# Patient Record
Sex: MALE | Race: WHITE | NOT HISPANIC OR LATINO | URBAN - METROPOLITAN AREA
[De-identification: names, ages, dates, MRNs, and addresses within clinical notes are randomized per-mention and may not be internally consistent; named-entity substitution may affect disease eponyms.]

---

## 2017-01-29 ENCOUNTER — INPATIENT (INPATIENT)
Facility: HOSPITAL | Age: 62
LOS: 0 days | Discharge: HOME | DRG: 313 | End: 2017-01-30
Attending: INTERNAL MEDICINE | Admitting: INTERNAL MEDICINE
Payer: MEDICAID

## 2017-01-29 VITALS
OXYGEN SATURATION: 97 % | RESPIRATION RATE: 18 BRPM | HEIGHT: 65 IN | TEMPERATURE: 98 F | WEIGHT: 220.02 LBS | SYSTOLIC BLOOD PRESSURE: 167 MMHG | HEART RATE: 63 BPM | DIASTOLIC BLOOD PRESSURE: 100 MMHG

## 2017-01-29 DIAGNOSIS — R07.9 CHEST PAIN, UNSPECIFIED: ICD-10-CM

## 2017-01-29 DIAGNOSIS — K21.9 GASTRO-ESOPHAGEAL REFLUX DISEASE WITHOUT ESOPHAGITIS: ICD-10-CM

## 2017-01-29 DIAGNOSIS — J44.9 CHRONIC OBSTRUCTIVE PULMONARY DISEASE, UNSPECIFIED: ICD-10-CM

## 2017-01-29 DIAGNOSIS — I10 ESSENTIAL (PRIMARY) HYPERTENSION: ICD-10-CM

## 2017-01-29 DIAGNOSIS — E78.5 HYPERLIPIDEMIA, UNSPECIFIED: ICD-10-CM

## 2017-01-29 DIAGNOSIS — Z98.89 OTHER SPECIFIED POSTPROCEDURAL STATES: Chronic | ICD-10-CM

## 2017-01-29 LAB
ALBUMIN SERPL ELPH-MCNC: 3.8 G/DL — SIGNIFICANT CHANGE UP (ref 3.5–5)
ALP SERPL-CCNC: 80 U/L — SIGNIFICANT CHANGE UP (ref 40–120)
ALT FLD-CCNC: 26 U/L DA — SIGNIFICANT CHANGE UP (ref 10–60)
ANION GAP SERPL CALC-SCNC: 7 MMOL/L — SIGNIFICANT CHANGE UP (ref 5–17)
AST SERPL-CCNC: 20 U/L — SIGNIFICANT CHANGE UP (ref 10–40)
BASOPHILS # BLD AUTO: 0.1 K/UL — SIGNIFICANT CHANGE UP (ref 0–0.2)
BASOPHILS NFR BLD AUTO: 1.1 % — SIGNIFICANT CHANGE UP (ref 0–2)
BILIRUB SERPL-MCNC: 0.5 MG/DL — SIGNIFICANT CHANGE UP (ref 0.2–1.2)
BUN SERPL-MCNC: 15 MG/DL — SIGNIFICANT CHANGE UP (ref 7–18)
CALCIUM SERPL-MCNC: 9.9 MG/DL — SIGNIFICANT CHANGE UP (ref 8.4–10.5)
CHLORIDE SERPL-SCNC: 108 MMOL/L — SIGNIFICANT CHANGE UP (ref 96–108)
CK MB BLD-MCNC: 2 % — SIGNIFICANT CHANGE UP (ref 0–3.5)
CK MB CFR SERPL CALC: 1 NG/ML — SIGNIFICANT CHANGE UP (ref 0–3.6)
CK SERPL-CCNC: 51 U/L — SIGNIFICANT CHANGE UP (ref 35–232)
CO2 SERPL-SCNC: 31 MMOL/L — SIGNIFICANT CHANGE UP (ref 22–31)
CREAT SERPL-MCNC: 1.15 MG/DL — SIGNIFICANT CHANGE UP (ref 0.5–1.3)
EOSINOPHIL # BLD AUTO: 0.3 K/UL — SIGNIFICANT CHANGE UP (ref 0–0.5)
EOSINOPHIL NFR BLD AUTO: 3.8 % — SIGNIFICANT CHANGE UP (ref 0–6)
GLUCOSE SERPL-MCNC: 87 MG/DL — SIGNIFICANT CHANGE UP (ref 70–99)
HCT VFR BLD CALC: 51.5 % — HIGH (ref 39–50)
HGB BLD-MCNC: 17.3 G/DL — HIGH (ref 13–17)
LIDOCAIN IGE QN: 122 U/L — SIGNIFICANT CHANGE UP (ref 73–393)
LYMPHOCYTES # BLD AUTO: 1.8 K/UL — SIGNIFICANT CHANGE UP (ref 1–3.3)
LYMPHOCYTES # BLD AUTO: 20.1 % — SIGNIFICANT CHANGE UP (ref 13–44)
MCHC RBC-ENTMCNC: 31 PG — SIGNIFICANT CHANGE UP (ref 27–34)
MCHC RBC-ENTMCNC: 33.6 GM/DL — SIGNIFICANT CHANGE UP (ref 32–36)
MCV RBC AUTO: 92.4 FL — SIGNIFICANT CHANGE UP (ref 80–100)
MONOCYTES # BLD AUTO: 0.7 K/UL — SIGNIFICANT CHANGE UP (ref 0–0.9)
MONOCYTES NFR BLD AUTO: 7.6 % — SIGNIFICANT CHANGE UP (ref 2–14)
NEUTROPHILS # BLD AUTO: 6.2 K/UL — SIGNIFICANT CHANGE UP (ref 1.8–7.4)
NEUTROPHILS NFR BLD AUTO: 67.4 % — SIGNIFICANT CHANGE UP (ref 43–77)
PLATELET # BLD AUTO: 169 K/UL — SIGNIFICANT CHANGE UP (ref 150–400)
POTASSIUM SERPL-MCNC: 4 MMOL/L — SIGNIFICANT CHANGE UP (ref 3.5–5.3)
POTASSIUM SERPL-SCNC: 4 MMOL/L — SIGNIFICANT CHANGE UP (ref 3.5–5.3)
PROT SERPL-MCNC: 7 G/DL — SIGNIFICANT CHANGE UP (ref 6–8.3)
RBC # BLD: 5.57 M/UL — SIGNIFICANT CHANGE UP (ref 4.2–5.8)
RBC # FLD: 12.8 % — SIGNIFICANT CHANGE UP (ref 10.3–14.5)
SODIUM SERPL-SCNC: 146 MMOL/L — HIGH (ref 135–145)
TROPONIN I SERPL-MCNC: <0.015 NG/ML — SIGNIFICANT CHANGE UP (ref 0–0.04)
TROPONIN I SERPL-MCNC: <0.015 NG/ML — SIGNIFICANT CHANGE UP (ref 0–0.04)
WBC # BLD: 9.1 K/UL — SIGNIFICANT CHANGE UP (ref 3.8–10.5)
WBC # FLD AUTO: 9.1 K/UL — SIGNIFICANT CHANGE UP (ref 3.8–10.5)

## 2017-01-29 PROCEDURE — 71010: CPT | Mod: 26

## 2017-01-29 PROCEDURE — 99285 EMERGENCY DEPT VISIT HI MDM: CPT | Mod: 25

## 2017-01-29 RX ORDER — PANTOPRAZOLE SODIUM 20 MG/1
40 TABLET, DELAYED RELEASE ORAL
Qty: 0 | Refills: 0 | Status: DISCONTINUED | OUTPATIENT
Start: 2017-01-29 | End: 2017-01-30

## 2017-01-29 RX ORDER — ATORVASTATIN CALCIUM 80 MG/1
1 TABLET, FILM COATED ORAL
Qty: 0 | Refills: 0 | COMMUNITY

## 2017-01-29 RX ORDER — FLUTICASONE PROPIONATE AND SALMETEROL 50; 250 UG/1; UG/1
0 POWDER ORAL; RESPIRATORY (INHALATION)
Qty: 0 | Refills: 0 | COMMUNITY

## 2017-01-29 RX ORDER — ACETAMINOPHEN 500 MG
650 TABLET ORAL EVERY 6 HOURS
Qty: 0 | Refills: 0 | Status: DISCONTINUED | OUTPATIENT
Start: 2017-01-29 | End: 2017-01-30

## 2017-01-29 RX ORDER — NITROGLYCERIN 6.5 MG
0.3 CAPSULE, EXTENDED RELEASE ORAL
Qty: 0 | Refills: 0 | Status: DISCONTINUED | OUTPATIENT
Start: 2017-01-29 | End: 2017-01-30

## 2017-01-29 RX ORDER — ATORVASTATIN CALCIUM 80 MG/1
40 TABLET, FILM COATED ORAL AT BEDTIME
Qty: 0 | Refills: 0 | Status: DISCONTINUED | OUTPATIENT
Start: 2017-01-29 | End: 2017-01-30

## 2017-01-29 RX ORDER — IBUPROFEN 200 MG
400 TABLET ORAL ONCE
Qty: 0 | Refills: 0 | Status: DISCONTINUED | OUTPATIENT
Start: 2017-01-29 | End: 2017-01-29

## 2017-01-29 RX ORDER — CARVEDILOL PHOSPHATE 80 MG/1
0 CAPSULE, EXTENDED RELEASE ORAL
Qty: 0 | Refills: 0 | COMMUNITY

## 2017-01-29 RX ORDER — SERTRALINE 25 MG/1
100 TABLET, FILM COATED ORAL DAILY
Qty: 0 | Refills: 0 | Status: DISCONTINUED | OUTPATIENT
Start: 2017-01-29 | End: 2017-01-30

## 2017-01-29 RX ORDER — CLOPIDOGREL BISULFATE 75 MG/1
75 TABLET, FILM COATED ORAL DAILY
Qty: 0 | Refills: 0 | Status: DISCONTINUED | OUTPATIENT
Start: 2017-01-29 | End: 2017-01-30

## 2017-01-29 RX ORDER — BUDESONIDE AND FORMOTEROL FUMARATE DIHYDRATE 160; 4.5 UG/1; UG/1
2 AEROSOL RESPIRATORY (INHALATION)
Qty: 0 | Refills: 0 | Status: DISCONTINUED | OUTPATIENT
Start: 2017-01-29 | End: 2017-01-30

## 2017-01-29 RX ORDER — LOSARTAN POTASSIUM 100 MG/1
100 TABLET, FILM COATED ORAL DAILY
Qty: 0 | Refills: 0 | Status: DISCONTINUED | OUTPATIENT
Start: 2017-01-29 | End: 2017-01-30

## 2017-01-29 RX ORDER — FUROSEMIDE 40 MG
1 TABLET ORAL
Qty: 0 | Refills: 0 | COMMUNITY

## 2017-01-29 RX ORDER — CARVEDILOL PHOSPHATE 80 MG/1
6.25 CAPSULE, EXTENDED RELEASE ORAL EVERY 12 HOURS
Qty: 0 | Refills: 0 | Status: DISCONTINUED | OUTPATIENT
Start: 2017-01-29 | End: 2017-01-30

## 2017-01-29 RX ORDER — VALACYCLOVIR 500 MG/1
1 TABLET, FILM COATED ORAL
Qty: 0 | Refills: 0 | COMMUNITY

## 2017-01-29 RX ORDER — ENOXAPARIN SODIUM 100 MG/ML
40 INJECTION SUBCUTANEOUS DAILY
Qty: 0 | Refills: 0 | Status: DISCONTINUED | OUTPATIENT
Start: 2017-01-29 | End: 2017-01-30

## 2017-01-29 RX ORDER — MORPHINE SULFATE 50 MG/1
2 CAPSULE, EXTENDED RELEASE ORAL ONCE
Qty: 0 | Refills: 0 | Status: DISCONTINUED | OUTPATIENT
Start: 2017-01-29 | End: 2017-01-29

## 2017-01-29 RX ORDER — ROPINIROLE 8 MG/1
1 TABLET, FILM COATED, EXTENDED RELEASE ORAL THREE TIMES A DAY
Qty: 0 | Refills: 0 | Status: DISCONTINUED | OUTPATIENT
Start: 2017-01-29 | End: 2017-01-30

## 2017-01-29 RX ADMIN — ROPINIROLE 1 MILLIGRAM(S): 8 TABLET, FILM COATED, EXTENDED RELEASE ORAL at 13:18

## 2017-01-29 RX ADMIN — CLOPIDOGREL BISULFATE 75 MILLIGRAM(S): 75 TABLET, FILM COATED ORAL at 13:18

## 2017-01-29 RX ADMIN — ATORVASTATIN CALCIUM 40 MILLIGRAM(S): 80 TABLET, FILM COATED ORAL at 22:35

## 2017-01-29 RX ADMIN — LOSARTAN POTASSIUM 100 MILLIGRAM(S): 100 TABLET, FILM COATED ORAL at 13:18

## 2017-01-29 RX ADMIN — ROPINIROLE 1 MILLIGRAM(S): 8 TABLET, FILM COATED, EXTENDED RELEASE ORAL at 22:35

## 2017-01-29 RX ADMIN — BUDESONIDE AND FORMOTEROL FUMARATE DIHYDRATE 2 PUFF(S): 160; 4.5 AEROSOL RESPIRATORY (INHALATION) at 22:37

## 2017-01-29 RX ADMIN — Medication 650 MILLIGRAM(S): at 21:14

## 2017-01-29 RX ADMIN — Medication 650 MILLIGRAM(S): at 22:34

## 2017-01-29 RX ADMIN — CARVEDILOL PHOSPHATE 6.25 MILLIGRAM(S): 80 CAPSULE, EXTENDED RELEASE ORAL at 18:10

## 2017-01-29 NOTE — H&P ADULT. - NEGATIVE CARDIOVASCULAR SYMPTOMS
no peripheral edema/no dyspnea on exertion/no paroxysmal nocturnal dyspnea/no orthopnea/no claudication

## 2017-01-29 NOTE — H&P ADULT. - NEGATIVE GASTROINTESTINAL SYMPTOMS
no constipation/no change in bowel habits/no jaundice/no abdominal pain/no vomiting/no hematochezia/no diarrhea/no steatorrhea/no nausea/no hiccoughs

## 2017-01-29 NOTE — H&P ADULT. - RS GEN PE MLT RESP DETAILS PC
no rhonchi/good air movement/breath sounds equal/no wheezes/respirations non-labored/clear to auscultation bilaterally/airway patent/no chest wall tenderness/no rales/normal/no subcutaneous emphysema/no intercostal retractions

## 2017-01-29 NOTE — H&P ADULT. - NEGATIVE GENERAL SYMPTOMS
no polyuria/no fever/no polydipsia/no sweating/no malaise/no polyphagia/no weight gain/no fatigue/no chills/no anorexia/no weight loss

## 2017-01-29 NOTE — H&P ADULT. - PSH
H/O prostatectomy  2011  Penile abnormality  insertion of penile prothesis 11/2013  S/P colonoscopy  and endoscopy 6/2013  S/P prostatectomy  2/2011  S/P UPPP (Uvulopalatopharyngoplasty)  2008  S/P uvulopalatopharyngoplasty  2009

## 2017-01-29 NOTE — H&P ADULT. - FAMILY HISTORY
Father  Still living? Unknown  Family history of hypertension, Age at diagnosis: Age Unknown     Sibling  Still living? Unknown  Family history of hypertension, Age at diagnosis: Age Unknown  History of hypertension, Age at diagnosis: Age Unknown

## 2017-01-29 NOTE — ED PROVIDER NOTE - PMH
Asthma  controlled- no RX @ present  BPH (Benign Prostatic Hyperplasia)    CAD (Coronary Atherosclerotic Disease)  myocardial infarction- 1988, s/p PCI-RCA stent- 8/6/07  Cardiac abnormality  cardiac stent X1 2007  COPD (chronic obstructive pulmonary disease)    Dyslipidemia    Gastroesophageal reflux disease    GERD (Gastroesophageal Reflux Disease)    GI bleed  6/2013 pt stopped ASA  History of prostate cancer    HTN (Hypertension)    Hyperlipidemia    Hypertension    IBS (irritable bowel syndrome)    Impotence sexual    Insomnia    Male urinary stress incontinence    MI (myocardial infarction)  1988 s/p insert stent  Obstructive Sleep Apnea  s/p UPP-2008 sleep studies -2008 refuses to use cpap.  Prostate cancer  denies chemotherapy and radiation  Prostate cancer    Sleep apnea    Stress incontinence, male    Urinary incontinence

## 2017-01-29 NOTE — H&P ADULT. - ASSESSMENT
60 y/o M with Hx asthma/ COPD ( never intubated , not home O2 ), BPH , CAD , HLD , GERD , HTN , tremor ( on ropinirole ) , prostate cancer ( s/p resection ) , Hx GI bleed ,  s/p NEO surgery  who presents to ED for chest pain started 1-30 am last night. As per patient he was in his usual state of health until yesterday night 1-30 am when he woke up with left sided chest pain radiating from left head , associated with nausea , dizziness , SOB , burning in quality , 7/10 , was constant for 2-3 hours and then resolved . Nothing was making it better or worse. Hx MI in 1988 and s/p stent in 2007 , on Plavix since then. Currently in ED pt is not in pain , vitals stable , EKG - NSR , trop1 - negative

## 2017-01-29 NOTE — ED PROVIDER NOTE - MEDICAL DECISION MAKING DETAILS
Pt is a 61M with chest pain. labs, EKG, CXR done. pt given ASA 325mg in ED. pt admitted for chest pain r/o ACS. PMD: Dr. LU Avelar

## 2017-01-29 NOTE — H&P ADULT. - NEGATIVE NEUROLOGICAL SYMPTOMS
no loss of sensation/no facial palsy/no syncope/no vertigo/no paresthesias/no loss of consciousness/no transient paralysis/no tremors/no headache/no difficulty walking/no generalized seizures/no weakness/no hemiparesis

## 2017-01-29 NOTE — H&P ADULT. - PROBLEM SELECTOR PLAN 3
Goal BP < 150/90 mmhg   c/w home BP meds losartan   holding chlorthalidone as out pharmacy doesn't have   pt to bring own home meds   c/w DASH diet

## 2017-01-29 NOTE — H&P ADULT. - NEUROLOGICAL DETAILS
no spontaneous movement/responds to verbal commands/alert and oriented x 3/deep reflexes intact/responds to pain/cranial nerves intact/sensation intact

## 2017-01-29 NOTE — H&P ADULT. - NEGATIVE OPHTHALMOLOGIC SYMPTOMS
no pain L/no blurred vision L/no loss of vision R/no loss of vision L/no lacrimation L/no discharge R/no lacrimation R/no irritation L/no irritation R/no scleral injection R/no pain R/no scleral injection L/no photophobia/no diplopia

## 2017-01-29 NOTE — H&P ADULT. - PROBLEM SELECTOR PLAN 1
concern for ACS   Hx MI , CAD with stent , HTN , HLD   unremarkable cardio and neuro exam   c/w tele monitoring   trop - 1 negative , trend cardiac enzymes   c/w ACS protocol - Plavix , BB, statin   f/u HbA1c , TSH , Lipid profile , f/u echo concern for ACS   Hx MI , CAD with stent , HTN , HLD   unremarkable cardio and neuro exam   c/w tele monitoring   trop - 1 negative , trend cardiac enzymes   c/w ACS protocol - Plavix , BB, statin   f/u HbA1c , TSH , Lipid profile , f/u echo  Dr. Underwood cardio

## 2017-01-29 NOTE — H&P ADULT. - NEGATIVE GENERAL GENITOURINARY SYMPTOMS
no renal colic/no urinary hesitancy/no incontinence/normal urinary frequency/no flank pain R/no urine discoloration/no bladder infections/no gas in urine/no flank pain L/no hematuria/no dysuria/no nocturia/normal libido

## 2017-01-29 NOTE — H&P ADULT. - NEGATIVE SKIN SYMPTOMS
no itching/no pitted nails/no brittle nails/no change in size/color of mole/no tumor/no hair loss/no rash/no dryness

## 2017-01-29 NOTE — H&P ADULT. - NEGATIVE ENMT SYMPTOMS
no post-nasal discharge/no sinus symptoms/no abnormal taste sensation/no tinnitus/no dry mouth/no nasal obstruction/no hearing difficulty/no nasal congestion/no ear pain/no dysphagia/no nasal discharge/no vertigo/no nose bleeds/no throat pain/no gum bleeding/no recurrent cold sores

## 2017-01-29 NOTE — H&P ADULT. - HISTORY OF PRESENT ILLNESS
62 y/o M with Hx asthma, BPH , CAD , HLD , GERD , HTN , tremor ( on ropinirole ) , prostate cancer ( s/p resection ) , Hx GI bleed who presents to ED for chest pain started 1-30 am last night. As per patient he was in his usual state of health until yesterday night 1-30 am when he woke up with left sided chest pain radiating from left head , associated with nausea , dizziness , SOB , burning in quality , 7/10 , was constant for 2-3 hours and then resolved . Nothing was making it better or worse. Hx MI in 1988 and s/p stent in 2007 , on Plavix since then. Currently in ED pt is not in pain , vitals stable , EKG - NSR , trop1 - negative

## 2017-01-29 NOTE — ED PROVIDER NOTE - OBJECTIVE STATEMENT
Pt is a 61M who presents to ED for chest pain described as burning dull pain beginning last night around 8 pm. Pt states the pain is intermittent. Pt presents due to pain being persistent. Pt has minimal chest pain in ED.

## 2017-01-29 NOTE — H&P ADULT. - GASTROINTESTINAL DETAILS
no bruit/soft/no distention/bowel sounds normal/no guarding/no masses palpable/no rebound tenderness/no organomegaly/no rigidity/nontender/normal

## 2017-01-29 NOTE — H&P ADULT. - NEGATIVE MUSCULOSKELETAL SYMPTOMS
no arm pain R/no back pain/no joint swelling/no arthritis/no muscle weakness/no leg pain R/no arthralgia/no stiffness/no muscle cramps/no myalgia/no leg pain L

## 2017-01-30 ENCOUNTER — TRANSCRIPTION ENCOUNTER (OUTPATIENT)
Age: 62
End: 2017-01-30

## 2017-01-30 VITALS
OXYGEN SATURATION: 94 % | DIASTOLIC BLOOD PRESSURE: 95 MMHG | RESPIRATION RATE: 18 BRPM | HEART RATE: 63 BPM | SYSTOLIC BLOOD PRESSURE: 141 MMHG | TEMPERATURE: 97 F

## 2017-01-30 LAB
CK MB BLD-MCNC: <1.3 % — SIGNIFICANT CHANGE UP (ref 0–3.5)
CK MB CFR SERPL CALC: <1 NG/ML — SIGNIFICANT CHANGE UP (ref 0–3.6)
CK SERPL-CCNC: 76 U/L — SIGNIFICANT CHANGE UP (ref 35–232)
TROPONIN I SERPL-MCNC: <0.015 NG/ML — SIGNIFICANT CHANGE UP (ref 0–0.04)

## 2017-01-30 RX ADMIN — CLOPIDOGREL BISULFATE 75 MILLIGRAM(S): 75 TABLET, FILM COATED ORAL at 11:08

## 2017-01-30 RX ADMIN — BUDESONIDE AND FORMOTEROL FUMARATE DIHYDRATE 2 PUFF(S): 160; 4.5 AEROSOL RESPIRATORY (INHALATION) at 11:08

## 2017-01-30 RX ADMIN — ROPINIROLE 1 MILLIGRAM(S): 8 TABLET, FILM COATED, EXTENDED RELEASE ORAL at 05:33

## 2017-01-30 RX ADMIN — PANTOPRAZOLE SODIUM 40 MILLIGRAM(S): 20 TABLET, DELAYED RELEASE ORAL at 05:33

## 2017-01-30 RX ADMIN — LOSARTAN POTASSIUM 100 MILLIGRAM(S): 100 TABLET, FILM COATED ORAL at 05:32

## 2017-01-30 RX ADMIN — CARVEDILOL PHOSPHATE 6.25 MILLIGRAM(S): 80 CAPSULE, EXTENDED RELEASE ORAL at 05:32

## 2017-01-30 RX ADMIN — ROPINIROLE 1 MILLIGRAM(S): 8 TABLET, FILM COATED, EXTENDED RELEASE ORAL at 13:57

## 2017-01-30 NOTE — DISCHARGE NOTE ADULT - HOSPITAL COURSE
62 y/o M with Hx asthma, BPH , CAD , HLD , GERD , HTN , tremor ( on ropinirole ) , prostate cancer ( s/p resection ) , Hx GI bleed who presented  to ED for chest pain .Hx MI in 1988 and s/p stent in 2007 , on plavix since then. , EKG - NSR , trop1  x 3 negative. patient admitted to telemetry to rule out  ACS  and serial cardiac enzymes negative and cardiology folllowed the patient and recommended stress test [facility] is negative for iscahemia  HTN- C/W LOSARTAN AND CLORTHIADONE   f/u PMD AS OUT-PATIENT   GERD- c/w protonix   hyperlipidemia- c/w with atorvostatin   please follow  up with PMD within 1 week of discharge and patient now stable for discharge

## 2017-01-30 NOTE — DISCHARGE NOTE ADULT - PLAN OF CARE
resolution of symptoms please take your medications as prescribed and please follow up with PMD within 1 week of discharge goal B.P<140/90

## 2017-01-30 NOTE — DISCHARGE NOTE ADULT - MEDICATION SUMMARY - MEDICATIONS TO TAKE
I will START or STAY ON the medications listed below when I get home from the hospital:    naproxen 500 mg oral tablet  --  by mouth   -- Indication: For pain    losartan 100 mg oral tablet  -- 1 tab(s) by mouth once a day  -- Indication: For Hypertension    sertraline 100 mg oral tablet  -- 1 tab(s) by mouth once a day  -- Indication: For depression    Lipitor 40 mg oral tablet  -- 1 tab(s) by mouth once a day (at bedtime)  -- Indication: For Hyperlipidemia    rOPINIRole 1 mg oral tablet  -- 1 tab(s) by mouth 3 times a day  -- Indication: For parkinsons    clopidogrel 75 mg oral tablet  -- 1 tab(s) by mouth once a day  -- Indication: For Cad    zolpidem 10 mg oral tablet  -- 1 tab(s) by mouth once a day (at bedtime)  -- Indication: For ANXIOLYTIC    Coreg 6.25 mg oral tablet  -- 1 tab(s) by mouth 2 times a day  -- Indication: For Hypertension    Advair Diskus 250 mcg-50 mcg inhalation powder  -- 1 puff(s) inhaled 2 times a day  -- Indication: For COPD (chronic obstructive pulmonary disease)    triamcinolone 0.1% topical cream  -- Apply on skin to affected area   -- Indication: For TOIPCAL-AGENT    chlorthalidone 25 mg oral tablet  -- 1 tab(s) by mouth once a day  -- Indication: For Hypertension    Amitiza 24 mcg oral capsule  --  by mouth   -- Indication: For FUCTIONAL BOWEL DISORDER    NexIUM 40 mg oral delayed release capsule  -- 1 cap(s) by mouth once a day  -- Indication: For Gi prophylaxsis I will START or STAY ON the medications listed below when I get home from the hospital:    naproxen 500 mg oral tablet  --  by mouth   -- Indication: For Chest pain    losartan 100 mg oral tablet  -- 1 tab(s) by mouth once a day  -- Indication: For HTN (hypertension)    sertraline 100 mg oral tablet  -- 1 tab(s) by mouth once a day  -- Indication: For depression    Lipitor 40 mg oral tablet  -- 1 tab(s) by mouth once a day (at bedtime)  -- Indication: For Hyperlipidemia    rOPINIRole 1 mg oral tablet  -- 1 tab(s) by mouth 3 times a day  -- Indication: For tremor    clopidogrel 75 mg oral tablet  -- 1 tab(s) by mouth once a day  -- Indication: For Chest pain with low risk of acute coronary syndrome    zolpidem 10 mg oral tablet  -- 1 tab(s) by mouth once a day (at bedtime)  -- Indication: For Anxiolytic    Coreg 6.25 mg oral tablet  -- 1 tab(s) by mouth 2 times a day  -- Indication: For HTN (hypertension)    Advair Diskus 250 mcg-50 mcg inhalation powder  -- 1 puff(s) inhaled 2 times a day  -- Indication: For Asthma    triamcinolone 0.1% topical cream  -- Apply on skin to affected area   -- Indication: For topical agent    chlorthalidone 25 mg oral tablet  -- 1 tab(s) by mouth once a day  -- Indication: For Hypertension    Amitiza 24 mcg oral capsule  --  by mouth   -- Indication: For Gastrointestinal agent    NexIUM 40 mg oral delayed release capsule  -- 1 cap(s) by mouth once a day  -- Indication: For Gi prophylaxsis

## 2017-01-30 NOTE — DISCHARGE NOTE ADULT - CARE PLAN
Principal Discharge DX:	Chest pain with low risk of acute coronary syndrome  Goal:	resolution of symptoms  Instructions for follow-up, activity and diet:	please take your medications as prescribed and please follow up with PMD within 1 week of discharge  Secondary Diagnosis:	Asthma  Goal:	resolution of symptoms  Instructions for follow-up, activity and diet:	please take your medications as prescribed and please follow up with PMD within 1 week of discharge  Secondary Diagnosis:	Gastroesophageal reflux disease  Goal:	resolution of symptoms  Instructions for follow-up, activity and diet:	please take your medications as prescribed and please follow up with PMD within 1 week of discharge  Secondary Diagnosis:	HTN (hypertension)  Goal:	goal B.P<140/90  Instructions for follow-up, activity and diet:	please take your medications as prescribed and please follow up with PMD within 1 week of discharge  Secondary Diagnosis:	Hyperlipidemia

## 2017-01-30 NOTE — DISCHARGE NOTE ADULT - PATIENT PORTAL LINK FT
“You can access the [facility] Patient Portal, offered by [facility], by registering with the following website: [email or web address]”

## 2017-02-02 DIAGNOSIS — I25.2 OLD MYOCARDIAL INFARCTION: ICD-10-CM

## 2017-02-02 DIAGNOSIS — R07.9 CHEST PAIN, UNSPECIFIED: ICD-10-CM

## 2017-02-02 DIAGNOSIS — R32 UNSPECIFIED URINARY INCONTINENCE: ICD-10-CM

## 2017-02-02 DIAGNOSIS — I25.10 ATHEROSCLEROTIC HEART DISEASE OF NATIVE CORONARY ARTERY WITHOUT ANGINA PECTORIS: ICD-10-CM

## 2017-02-02 DIAGNOSIS — N40.0 BENIGN PROSTATIC HYPERPLASIA WITHOUT LOWER URINARY TRACT SYMPTOMS: ICD-10-CM

## 2017-02-02 DIAGNOSIS — E78.5 HYPERLIPIDEMIA, UNSPECIFIED: ICD-10-CM

## 2017-02-02 DIAGNOSIS — G47.33 OBSTRUCTIVE SLEEP APNEA (ADULT) (PEDIATRIC): ICD-10-CM

## 2017-02-02 DIAGNOSIS — Z85.46 PERSONAL HISTORY OF MALIGNANT NEOPLASM OF PROSTATE: ICD-10-CM

## 2017-02-02 DIAGNOSIS — I10 ESSENTIAL (PRIMARY) HYPERTENSION: ICD-10-CM

## 2017-02-02 DIAGNOSIS — N39.3 STRESS INCONTINENCE (FEMALE) (MALE): ICD-10-CM

## 2017-02-02 DIAGNOSIS — K21.9 GASTRO-ESOPHAGEAL REFLUX DISEASE WITHOUT ESOPHAGITIS: ICD-10-CM

## 2017-02-02 DIAGNOSIS — R25.1 TREMOR, UNSPECIFIED: ICD-10-CM

## 2017-02-02 DIAGNOSIS — J44.9 CHRONIC OBSTRUCTIVE PULMONARY DISEASE, UNSPECIFIED: ICD-10-CM

## 2017-12-18 PROCEDURE — 78452 HT MUSCLE IMAGE SPECT MULT: CPT

## 2017-12-18 PROCEDURE — 82553 CREATINE MB FRACTION: CPT

## 2017-12-18 PROCEDURE — 71045 X-RAY EXAM CHEST 1 VIEW: CPT

## 2017-12-18 PROCEDURE — 80053 COMPREHEN METABOLIC PANEL: CPT

## 2017-12-18 PROCEDURE — 84484 ASSAY OF TROPONIN QUANT: CPT

## 2017-12-18 PROCEDURE — 93017 CV STRESS TEST TRACING ONLY: CPT

## 2017-12-18 PROCEDURE — 99285 EMERGENCY DEPT VISIT HI MDM: CPT | Mod: 25

## 2017-12-18 PROCEDURE — A9502: CPT

## 2017-12-18 PROCEDURE — 93306 TTE W/DOPPLER COMPLETE: CPT

## 2017-12-18 PROCEDURE — 93005 ELECTROCARDIOGRAM TRACING: CPT

## 2017-12-18 PROCEDURE — 83690 ASSAY OF LIPASE: CPT

## 2017-12-18 PROCEDURE — 85027 COMPLETE CBC AUTOMATED: CPT

## 2017-12-18 PROCEDURE — 94640 AIRWAY INHALATION TREATMENT: CPT

## 2017-12-18 PROCEDURE — 82550 ASSAY OF CK (CPK): CPT

## 2018-06-15 ENCOUNTER — OUTPATIENT (OUTPATIENT)
Dept: OUTPATIENT SERVICES | Facility: HOSPITAL | Age: 63
LOS: 1 days | End: 2018-06-15
Payer: MEDICARE

## 2018-06-15 VITALS
WEIGHT: 227.96 LBS | HEIGHT: 65 IN | HEART RATE: 82 BPM | TEMPERATURE: 98 F | RESPIRATION RATE: 18 BRPM | DIASTOLIC BLOOD PRESSURE: 95 MMHG | SYSTOLIC BLOOD PRESSURE: 151 MMHG | OXYGEN SATURATION: 97 %

## 2018-06-15 DIAGNOSIS — G47.33 OBSTRUCTIVE SLEEP APNEA (ADULT) (PEDIATRIC): ICD-10-CM

## 2018-06-15 DIAGNOSIS — Z98.89 OTHER SPECIFIED POSTPROCEDURAL STATES: Chronic | ICD-10-CM

## 2018-06-15 DIAGNOSIS — T83.490A OTHER MECHANICAL COMPLICATION OF IMPLANTED PENILE PROSTHESIS, INITIAL ENCOUNTER: ICD-10-CM

## 2018-06-15 DIAGNOSIS — I10 ESSENTIAL (PRIMARY) HYPERTENSION: ICD-10-CM

## 2018-06-15 DIAGNOSIS — Z01.818 ENCOUNTER FOR OTHER PREPROCEDURAL EXAMINATION: ICD-10-CM

## 2018-06-15 DIAGNOSIS — Z95.5 PRESENCE OF CORONARY ANGIOPLASTY IMPLANT AND GRAFT: ICD-10-CM

## 2018-06-15 PROCEDURE — G0463: CPT

## 2018-06-15 PROCEDURE — 71046 X-RAY EXAM CHEST 2 VIEWS: CPT

## 2018-06-15 PROCEDURE — 71046 X-RAY EXAM CHEST 2 VIEWS: CPT | Mod: 26

## 2018-06-15 RX ORDER — SERTRALINE 25 MG/1
1 TABLET, FILM COATED ORAL
Qty: 0 | Refills: 0 | COMMUNITY

## 2018-06-15 RX ORDER — SODIUM CHLORIDE 9 MG/ML
3 INJECTION INTRAMUSCULAR; INTRAVENOUS; SUBCUTANEOUS EVERY 8 HOURS
Qty: 0 | Refills: 0 | Status: DISCONTINUED | OUTPATIENT
Start: 2018-06-25 | End: 2018-07-03

## 2018-06-15 RX ORDER — ZOLPIDEM TARTRATE 10 MG/1
1 TABLET ORAL
Qty: 0 | Refills: 0 | COMMUNITY

## 2018-06-15 RX ORDER — LUBIPROSTONE 24 UG/1
0 CAPSULE, GELATIN COATED ORAL
Qty: 0 | Refills: 0 | COMMUNITY

## 2018-06-15 NOTE — H&P PST ADULT - ACTIVITY
Sedentary, walks 1-2 blocks, avoids stairs Sedentary, but able to walks 1-2 blocks w/o SOB, avoids stairs

## 2018-06-15 NOTE — H&P PST ADULT - CARDIOVASCULAR COMMENTS
h/o old MI x2, CAD s/p PTCA x 1, HTN, HLD, Obesity h/o old MI x2, s/p PCI-RCA stent- 8/6/07 x 1, HTN, HLD, Obesity

## 2018-06-15 NOTE — H&P PST ADULT - NEGATIVE CARDIOVASCULAR SYMPTOMS
no dyspnea on exertion/no peripheral edema/no palpitations/no orthopnea/no paroxysmal nocturnal dyspnea/no claudication/no chest pain no chest pain/no peripheral edema/no palpitations/no orthopnea/no paroxysmal nocturnal dyspnea/no claudication

## 2018-06-15 NOTE — H&P PST ADULT - NEGATIVE PSYCHIATRIC SYMPTOMS
no suicidal ideation/no memory loss/no anxiety/no depression/no insomnia no anxiety/no suicidal ideation/no depression/no insomnia

## 2018-06-15 NOTE — H&P PST ADULT - PROBLEM SELECTOR PLAN 4
Instructed to stop plavix and start aspirin 81mg; take aspirin the morning of surgery prior to coming to hospital. Hx coronary stent x 1. Verbalized understanding of instruction

## 2018-06-15 NOTE — H&P PST ADULT - NEGATIVE GASTROINTESTINAL SYMPTOMS
no diarrhea/no change in bowel habits/no vomiting/no constipation/no nausea/no flatulence/no abdominal pain

## 2018-06-15 NOTE — H&P PST ADULT - PROBLEM SELECTOR PLAN 3
Agrees to take antihypertensive medications as prescribed the morning of surgery with a sip of water prior to coming to hospital

## 2018-06-15 NOTE — H&P PST ADULT - NS MD HP INPLANTS MED DEV
Penile implant/coronary stent x 1, incontinence filter Penile implant/coronary stent x 1, incontinence device

## 2018-06-15 NOTE — H&P PST ADULT - GASTROINTESTINAL DETAILS
no rigidity/no distention/no bruit/no rebound tenderness/no guarding/no organomegaly/soft/nontender/no masses palpable/bowel sounds normal

## 2018-06-15 NOTE — H&P PST ADULT - PROBLEM SELECTOR PLAN 1
Scheduled for removal and replacement of all components of multicomponent inflatable penile prosthesis on 6/25/2018. Preoperative instructions discussed and given to patient. Verbalized understanding of same

## 2018-06-15 NOTE — H&P PST ADULT - GENITOURINARY COMMENTS
h/o urinary filter implant for incontinence, h/o prostate cancer s/p prostatectomy penile implant in place, malfunctioning h/o urinary device implanted for incontinence, h/o prostate cancer s/p prostatectomy penile implant in place, malfunctioning x 6 months

## 2018-06-15 NOTE — H&P PST ADULT - ASSESSMENT
63 y/o male with PMH of NEO on CPAP, ASHD s/p PCI 2007, HTN, HLD, old MI x 2, COPD, GERD, Prostate Cancer s/p prostatectomy, Implant of device for urinary incontinence, ED s/p penile prosthesis now scheduled for removal and replacement of all components of multicomponent inflatable penile prosthesis on 6/25/2018. Patient is at high risk for planned procedure

## 2018-06-15 NOTE — H&P PST ADULT - RS GEN PE MLT RESP DETAILS PC
no chest wall tenderness/no rhonchi/no subcutaneous emphysema/no rales/no wheezes/normal/airway patent/respirations non-labored/breath sounds equal/good air movement/clear to auscultation bilaterally/no intercostal retractions no chest wall tenderness/no rhonchi/no subcutaneous emphysema/airway patent/no wheezes/good air movement/no intercostal retractions/respirations non-labored/breath sounds equal/clear to auscultation bilaterally/no rales

## 2018-06-15 NOTE — H&P PST ADULT - NEGATIVE RESPIRATORY AND THORAX SYMPTOMS
no pleuritic chest pain/no wheezing/no dyspnea/no cough/no hemoptysis no pleuritic chest pain/no wheezing/no hemoptysis

## 2018-06-15 NOTE — H&P PST ADULT - HISTORY OF PRESENT ILLNESS
61 y/o male with multiple comorbid conditions, on medication presents to [facility] for presurgical evaluation. Complains of malfunctioning penile implant. He is scheduled for removal and replacement of all components of multicomponent inflatable penile prosthesis on 6/25/2018 63 y/o male with multiple comorbid conditions, stable on medication presents to [facility] for presurgical evaluation. Complains of malfunctioning penile implant x 6 months. He is scheduled for removal and replacement of all components of multicomponent inflatable penile prosthesis on 6/25/2018

## 2018-06-24 ENCOUNTER — TRANSCRIPTION ENCOUNTER (OUTPATIENT)
Age: 63
End: 2018-06-24

## 2018-06-25 ENCOUNTER — OUTPATIENT (OUTPATIENT)
Dept: OUTPATIENT SERVICES | Facility: HOSPITAL | Age: 63
LOS: 1 days | End: 2018-06-25
Payer: MEDICARE

## 2018-06-25 ENCOUNTER — RESULT REVIEW (OUTPATIENT)
Age: 63
End: 2018-06-25

## 2018-06-25 VITALS
TEMPERATURE: 98 F | OXYGEN SATURATION: 100 % | SYSTOLIC BLOOD PRESSURE: 141 MMHG | HEART RATE: 73 BPM | RESPIRATION RATE: 14 BRPM | DIASTOLIC BLOOD PRESSURE: 83 MMHG

## 2018-06-25 VITALS — WEIGHT: 227.96 LBS | HEIGHT: 65 IN

## 2018-06-25 DIAGNOSIS — Z01.818 ENCOUNTER FOR OTHER PREPROCEDURAL EXAMINATION: ICD-10-CM

## 2018-06-25 DIAGNOSIS — Z98.89 OTHER SPECIFIED POSTPROCEDURAL STATES: Chronic | ICD-10-CM

## 2018-06-25 DIAGNOSIS — T83.490A OTHER MECHANICAL COMPLICATION OF IMPLANTED PENILE PROSTHESIS, INITIAL ENCOUNTER: ICD-10-CM

## 2018-06-25 PROCEDURE — 88300 SURGICAL PATH GROSS: CPT | Mod: 26,59

## 2018-06-25 PROCEDURE — C1813: CPT

## 2018-06-25 PROCEDURE — 54410 REMOVE/REPLACE PENIS PROSTH: CPT | Mod: AS

## 2018-06-25 PROCEDURE — 54410 REMOVE/REPLACE PENIS PROSTH: CPT

## 2018-06-25 RX ORDER — GENTAMICIN SULFATE 40 MG/ML
VIAL (ML) INJECTION
Qty: 0 | Refills: 0 | Status: DISCONTINUED | OUTPATIENT
Start: 2018-06-25 | End: 2018-06-25

## 2018-06-25 RX ORDER — SODIUM CHLORIDE 9 MG/ML
1000 INJECTION, SOLUTION INTRAVENOUS
Qty: 0 | Refills: 0 | Status: DISCONTINUED | OUTPATIENT
Start: 2018-06-25 | End: 2018-07-03

## 2018-06-25 RX ORDER — MOXIFLOXACIN HYDROCHLORIDE TABLETS, 400 MG 400 MG/1
1 TABLET, FILM COATED ORAL
Qty: 10 | Refills: 0
Start: 2018-06-25 | End: 2018-06-29

## 2018-06-25 RX ORDER — GENTAMICIN SULFATE 40 MG/ML
80 VIAL (ML) INJECTION ONCE
Qty: 0 | Refills: 0 | Status: DISCONTINUED | OUTPATIENT
Start: 2018-06-25 | End: 2018-06-25

## 2018-06-25 RX ORDER — HYDROMORPHONE HYDROCHLORIDE 2 MG/ML
0.5 INJECTION INTRAMUSCULAR; INTRAVENOUS; SUBCUTANEOUS
Qty: 0 | Refills: 0 | Status: DISCONTINUED | OUTPATIENT
Start: 2018-06-25 | End: 2018-06-25

## 2018-06-25 RX ORDER — OXYCODONE AND ACETAMINOPHEN 5; 325 MG/1; MG/1
1 TABLET ORAL EVERY 4 HOURS
Qty: 0 | Refills: 0 | Status: DISCONTINUED | OUTPATIENT
Start: 2018-06-25 | End: 2018-06-25

## 2018-06-25 RX ORDER — ONDANSETRON 8 MG/1
4 TABLET, FILM COATED ORAL ONCE
Qty: 0 | Refills: 0 | Status: DISCONTINUED | OUTPATIENT
Start: 2018-06-25 | End: 2018-06-25

## 2018-06-25 RX ORDER — IBUPROFEN 200 MG
400 TABLET ORAL ONCE
Qty: 0 | Refills: 0 | Status: COMPLETED | OUTPATIENT
Start: 2018-06-25 | End: 2018-06-25

## 2018-06-25 RX ADMIN — Medication 400 MILLIGRAM(S): at 13:20

## 2018-06-25 RX ADMIN — OXYCODONE AND ACETAMINOPHEN 1 TABLET(S): 5; 325 TABLET ORAL at 11:25

## 2018-06-25 RX ADMIN — HYDROMORPHONE HYDROCHLORIDE 0.5 MILLIGRAM(S): 2 INJECTION INTRAMUSCULAR; INTRAVENOUS; SUBCUTANEOUS at 10:27

## 2018-06-25 RX ADMIN — Medication 400 MILLIGRAM(S): at 12:50

## 2018-06-25 RX ADMIN — HYDROMORPHONE HYDROCHLORIDE 0.5 MILLIGRAM(S): 2 INJECTION INTRAMUSCULAR; INTRAVENOUS; SUBCUTANEOUS at 10:57

## 2018-06-25 RX ADMIN — OXYCODONE AND ACETAMINOPHEN 1 TABLET(S): 5; 325 TABLET ORAL at 12:45

## 2018-06-25 NOTE — ASU DISCHARGE PLAN (ADULT/PEDIATRIC). - NOTIFY
Bleeding that does not stop Fever greater than 101/Bleeding that does not stop/Unable to Urinate/Pain not relieved by Medications

## 2018-06-25 NOTE — ASU DISCHARGE PLAN (ADULT/PEDIATRIC). - MEDICATION SUMMARY - MEDICATIONS TO TAKE
I will START or STAY ON the medications listed below when I get home from the hospital:    naproxen 500 mg oral tablet  --  by mouth   -- Indication: For as previously perscribed    Percocet 5/325 oral tablet  -- 1 tab(s) by mouth every 6 hours MDD:4  -- Caution federal law prohibits the transfer of this drug to any person other  than the person for whom it was prescribed.  May cause drowsiness.  Alcohol may intensify this effect.  Use care when operating dangerous machinery.  This prescription cannot be refilled.  This product contains acetaminophen.  Do not use  with any other product containing acetaminophen to prevent possible liver damage.  Using more of this medication than prescribed may cause serious breathing problems.    -- Indication: For pain    aspirin 81 mg oral tablet  -- 1 tab(s) by mouth once a day  -- Indication: For as previously perscribed    losartan 100 mg oral tablet  -- 1 tab(s) by mouth once a day  -- Indication: For as previously perscribed    ezetimibe 10 mg oral tablet  -- 1 tab(s) by mouth once a day  -- Indication: For as previously perscribed    Crestor 10 mg oral tablet  -- 1 tab(s) by mouth once a day (at bedtime)  -- Indication: For as previously perscribed    rOPINIRole 1 mg oral tablet  -- 1 tab(s) by mouth 3 times a day  -- Indication: For as previously perscribed    clopidogrel 75 mg oral tablet  -- 1 tab(s) by mouth once a day  -- Indication: For as previously perscribed    Coreg 6.25 mg oral tablet  -- 1 tab(s) by mouth 2 times a day  -- Indication: For as previously perscribed    Advair Diskus 250 mcg-50 mcg inhalation powder  -- 1 puff(s) inhaled 2 times a day  -- Indication: For as previously perscribed    Anoro Ellipta 62.5 mcg-25 mcg/inh inhalation powder  -- 1 puff(s) inhaled once a day  -- Indication: For as previously perscribed    donepezil 5 mg oral tablet  -- 1 tab(s) by mouth once a day (at bedtime)  -- Indication: For as previously perscribed    chlorthalidone 25 mg oral tablet  -- 1 tab(s) by mouth once a day  -- Indication: For as previously perscribed    Trulance 3 mg oral tablet  -- 1 tab(s) by mouth once a day  -- Indication: For as previously perscribed    Dexilant 60 mg oral delayed release capsule  -- 1 cap(s) by mouth once a day  -- Indication: For as previously perscribed    Cipro 500 mg oral tablet  -- 1 tab(s) by mouth every 12 hours MDD:2  -- Avoid prolonged or excessive exposure to direct and/or artificial sunlight while taking this medication.  Check with your doctor before becoming pregnant.  Do not take dairy products, antacids, or iron preparations within one hour of this medication.  Finish all this medication unless otherwise directed by prescriber.  Medication should be taken with plenty of water.    -- Indication: For abx    levothyroxine 50 mcg (0.05 mg) oral capsule  -- 1 cap(s) by mouth once a day  -- Indication: For as previously perscribed    Myrbetriq 25 mg oral tablet, extended release  -- 1 tab(s) by mouth once a day  -- Indication: For as previously perscribed    hydrALAZINE 25 mg oral tablet  -- 1 tab(s) by mouth once a day  -- Indication: For as previously perscribed    ergocalciferol 50,000 intl units (1.25 mg) oral capsule  -- 1 cap(s) by mouth once a week  -- Indication: For as previously perscribed

## 2019-01-13 ENCOUNTER — EMERGENCY (EMERGENCY)
Facility: HOSPITAL | Age: 64
LOS: 1 days | Discharge: HOME | End: 2019-01-13
Attending: EMERGENCY MEDICINE
Payer: MEDICARE

## 2019-01-13 VITALS
OXYGEN SATURATION: 97 % | SYSTOLIC BLOOD PRESSURE: 152 MMHG | HEIGHT: 65 IN | DIASTOLIC BLOOD PRESSURE: 90 MMHG | HEART RATE: 62 BPM | WEIGHT: 220.02 LBS | TEMPERATURE: 98 F | RESPIRATION RATE: 20 BRPM

## 2019-01-13 DIAGNOSIS — Z98.89 OTHER SPECIFIED POSTPROCEDURAL STATES: Chronic | ICD-10-CM

## 2019-01-13 PROCEDURE — 73130 X-RAY EXAM OF HAND: CPT | Mod: 26,LT

## 2019-01-13 PROCEDURE — 99283 EMERGENCY DEPT VISIT LOW MDM: CPT | Mod: 25

## 2019-01-13 PROCEDURE — 73130 X-RAY EXAM OF HAND: CPT

## 2019-01-13 PROCEDURE — 99283 EMERGENCY DEPT VISIT LOW MDM: CPT

## 2019-01-13 RX ORDER — IBUPROFEN 200 MG
600 TABLET ORAL ONCE
Qty: 0 | Refills: 0 | Status: COMPLETED | OUTPATIENT
Start: 2019-01-13 | End: 2019-01-13

## 2019-01-13 RX ADMIN — Medication 600 MILLIGRAM(S): at 12:09

## 2019-01-13 NOTE — ED PROVIDER NOTE - OBJECTIVE STATEMENT
62 yo male with PMHx of HTN and HLD, presenting to ED with complaints of left hand pain s/p mechanical fall 2 days ago. Patient states "I tripped and fell forward to my hands and knees. Denies LOC or head injury." Denies dizziness, SOB or CP. Endorses left hand swelling and pain with soreness to left arm.

## 2019-01-13 NOTE — ED ADULT NURSE NOTE - OBJECTIVE STATEMENT
Pt. c/o left hand pain related to injury. Pt. stated he fell 2 days ago while walking. and hit his hand. Pt. denies any dizziness. mild swelling noted.

## 2019-02-22 NOTE — ED PROVIDER NOTE - CROS ED NEURO ALL NEG
[facility]/Provider Pool:    Patient requesting antibiotic for sinus infection. Cued RX (Zpak if okay). Please reivew/sign or advise.    Received call from patient. Pt states that she has had tonsils/adenoids taken out 2 years ago because she used to get sinus infections all the time. Since having them removed she hasn't had any.    Currently pt is blowing out the yellow mucus, has congestion, voice starting to sound horse now. Pt has taken flonase, mucinex, and sinus rinse and symptoms have been ongoing since Jan 25th. Pt thinks that she is starting to get impetigo. Pt states that she is very busy and works 2 jobs, having difficulty finding time to come into clinic. Pt looking for RX for antibiotic.     Advised pt that if providers in our clinic not able to send in a prescription without OV, then try [email or web address], E-visit, or UC. Pt verbalized understanding. Pt requests that if we are unable to reach her, she is at work and please leave a detailed VM.    Saskia Lawrence RN  [facility]   negative...

## 2019-03-23 ENCOUNTER — EMERGENCY (EMERGENCY)
Facility: HOSPITAL | Age: 64
LOS: 1 days | Discharge: HOME | End: 2019-03-23
Attending: EMERGENCY MEDICINE
Payer: MEDICARE

## 2019-03-23 VITALS
RESPIRATION RATE: 18 BRPM | OXYGEN SATURATION: 96 % | TEMPERATURE: 98 F | SYSTOLIC BLOOD PRESSURE: 132 MMHG | DIASTOLIC BLOOD PRESSURE: 72 MMHG | HEART RATE: 96 BPM

## 2019-03-23 VITALS
WEIGHT: 210.1 LBS | DIASTOLIC BLOOD PRESSURE: 99 MMHG | HEART RATE: 87 BPM | HEIGHT: 65 IN | OXYGEN SATURATION: 96 % | RESPIRATION RATE: 19 BRPM | SYSTOLIC BLOOD PRESSURE: 167 MMHG | TEMPERATURE: 98 F

## 2019-03-23 DIAGNOSIS — Z95.5 PRESENCE OF CORONARY ANGIOPLASTY IMPLANT AND GRAFT: Chronic | ICD-10-CM

## 2019-03-23 DIAGNOSIS — Z98.89 OTHER SPECIFIED POSTPROCEDURAL STATES: Chronic | ICD-10-CM

## 2019-03-23 LAB
ACETONE SERPL-MCNC: NEGATIVE — SIGNIFICANT CHANGE UP
ALBUMIN SERPL ELPH-MCNC: 3.4 G/DL — LOW (ref 3.5–5)
ALP SERPL-CCNC: 74 U/L — SIGNIFICANT CHANGE UP (ref 40–120)
ALT FLD-CCNC: 31 U/L DA — SIGNIFICANT CHANGE UP (ref 10–60)
ANION GAP SERPL CALC-SCNC: 6 MMOL/L — SIGNIFICANT CHANGE UP (ref 5–17)
APPEARANCE UR: CLEAR — SIGNIFICANT CHANGE UP
AST SERPL-CCNC: 26 U/L — SIGNIFICANT CHANGE UP (ref 10–40)
BACTERIA # UR AUTO: ABNORMAL /HPF
BASOPHILS # BLD AUTO: 0.02 K/UL — SIGNIFICANT CHANGE UP (ref 0–0.2)
BASOPHILS NFR BLD AUTO: 0.3 % — SIGNIFICANT CHANGE UP (ref 0–2)
BILIRUB SERPL-MCNC: 0.3 MG/DL — SIGNIFICANT CHANGE UP (ref 0.2–1.2)
BILIRUB UR-MCNC: NEGATIVE — SIGNIFICANT CHANGE UP
BUN SERPL-MCNC: 18 MG/DL — SIGNIFICANT CHANGE UP (ref 7–18)
CALCIUM SERPL-MCNC: 9.2 MG/DL — SIGNIFICANT CHANGE UP (ref 8.4–10.5)
CHLORIDE SERPL-SCNC: 107 MMOL/L — SIGNIFICANT CHANGE UP (ref 96–108)
CK SERPL-CCNC: 52 U/L — SIGNIFICANT CHANGE UP (ref 35–232)
CO2 SERPL-SCNC: 26 MMOL/L — SIGNIFICANT CHANGE UP (ref 22–31)
COLOR SPEC: YELLOW — SIGNIFICANT CHANGE UP
CREAT SERPL-MCNC: 1.09 MG/DL — SIGNIFICANT CHANGE UP (ref 0.5–1.3)
DIFF PNL FLD: NEGATIVE — SIGNIFICANT CHANGE UP
EOSINOPHIL # BLD AUTO: 0.1 K/UL — SIGNIFICANT CHANGE UP (ref 0–0.5)
EOSINOPHIL NFR BLD AUTO: 1.4 % — SIGNIFICANT CHANGE UP (ref 0–6)
EPI CELLS # UR: ABNORMAL /HPF
FLU A RESULT: SIGNIFICANT CHANGE UP
FLU A RESULT: SIGNIFICANT CHANGE UP
FLUAV AG NPH QL: SIGNIFICANT CHANGE UP
FLUBV AG NPH QL: DETECTED
GLUCOSE SERPL-MCNC: 85 MG/DL — SIGNIFICANT CHANGE UP (ref 70–99)
GLUCOSE UR QL: NEGATIVE — SIGNIFICANT CHANGE UP
HCT VFR BLD CALC: 47.5 % — SIGNIFICANT CHANGE UP (ref 39–50)
HGB BLD-MCNC: 15.4 G/DL — SIGNIFICANT CHANGE UP (ref 13–17)
HYALINE CASTS # UR AUTO: ABNORMAL /LPF
IMM GRANULOCYTES NFR BLD AUTO: 0.1 % — SIGNIFICANT CHANGE UP (ref 0–1.5)
KETONES UR-MCNC: NEGATIVE — SIGNIFICANT CHANGE UP
LACTATE SERPL-SCNC: 0.8 MMOL/L — SIGNIFICANT CHANGE UP (ref 0.7–2)
LEUKOCYTE ESTERASE UR-ACNC: NEGATIVE — SIGNIFICANT CHANGE UP
LYMPHOCYTES # BLD AUTO: 0.95 K/UL — LOW (ref 1–3.3)
LYMPHOCYTES # BLD AUTO: 13.6 % — SIGNIFICANT CHANGE UP (ref 13–44)
MAGNESIUM SERPL-MCNC: 1.7 MG/DL — SIGNIFICANT CHANGE UP (ref 1.6–2.6)
MCHC RBC-ENTMCNC: 27.8 PG — SIGNIFICANT CHANGE UP (ref 27–34)
MCHC RBC-ENTMCNC: 32.4 GM/DL — SIGNIFICANT CHANGE UP (ref 32–36)
MCV RBC AUTO: 85.7 FL — SIGNIFICANT CHANGE UP (ref 80–100)
MONOCYTES # BLD AUTO: 0.91 K/UL — HIGH (ref 0–0.9)
MONOCYTES NFR BLD AUTO: 13 % — SIGNIFICANT CHANGE UP (ref 2–14)
NEUTROPHILS # BLD AUTO: 5.01 K/UL — SIGNIFICANT CHANGE UP (ref 1.8–7.4)
NEUTROPHILS NFR BLD AUTO: 71.6 % — SIGNIFICANT CHANGE UP (ref 43–77)
NITRITE UR-MCNC: NEGATIVE — SIGNIFICANT CHANGE UP
NRBC # BLD: 0 /100 WBCS — SIGNIFICANT CHANGE UP (ref 0–0)
NT-PROBNP SERPL-SCNC: 105 PG/ML — SIGNIFICANT CHANGE UP (ref 0–125)
PH UR: 6.5 — SIGNIFICANT CHANGE UP (ref 5–8)
PLATELET # BLD AUTO: 165 K/UL — SIGNIFICANT CHANGE UP (ref 150–400)
POTASSIUM SERPL-MCNC: 3.5 MMOL/L — SIGNIFICANT CHANGE UP (ref 3.5–5.3)
POTASSIUM SERPL-SCNC: 3.5 MMOL/L — SIGNIFICANT CHANGE UP (ref 3.5–5.3)
PROT SERPL-MCNC: 6.9 G/DL — SIGNIFICANT CHANGE UP (ref 6–8.3)
PROT UR-MCNC: 15
RBC # BLD: 5.54 M/UL — SIGNIFICANT CHANGE UP (ref 4.2–5.8)
RBC # FLD: 15 % — HIGH (ref 10.3–14.5)
RBC CASTS # UR COMP ASSIST: SIGNIFICANT CHANGE UP /HPF (ref 0–2)
RSV RESULT: SIGNIFICANT CHANGE UP
RSV RNA RESP QL NAA+PROBE: SIGNIFICANT CHANGE UP
SODIUM SERPL-SCNC: 139 MMOL/L — SIGNIFICANT CHANGE UP (ref 135–145)
SP GR SPEC: 1.02 — SIGNIFICANT CHANGE UP (ref 1.01–1.02)
TROPONIN I SERPL-MCNC: <0.015 NG/ML — SIGNIFICANT CHANGE UP (ref 0–0.04)
UROBILINOGEN FLD QL: NEGATIVE — SIGNIFICANT CHANGE UP
WBC # BLD: 7 K/UL — SIGNIFICANT CHANGE UP (ref 3.8–10.5)
WBC # FLD AUTO: 7 K/UL — SIGNIFICANT CHANGE UP (ref 3.8–10.5)
WBC UR QL: SIGNIFICANT CHANGE UP /HPF (ref 0–5)

## 2019-03-23 PROCEDURE — 84484 ASSAY OF TROPONIN QUANT: CPT

## 2019-03-23 PROCEDURE — 81001 URINALYSIS AUTO W/SCOPE: CPT

## 2019-03-23 PROCEDURE — 36415 COLL VENOUS BLD VENIPUNCTURE: CPT

## 2019-03-23 PROCEDURE — 83605 ASSAY OF LACTIC ACID: CPT

## 2019-03-23 PROCEDURE — 82009 KETONE BODYS QUAL: CPT

## 2019-03-23 PROCEDURE — 96374 THER/PROPH/DIAG INJ IV PUSH: CPT

## 2019-03-23 PROCEDURE — 99285 EMERGENCY DEPT VISIT HI MDM: CPT | Mod: 25

## 2019-03-23 PROCEDURE — 96375 TX/PRO/DX INJ NEW DRUG ADDON: CPT

## 2019-03-23 PROCEDURE — 82550 ASSAY OF CK (CPK): CPT

## 2019-03-23 PROCEDURE — 85027 COMPLETE CBC AUTOMATED: CPT

## 2019-03-23 PROCEDURE — 87086 URINE CULTURE/COLONY COUNT: CPT

## 2019-03-23 PROCEDURE — 80053 COMPREHEN METABOLIC PANEL: CPT

## 2019-03-23 PROCEDURE — 94640 AIRWAY INHALATION TREATMENT: CPT

## 2019-03-23 PROCEDURE — 83735 ASSAY OF MAGNESIUM: CPT

## 2019-03-23 PROCEDURE — 87631 RESP VIRUS 3-5 TARGETS: CPT

## 2019-03-23 PROCEDURE — 87040 BLOOD CULTURE FOR BACTERIA: CPT

## 2019-03-23 PROCEDURE — 71046 X-RAY EXAM CHEST 2 VIEWS: CPT | Mod: 26

## 2019-03-23 PROCEDURE — 83880 ASSAY OF NATRIURETIC PEPTIDE: CPT

## 2019-03-23 PROCEDURE — 71046 X-RAY EXAM CHEST 2 VIEWS: CPT

## 2019-03-23 RX ORDER — KETOROLAC TROMETHAMINE 30 MG/ML
30 SYRINGE (ML) INJECTION ONCE
Qty: 0 | Refills: 0 | Status: DISCONTINUED | OUTPATIENT
Start: 2019-03-23 | End: 2019-03-23

## 2019-03-23 RX ORDER — SODIUM CHLORIDE 9 MG/ML
1000 INJECTION INTRAMUSCULAR; INTRAVENOUS; SUBCUTANEOUS
Qty: 0 | Refills: 0 | Status: DISCONTINUED | OUTPATIENT
Start: 2019-03-23 | End: 2019-03-27

## 2019-03-23 RX ORDER — IPRATROPIUM/ALBUTEROL SULFATE 18-103MCG
3 AEROSOL WITH ADAPTER (GRAM) INHALATION
Qty: 0 | Refills: 0 | Status: COMPLETED | OUTPATIENT
Start: 2019-03-23 | End: 2019-03-23

## 2019-03-23 RX ADMIN — Medication 3 MILLILITER(S): at 18:20

## 2019-03-23 RX ADMIN — SODIUM CHLORIDE 125 MILLILITER(S): 9 INJECTION INTRAMUSCULAR; INTRAVENOUS; SUBCUTANEOUS at 18:41

## 2019-03-23 RX ADMIN — Medication 30 MILLIGRAM(S): at 18:43

## 2019-03-23 RX ADMIN — Medication 3 MILLILITER(S): at 17:45

## 2019-03-23 RX ADMIN — Medication 3 MILLILITER(S): at 18:00

## 2019-03-23 RX ADMIN — Medication 80 MILLIGRAM(S): at 18:41

## 2019-03-23 NOTE — ED PROVIDER NOTE - CONSTITUTIONAL APPEARANCE HYGIENE, MLM
HPI


Chief Complaint:  Diabetic


Time Seen by Provider:  00:08


Travel History


International Travel<30 days:  No


Contact w/Intl Traveler<30days:  No


Traveled to known affect area:  No





History of Present Illness


HPI


The patient is a 49 year old male who presents to the [facility] emergency 

department with a history of difficult to control blood sugar, reportedly over 

the last week.  On further questioning, the patient reports that he has not 

been taking his NovoLog insulin on a regular basis.  He reports that he was 

told to administer it to her 3 times per day, however on average she has been 

doing it once per day.  The patient is new to the area and has not established 

with a local primary care physician.  He reports that he was last admitted to 

the hospital related to hyperglycemia a month ago.  He did not follow-up with 

his primary care physician and [facility] after that admission as he reports 

that his doctors now too far away.  The patient reports having urinary 

frequency.  He denies having any dysuria.  The patient reports that his blood 

sugar was 400 prior to arrival, therefore he decided to come in.  He reports 

that he did administer 30 units of NovoLog.  The patient denies any recent 

fevers, cough, congestion, neck pain, chest pain, shortness of breath, 

abdominal pain, vomiting, diarrhea, or neurologic symptoms.





Critical access hospital


Past Medical History


*** Narrative Medical


The patient's past medical history is significant for myocardial infarction in 

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 

extremity, history of diabetes mellitus, hypertension, history of sleep apnea.


Cerebrovascular Accident:  Yes (CVA X2)


Diabetes:  Yes


Patient Takes Glucophage:  No


Diminished Hearing:  No


Hypertension:  Yes


Myocardial Infarction:  Yes


Sleep Apnea:  Yes





Social History


Alcohol Use:  No


Tobacco Use:  No


Substance Use:  No





Allergies-Medications


(Allergen,Severity, Reaction):  


Coded Allergies:  


     No Known Allergies (Unverified , 1/15/17)


Reported Meds & Prescriptions





Reported Meds & Active Scripts


Active


Reported


Potassium Chloride ER (Potassium Chloride) 10 Meq Cap 10 Meq PO BID


Furosemide 20 Mg Tab 20 Mg PO BID


Lisinopril 40 Mg Tab 40 Mg PO DAILY


Levemir Inj (Insulin Detemir) 1,000 unit/ 10 ML Vial 10 Units SQ BID


     Do not mix with any other Insulin.


Novolog Inj (Insulin Aspart) 1,000 Unit/10 Ml Vial 1 Units SQ ONCE








Review of Systems


General / Constitutional:  No: Fever


Eyes:  No: Visual changes


HENT:  No: Headaches


Cardiovascular:  No: Chest Pain or Discomfort


Respiratory:  No: Shortness of Breath


Gastrointestinal:  No: Nausea, Vomiting, Diarrhea, Abdominal Pain


Genitourinary:  Positive: Frequency,  No: Urgency, Dysuria, Flank Pain


Musculoskeletal:  No: Pain


Skin:  No Rash


Neurologic:  No: Weakness, Focal Abnormalities, Change in Mentation, Slurred 

Speech, Sensory Disturbance


Psychiatric:  No: Depression


Endocrine:  Positive: Polyuria,  No: Polydipsia


Hematologic/Lymphatic:  No: Easy Bruising





Physical Exam


Narrative


General: 


The patient is a well-developed well-nourished male in no acute distress. 





Head and Neck exam: 


Head is normocephalic atraumatic. 


Eyes: Pupils are equal round and reactive to light. 


Nose: Midline septum with pink mucous membranes 


Mouth: Dentition unremarkable. Moist mucus membranes. Posterior oropharynx is 

not erythematous. No tonsillar hypertrophy. Uvula midline. Airway patent. 


Neck: No palpable lymphadenopathy. No nuchal rigidity. No thyromegaly. 





Cardiovascular: 


Regular rate and rhythm without murmurs, gallops, or rubs. 





Lungs: 


Clear to auscultation bilaterally. No wheezes, rhonchi, or rales.


 


Abdomen:


Soft, without tenderness to palpation in all 4 quadrants of the abdomen. No 

guarding, rebound, or rigidity.  Normal bowel sounds are audible.  No 

tenderness on palpation of McBurney's point.





Extremities: 


No clubbing, cyanosis, or edema. 2+ pulses in all 4 extremities.  No calf 

tenderness on palpation.





Back: 


No spinous process tenderness to palpation. No costovertebral angle tenderness 

to palpation. 





Neurologic Exam: Grossly nonfocal 





Skin Exam: No rash noted. Intact skin that is warm and dry.





Data


Data


Last Documented VS





Vital Signs








  Date Time  Temp Pulse Resp B/P Pulse Ox O2 Delivery O2 Flow Rate FiO2


 


1/16/17 03:12  71 17 142/95 98   


 


1/16/17 00:30      Room Air  


 


1/15/17 23:20 97.7       








Orders





 Electrocardiogram (1/16/17 00:08)


Complete Blood Count With Diff (1/16/17 00:08)


Comprehensive Metabolic Panel (1/16/17 00:08)


Lipase (1/16/17 00:08)


Urinalysis - C+S If Indicated (1/16/17 00:08)


Magnesium (Mg) (1/16/17 00:08)


Beta Hydroxybutyrate (Acetone) (1/16/17 00:08)


Chest, Single Ap (1/16/17 00:08)


Iv Access Insert/Monitor (1/16/17 00:08)


Ecg Monitoring (1/16/17 00:08)


Oximetry (1/16/17 00:08)


Sodium Chlor 0.9% 1000 Ml Inj (Ns 1000 M (1/16/17 00:15)


B-Type Natriuretic Peptide (1/16/17 00:09)


Ckmb (Isoenzyme) Profile (1/16/17 00:08)


Troponin I (1/16/17 00:08)


CKMB (1/16/17 00:20)


CKMB% (1/16/17 00:20)


Insulin Human Regular Inj (Novolin R Inj (1/16/17 01:30)


Sodium Chlorid 0.9% 500 Ml Inj (Ns 500 M (1/16/17 01:30)


Blood Glucose (1/16/17 01:25)





Labs





 Laboratory Tests








Test 1/16/17 1/16/17





 00:20 00:35


 


White Blood Count 6.0 TH/MM3 


 


Red Blood Count 4.44 MIL/MM3 


 


Hemoglobin 12.9 GM/DL 


 


Hematocrit 37.2 % 


 


Mean Corpuscular Volume 83.8 FL 


 


Mean Corpuscular Hemoglobin 29.1 PG 


 


Mean Corpuscular Hemoglobin 34.7 % 





Concent  


 


Red Cell Distribution Width 13.1 % 


 


Platelet Count 162 TH/MM3 


 


Mean Platelet Volume 9.3 FL 


 


Neutrophils (%) (Auto) 44.2 % 


 


Lymphocytes (%) (Auto) 45.2 % 


 


Monocytes (%) (Auto) 6.1 % 


 


Eosinophils (%) (Auto) 3.4 % 


 


Basophils (%) (Auto) 1.1 % 


 


Neutrophils # (Auto) 2.7 TH/MM3 


 


Lymphocytes # (Auto) 2.7 TH/MM3 


 


Monocytes # (Auto) 0.4 TH/MM3 


 


Eosinophils # (Auto) 0.2 TH/MM3 


 


Basophils # (Auto) 0.1 TH/MM3 


 


CBC Comment DIFF FINAL  


 


Differential Comment   


 


Sodium Level 132 MEQ/L 


 


Potassium Level 3.5 MEQ/L 


 


Chloride Level 95 MEQ/L 


 


Carbon Dioxide Level 29.1 MEQ/L 


 


Anion Gap 8 MEQ/L 


 


Blood Urea Nitrogen 22 MG/DL 


 


Creatinine 1.95 MG/DL 


 


Estimat Glomerular Filtration 44 ML/MIN 





Rate  


 


Random Glucose 426 MG/DL 


 


Calcium Level 8.8 MG/DL 


 


Magnesium Level 2.0 MG/DL 


 


Total Bilirubin 0.3 MG/DL 


 


Aspartate Amino Transf 15 U/L 





(AST/SGOT)  


 


Alanine Aminotransferase 22 U/L 





(ALT/SGPT)  


 


Alkaline Phosphatase 118 U/L 


 


Total Creatine Kinase 270 U/L 


 


Creatine Kinase MB 2.4 NG/ML 


 


Troponin I LESS THAN 0.02 





 NG/ML 


 


B-Type Natriuretic Peptide 8 PG/ML 


 


Total Protein 7.2 GM/DL 


 


Albumin 3.3 GM/DL 


 


Lipase 174 U/L 


 


B-Hydroxybutyrate 0.09 MMOL/L 


 


Urine Color  LIGHT-YELLOW 


 


Urine Turbidity  CLEAR 


 


Urine pH  5.5 


 


Urine Specific Gravity  1.022 


 


Urine Protein  30 mg/dL


 


Urine Glucose (UA)  1000 mg/dL


 


Urine Ketones  NEG mg/dL


 


Urine Occult Blood  NEG 


 


Urine Nitrite  NEG 


 


Urine Bilirubin  NEG 


 


Urine Urobilinogen  LESS THAN 2.0





  MG/DL


 


Urine Leukocyte Esterase  NEG 


 


Urine RBC  2 /hpf


 


Urine WBC  1 /hpf


 


Microscopic Urinalysis Comment  CULT NOT





  INDICATED











MDM


Medical Decision Making


Medical Screen Exam Complete:  Yes


Emergency Medical Condition:  Yes


Medical Record Reviewed:  Yes


Interpretation(s)





Last Impressions








Chest X-Ray 1/16/17 0008 Signed





Impressions: 





 Service Date/Time:  Monday, January 16, 2017 00:22 - CONCLUSION: No acute 





 disease.       Shashank Jarquin MD 








Differential Diagnosis


DKA, versus hyperglycemia for medication noncompliance, versus infectious 

process causing hyperglycemia, versus acute coronary syndrome causing 

hyperglycemia


Narrative Course


During the course of the patients emergency department visit, the patients 

history, examination, and differential diagnosis were reviewed with the 

patient. The patient had  IV access obtained and blood work sent for analysis.  

The patient had an EKG done.  The patient was placed on a cardiac monitor with 

oximetry and blood pressure monitoring.  The patient's EKG shows a sinus rhythm 

heart rate is 64, no acute ST segment changes are noted.,  No acute ST segment 

elevation, no ST segment depression.





The patient was provided normal saline 1 L IV fluid bolus.  The patient was 

given regular insulin 12 units subcutaneously times one.  The patient was given 

a repeat normal saline 500 mL bolus times one.





The patients laboratory studies were reviewed and remarkable for a CBC that 

shows a white count of 6, hemoglobin 12.9, platelets 162 with 45.2 lymphocytes, 

CMP is remarkable for a sodium of 132, chloride 95, BUN 22, creatinine 1.95, 

glucose 426, alkaline phosphatase 118, CPK and troponin I within normal limits, 

BNP is 8, lipase 174.  Urinalysis showed 30 protein at thousand glucose 

otherwise unremarkable.  Beta hydroxybutyrate is negative at 0.09.





Radiology studies were reviewed and remarkable for a chest x-ray that shows no 

acute abnormality.





Once the patient's blood sugar began to improve, the patient will be discharged 

home to follow-up with patient assistance.  I discussed with the patient and 

the importance of taking his insulin as prescribed.  Him and his family 

expressed understanding.  The patient's blood sugar was improving and 10 down 

to 347.  This will again be rechecked at after his second fluid bolus and 

insulin subcutaneous.  Last blood sugar prior to discharge Mr. 191.  The 

patient reportedly felt improved.





The patient is resting comfortably and feels better, is alert and in no 

distress. The patients results and examination findings were discussed with 

the patient.  The repeat examination is unremarkable and benign. The history, 

exam, diagnostic testing, and current condition do not suggest any significant 

pathology to warrant further testing, continued ED treatment, admission, or 

surgical evaluation at this point. The vital signs have been stable. The 

patient does not have uncontrollable pain, intractable vomiting, or other 

significant symptoms. The patient's condition is stable and appropriate for 

discharge. The patient will pursue further outpatient evaluation with a primary 

care physician or other designated or consulting physician as indicated in the 

discharge instructions. The patient expressed understanding and was agreeable 

with this plan.





Diagnosis





 Primary Impression:  


 Hyperglycemia due to type 2 diabetes mellitus


 Qualified Code:  E11.65 - Type 2 diabetes mellitus with hyperglycemia, with 

long-term current use of insulin


 Additional Impression:  


 Noncompliance with medication regimen


Referrals:  


Patient Assistance Program


2 days


Patient Instructions:  Diabetic Hyperglycemia (ED), General Instructions





***Additional Instructions:


Take your medications as previously prescribed and check her blood sugar 3 

times a day.


***Med/Other Pt SpecificInfo:  No Change to Meds


Disposition:  01 DISCHARGE HOME


Condition:  Stable








Faby Renteria MD Jan 16, 2017 01:30
well appearing

## 2019-03-23 NOTE — ED ADULT NURSE NOTE - NSIMPLEMENTINTERV_GEN_ALL_ED
Implemented All Universal Safety Interventions:  [place] to call system. Call bell, personal items and telephone within reach. Instruct patient to call for assistance. Room bathroom lighting operational. Non-slip footwear when patient is off stretcher. Physically safe environment: no spills, clutter or unnecessary equipment. Stretcher in lowest position, wheels locked, appropriate side rails in place.

## 2019-03-23 NOTE — ED PROVIDER NOTE - OBJECTIVE STATEMENT
62 y/o M patient with significant PMHx of Asthma, BPH, CAD, cardiac abnormality cardiac stent, COPD, Dyslipidemia, GERD, IBS, impotence sexual, insomnia, MI, sleep apnea, prostate cancer, stress incontinence, urinary incontinence and significant PSHx of prostatectomy, penile abnormality, UPPP, stented coronary artery presents to the ED with c/o intermittent fever x 3 weeks with associated symptoms of cough, wheezing, weakness, no appetite. Patient states that he was given Z- Pac 2w ago and was completed. Patient reports going to the PMD on Thursday and was given cough medications. As per patient's wife, she decided to give the patient amoxicillin since yesterday and last dose was yesterday.  Patient denies any recent traveling, sick contacts, or any other complains. ADAM

## 2019-03-23 NOTE — ED PROVIDER NOTE - PSH
H/O prostatectomy  2011  Penile abnormality  insertion of penile prothesis 11/2013  S/P colonoscopy  and endoscopy 6/2013  S/P prostatectomy  2/2011  S/P UPPP (Uvulopalatopharyngoplasty)  2008  S/P uvulopalatopharyngoplasty  2009  Stented coronary artery

## 2019-03-24 LAB
CULTURE RESULTS: NO GROWTH — SIGNIFICANT CHANGE UP
SPECIMEN SOURCE: SIGNIFICANT CHANGE UP

## 2019-03-28 LAB
CULTURE RESULTS: SIGNIFICANT CHANGE UP
CULTURE RESULTS: SIGNIFICANT CHANGE UP
SPECIMEN SOURCE: SIGNIFICANT CHANGE UP
SPECIMEN SOURCE: SIGNIFICANT CHANGE UP

## 2019-10-25 NOTE — H&P PST ADULT - BLOOD TRANSFUSION, PREVIOUS, PROFILE
I spoke with patient she states she has tried Vitamin B6, Unisom and rody gum  Patient states she did not want to take any medication for  the nausea but will talk to her   Please send a script to patients pharmacy in chart  no

## 2019-12-22 NOTE — H&P ADULT. - BACK EXAM
Nephrology Interval Progress Note    Int Hx:  Laying in bed.  Sleepy    MEDICATIONS:  Scheduled:   • nystatin  500,000 Units Swish & Spit 4x Daily   • cefTRIAXone (ROCEPHIN) IV  1,000 mg Intravenous Daily   • docusate sodium-sennosides  2 tablet PEG Tube Nightly   • esomeprazole  40 mg PEG Tube 2 times per day   • folic acid  1 mg PEG Tube Daily   • levETIRAcetam  500 mg PEG Tube 2 times per day   • levothyroxine  100 mcg PEG Tube QAM AC   • modafinil  200 mg PEG Tube Daily   • heparin (porcine)  7,500 Units Subcutaneous 3 times per day   • dorzolamide  1 drop Both Eyes Daily    And   • timolol  1 drop Both Eyes Daily   • insulin lispro   Subcutaneous 4 times per day   • brimonidine  1 drop Both Eyes 2 times per day   • latanoprost  1 drop Both Eyes Daily    Continuous Infusions:   • dextrose 5 % infusion Stopped (12/12/19 1509)    PRN: polyethylene glycol, acetaminophen **OR** [DISCONTINUED] acetaminophen, sodium chloride, sodium chloride, potassium CHLORIDE, potassium CHLORIDE, potassium CHLORIDE 20 mEq/100mL IVPB, potassium CHLORIDE, potassium CHLORIDE, potassium CHLORIDE 20 mEq/100mL IVPB, magnesium sulfate, magnesium sulfate, magnesium sulfate, dextrose, dextrose, dextrose, glucagon, dextrose, dextrose, albuterol-ipratropium 2.5 mg/0.5 mg, sodium chloride, sodium chloride (PF), acetaminophen, labetalol, hydrALAZINE, calcium gluconate IVPB, ondansetron    Physical assessment  Visit Vitals  /60 (BP Location: RUE - Right upper extremity, Patient Position: Semi-Schultz's)   Pulse 87   Temp 98.2 °F (36.8 °C) (Oral)   Resp 18   Ht 5' 2\" (1.575 m)   Wt 114.7 kg   SpO2 96%   BMI 46.25 kg/m²       Weight over the past 48 Hours:  Patient Vitals for the past 48 hrs:   Weight   12/21/19 0500 115.5 kg   12/22/19 0430 114.7 kg      Intake/Output:  I/O this shift:  In: 685 [NG/GT:685]  Out: 450 [Urine:450] I/O last 3 completed shifts:  In: 2256 [NG/GT:2256]  Out: 875 [Urine:875]     Intake/Output Summary (Last 24 hours)  at 12/22/2019 1318  Last data filed at 12/22/2019 1202  Gross per 24 hour   Intake 1724 ml   Output 975 ml   Net 749 ml    Last Stool Occurrence: 1 (12/21/19 2000)    GEN: Tired, NAD. +Pallor  CV: nl S1, S2. No appreciable rub. No LE edema  PULM: CTA anterior no IWOB at rest.   ABD: Soft, NT, ND. +BS. +PEG tube  MSK: No clubbing/cyanosis.  : No Braden  DERM: No new rashes noted exposed areas. Warm / dry  NEURO / PSYCH: sleepy      Laboratory Results:  Recent Labs   Lab 12/22/19  0528 12/21/19  0615 12/20/19  0645 12/19/19  0557 12/18/19  0510 12/17/19  0540 12/16/19  1142   WBC 10.3 9.8 8.6 8.7 9.3 8.7 10.5   HGB 9.2* 8.4* 7.7* 7.7* 8.1* 8.1* 9.5*   HCT 30.7* 28.0* 25.1* 25.5* 27.0* 28.0* 31.5*    307 271 326 319 345 411      Recent Labs   Lab 12/22/19  0528 12/21/19  0615 12/20/19  0645 12/19/19  0557 12/18/19  0510 12/17/19  0540 12/16/19  0814   SODIUM 147* 147* 143 142 141 145 141   POTASSIUM 3.7 3.6 3.4 3.5 3.7 3.8 3.5   CHLORIDE 117* 119* 119* 120* 119* 120* 116*   CO2 23 23 20* 18* 17* 17* 16*   BUN 50* 55* 58* 62* 61* 53* 46*   CREATININE 1.61* 1.84* 2.01* 2.50* 2.97* 3.08* 2.58*   GLUCOSE 186* 179* 203* 199* 96 118* 174*   CALCIUM 9.5 9.1 9.0 8.8 8.9 8.4 8.9       Urine Panel  Lab Results   Component Value Date    SHANTAL 21 12/17/2019    UKET NEGATIVE 12/17/2019    UPROT NEGATIVE 12/17/2019    URBC MODERATE (A) 12/17/2019    UBILI NEGATIVE 12/17/2019    UPH 5.0 12/17/2019    USPG 1.019 12/17/2019    UBACTR MODERATE (A) 12/17/2019       IMAGING:  Renal u/s  The right kidney measures 10.5 x 5.4 x 5.3 cm. Normal cortical echotexture  and thickness. No hydronephrosis, echogenic renal calculi, or perinephric  fluid collections. Parenchymal flow is present on Doppler.     The left kidney measures 9.1 x 4.9 x 5.1 cm. Normal cortical echotexture  and thickness. No hydronephrosis, echogenic renal calculi, or perinephric  fluid collections. Parenchymal flow is present on Doppler.     The urinary bladder is not  well visualized.    TTE  Left-to-right shunt seen at the atrial level by color Doppler.  Agitated saline was injected through a peripheral vein and did not show evidence of a shunt.  Normal left ventricular cavity size and systolic function, EF 64 %.  No regional wall motion abnormalities. Global longitudinal strain -19 %.  Normal right ventricular size and systolic function.  No significant valve abnormalities.  No pericardial effusion.  Compared to the prior study (10/23/14), a L -> R shunt is now visualized by color Doppler. No other significant changes.    IMP:  -Acute Kidney Injury, severe. Prior ALEXEI on this admission as well. Renal u/s unremarkable, urine sodium borderline low. Urine eos negative. Improving on IVF  -CKD III at baseline. Age related loss. Baseline creatinine 0.7-0.8mg/dl range  -Hypotension  -Metabolic Acidosis  -Anemia  -H/o hypomagnesemia. Stable mag levels currently  -Anemia. No clear source on endoscopy    PLAN:  Improving renal function. Stable lytes  Monitor magnesium as h/o hypomagnesemia, on PPI thus at risk.  Renal adjust medications, avoid nephrotoxic agents / NSAIDs. No Fleets  No ACEi/ARB   Labs am    Obi Clemens MD   12/22/2019  Nephrology       normal/strength intact/normal shape/ROM intact

## 2020-05-24 ENCOUNTER — INPATIENT (INPATIENT)
Facility: HOSPITAL | Age: 65
LOS: 1 days | Discharge: HOME | DRG: 312 | End: 2020-05-26
Attending: INTERNAL MEDICINE | Admitting: INTERNAL MEDICINE
Payer: MEDICARE

## 2020-05-24 VITALS
HEART RATE: 63 BPM | HEIGHT: 67 IN | RESPIRATION RATE: 17 BRPM | OXYGEN SATURATION: 95 % | SYSTOLIC BLOOD PRESSURE: 90 MMHG | WEIGHT: 210.1 LBS | TEMPERATURE: 98 F | DIASTOLIC BLOOD PRESSURE: 58 MMHG

## 2020-05-24 DIAGNOSIS — R55 SYNCOPE AND COLLAPSE: ICD-10-CM

## 2020-05-24 DIAGNOSIS — Z29.9 ENCOUNTER FOR PROPHYLACTIC MEASURES, UNSPECIFIED: ICD-10-CM

## 2020-05-24 DIAGNOSIS — K58.9 IRRITABLE BOWEL SYNDROME WITHOUT DIARRHEA: ICD-10-CM

## 2020-05-24 DIAGNOSIS — E78.5 HYPERLIPIDEMIA, UNSPECIFIED: ICD-10-CM

## 2020-05-24 DIAGNOSIS — J45.909 UNSPECIFIED ASTHMA, UNCOMPLICATED: ICD-10-CM

## 2020-05-24 DIAGNOSIS — I25.10 ATHEROSCLEROTIC HEART DISEASE OF NATIVE CORONARY ARTERY WITHOUT ANGINA PECTORIS: ICD-10-CM

## 2020-05-24 DIAGNOSIS — I95.9 HYPOTENSION, UNSPECIFIED: ICD-10-CM

## 2020-05-24 DIAGNOSIS — Z95.5 PRESENCE OF CORONARY ANGIOPLASTY IMPLANT AND GRAFT: Chronic | ICD-10-CM

## 2020-05-24 LAB
ALBUMIN SERPL ELPH-MCNC: 3.5 G/DL — SIGNIFICANT CHANGE UP (ref 3.5–5)
ALP SERPL-CCNC: 85 U/L — SIGNIFICANT CHANGE UP (ref 40–120)
ALT FLD-CCNC: 34 U/L DA — SIGNIFICANT CHANGE UP (ref 10–60)
ANION GAP SERPL CALC-SCNC: 9 MMOL/L — SIGNIFICANT CHANGE UP (ref 5–17)
APPEARANCE UR: CLEAR — SIGNIFICANT CHANGE UP
APTT BLD: 31.6 SEC — SIGNIFICANT CHANGE UP (ref 27.5–36.3)
AST SERPL-CCNC: 15 U/L — SIGNIFICANT CHANGE UP (ref 10–40)
BACTERIA # UR AUTO: ABNORMAL /HPF
BASOPHILS # BLD AUTO: 0.08 K/UL — SIGNIFICANT CHANGE UP (ref 0–0.2)
BASOPHILS NFR BLD AUTO: 0.6 % — SIGNIFICANT CHANGE UP (ref 0–2)
BILIRUB SERPL-MCNC: 0.2 MG/DL — SIGNIFICANT CHANGE UP (ref 0.2–1.2)
BILIRUB UR-MCNC: NEGATIVE — SIGNIFICANT CHANGE UP
BUN SERPL-MCNC: 22 MG/DL — HIGH (ref 7–18)
CALCIUM SERPL-MCNC: 9.2 MG/DL — SIGNIFICANT CHANGE UP (ref 8.4–10.5)
CHLORIDE SERPL-SCNC: 111 MMOL/L — HIGH (ref 96–108)
CO2 SERPL-SCNC: 22 MMOL/L — SIGNIFICANT CHANGE UP (ref 22–31)
COLOR SPEC: YELLOW — SIGNIFICANT CHANGE UP
COMMENT - URINE: SIGNIFICANT CHANGE UP
CREAT SERPL-MCNC: 1.22 MG/DL — SIGNIFICANT CHANGE UP (ref 0.5–1.3)
D DIMER BLD IA.RAPID-MCNC: <150 NG/ML DDU — SIGNIFICANT CHANGE UP
DIFF PNL FLD: NEGATIVE — SIGNIFICANT CHANGE UP
EOSINOPHIL # BLD AUTO: 0.08 K/UL — SIGNIFICANT CHANGE UP (ref 0–0.5)
EOSINOPHIL NFR BLD AUTO: 0.6 % — SIGNIFICANT CHANGE UP (ref 0–6)
EPI CELLS # UR: ABNORMAL /HPF
ETHANOL SERPL-MCNC: 41 MG/DL — HIGH (ref 0–10)
GLUCOSE SERPL-MCNC: 98 MG/DL — SIGNIFICANT CHANGE UP (ref 70–99)
GLUCOSE UR QL: NEGATIVE — SIGNIFICANT CHANGE UP
GRAN CASTS # UR COMP ASSIST: ABNORMAL /LPF
HCT VFR BLD CALC: 48.8 % — SIGNIFICANT CHANGE UP (ref 39–50)
HGB BLD-MCNC: 15.9 G/DL — SIGNIFICANT CHANGE UP (ref 13–17)
HYALINE CASTS # UR AUTO: ABNORMAL /LPF
IMM GRANULOCYTES NFR BLD AUTO: 0.6 % — SIGNIFICANT CHANGE UP (ref 0–1.5)
INR BLD: 1.08 RATIO — SIGNIFICANT CHANGE UP (ref 0.88–1.16)
KETONES UR-MCNC: ABNORMAL
LACTATE SERPL-SCNC: 2.5 MMOL/L — HIGH (ref 0.7–2)
LACTATE SERPL-SCNC: 3.2 MMOL/L — HIGH (ref 0.7–2)
LEUKOCYTE ESTERASE UR-ACNC: NEGATIVE — SIGNIFICANT CHANGE UP
LIDOCAIN IGE QN: 67 U/L — LOW (ref 73–393)
LYMPHOCYTES # BLD AUTO: 14.6 % — SIGNIFICANT CHANGE UP (ref 13–44)
LYMPHOCYTES # BLD AUTO: 2.09 K/UL — SIGNIFICANT CHANGE UP (ref 1–3.3)
MCHC RBC-ENTMCNC: 29.6 PG — SIGNIFICANT CHANGE UP (ref 27–34)
MCHC RBC-ENTMCNC: 32.6 GM/DL — SIGNIFICANT CHANGE UP (ref 32–36)
MCV RBC AUTO: 90.9 FL — SIGNIFICANT CHANGE UP (ref 80–100)
MONOCYTES # BLD AUTO: 0.85 K/UL — SIGNIFICANT CHANGE UP (ref 0–0.9)
MONOCYTES NFR BLD AUTO: 5.9 % — SIGNIFICANT CHANGE UP (ref 2–14)
NEUTROPHILS # BLD AUTO: 11.11 K/UL — HIGH (ref 1.8–7.4)
NEUTROPHILS NFR BLD AUTO: 77.7 % — HIGH (ref 43–77)
NITRITE UR-MCNC: NEGATIVE — SIGNIFICANT CHANGE UP
NRBC # BLD: 0 /100 WBCS — SIGNIFICANT CHANGE UP (ref 0–0)
PH UR: 5 — SIGNIFICANT CHANGE UP (ref 5–8)
PLATELET # BLD AUTO: 210 K/UL — SIGNIFICANT CHANGE UP (ref 150–400)
POTASSIUM SERPL-MCNC: 3.9 MMOL/L — SIGNIFICANT CHANGE UP (ref 3.5–5.3)
POTASSIUM SERPL-SCNC: 3.9 MMOL/L — SIGNIFICANT CHANGE UP (ref 3.5–5.3)
PROT SERPL-MCNC: 6.7 G/DL — SIGNIFICANT CHANGE UP (ref 6–8.3)
PROT UR-MCNC: 30 MG/DL
PROTHROM AB SERPL-ACNC: 12.2 SEC — SIGNIFICANT CHANGE UP (ref 10–12.9)
RBC # BLD: 5.37 M/UL — SIGNIFICANT CHANGE UP (ref 4.2–5.8)
RBC # FLD: 13.8 % — SIGNIFICANT CHANGE UP (ref 10.3–14.5)
RBC CASTS # UR COMP ASSIST: SIGNIFICANT CHANGE UP /HPF (ref 0–2)
SODIUM SERPL-SCNC: 142 MMOL/L — SIGNIFICANT CHANGE UP (ref 135–145)
SP GR SPEC: 1.02 — SIGNIFICANT CHANGE UP (ref 1.01–1.02)
TROPONIN I SERPL-MCNC: <0.015 NG/ML — SIGNIFICANT CHANGE UP (ref 0–0.04)
UROBILINOGEN FLD QL: NEGATIVE — SIGNIFICANT CHANGE UP
WBC # BLD: 14.3 K/UL — HIGH (ref 3.8–10.5)
WBC # FLD AUTO: 14.3 K/UL — HIGH (ref 3.8–10.5)
WBC UR QL: SIGNIFICANT CHANGE UP /HPF (ref 0–5)

## 2020-05-24 PROCEDURE — 99285 EMERGENCY DEPT VISIT HI MDM: CPT

## 2020-05-24 PROCEDURE — 71045 X-RAY EXAM CHEST 1 VIEW: CPT | Mod: 26

## 2020-05-24 RX ORDER — MIRABEGRON 50 MG/1
1 TABLET, EXTENDED RELEASE ORAL
Qty: 0 | Refills: 0 | DISCHARGE

## 2020-05-24 RX ORDER — FLUTICASONE PROPIONATE AND SALMETEROL 50; 250 UG/1; UG/1
1 POWDER ORAL; RESPIRATORY (INHALATION)
Qty: 0 | Refills: 0 | DISCHARGE

## 2020-05-24 RX ORDER — UMECLIDINIUM BROMIDE AND VILANTEROL TRIFENATATE 62.5; 25 UG/1; UG/1
1 POWDER RESPIRATORY (INHALATION)
Qty: 0 | Refills: 0 | DISCHARGE

## 2020-05-24 RX ORDER — ROPINIROLE 8 MG/1
1 TABLET, FILM COATED, EXTENDED RELEASE ORAL
Qty: 0 | Refills: 0 | DISCHARGE

## 2020-05-24 RX ORDER — SODIUM CHLORIDE 9 MG/ML
1000 INJECTION INTRAMUSCULAR; INTRAVENOUS; SUBCUTANEOUS ONCE
Refills: 0 | Status: COMPLETED | OUTPATIENT
Start: 2020-05-24 | End: 2020-05-24

## 2020-05-24 RX ORDER — ASPIRIN/CALCIUM CARB/MAGNESIUM 324 MG
1 TABLET ORAL
Qty: 0 | Refills: 0 | DISCHARGE

## 2020-05-24 RX ORDER — HYDRALAZINE HCL 50 MG
1 TABLET ORAL
Qty: 0 | Refills: 0 | DISCHARGE

## 2020-05-24 RX ORDER — CLOPIDOGREL BISULFATE 75 MG/1
1 TABLET, FILM COATED ORAL
Qty: 0 | Refills: 0 | DISCHARGE

## 2020-05-24 RX ORDER — EZETIMIBE 10 MG/1
1 TABLET ORAL
Qty: 0 | Refills: 0 | DISCHARGE

## 2020-05-24 RX ORDER — CEFTRIAXONE 500 MG/1
1000 INJECTION, POWDER, FOR SOLUTION INTRAMUSCULAR; INTRAVENOUS ONCE
Refills: 0 | Status: COMPLETED | OUTPATIENT
Start: 2020-05-24 | End: 2020-05-24

## 2020-05-24 RX ORDER — CALCIUM GLUCONATE 100 MG/ML
1 VIAL (ML) INTRAVENOUS ONCE
Refills: 0 | Status: COMPLETED | OUTPATIENT
Start: 2020-05-24 | End: 2020-05-24

## 2020-05-24 RX ORDER — ERGOCALCIFEROL 1.25 MG/1
1 CAPSULE ORAL
Qty: 0 | Refills: 0 | DISCHARGE

## 2020-05-24 RX ORDER — MIRTAZAPINE 45 MG/1
1 TABLET, ORALLY DISINTEGRATING ORAL
Qty: 0 | Refills: 0 | DISCHARGE

## 2020-05-24 RX ORDER — ROSUVASTATIN CALCIUM 5 MG/1
1 TABLET ORAL
Qty: 0 | Refills: 0 | DISCHARGE

## 2020-05-24 RX ORDER — VORTIOXETINE 5 MG/1
1 TABLET, FILM COATED ORAL
Qty: 0 | Refills: 0 | DISCHARGE

## 2020-05-24 RX ORDER — RANOLAZINE 500 MG/1
1 TABLET, FILM COATED, EXTENDED RELEASE ORAL
Qty: 0 | Refills: 0 | DISCHARGE

## 2020-05-24 RX ORDER — LOSARTAN POTASSIUM 100 MG/1
1 TABLET, FILM COATED ORAL
Qty: 0 | Refills: 0 | DISCHARGE

## 2020-05-24 RX ADMIN — SODIUM CHLORIDE 1000 MILLILITER(S): 9 INJECTION INTRAMUSCULAR; INTRAVENOUS; SUBCUTANEOUS at 20:34

## 2020-05-24 RX ADMIN — Medication 100 GRAM(S): at 17:55

## 2020-05-24 RX ADMIN — CEFTRIAXONE 100 MILLIGRAM(S): 500 INJECTION, POWDER, FOR SOLUTION INTRAMUSCULAR; INTRAVENOUS at 20:34

## 2020-05-24 RX ADMIN — SODIUM CHLORIDE 1000 MILLILITER(S): 9 INJECTION INTRAMUSCULAR; INTRAVENOUS; SUBCUTANEOUS at 20:58

## 2020-05-24 RX ADMIN — Medication 1 GRAM(S): at 18:00

## 2020-05-24 NOTE — H&P ADULT - PROBLEM SELECTOR PLAN 4
s/p stent  continue asa,  pt on repatha at home, will start statin inpatient  f/u lipid profile, a1c

## 2020-05-24 NOTE — ED PROVIDER NOTE - CLINICAL SUMMARY MEDICAL DECISION MAKING FREE TEXT BOX
Patient on calcium channel blocker. Has had episodes of hypoglycemia followed by pallor and diaphoresis in the past. Consider medication OD. Will do trial of calcium gluconate, labs and admission.

## 2020-05-24 NOTE — H&P ADULT - PROBLEM SELECTOR PLAN 5
patient with IBS-Constipation type, last BM this AM  continue laxatives patient with copd/asthma, not in exacerbation  saturating well on room air  on advair and ellipta at home, will place on duoneb while inpatient

## 2020-05-24 NOTE — H&P ADULT - PROBLEM SELECTOR PLAN 1
-presented s/p syncopal episode, reports chest pressure and dizziness prior to episode, also reports last meal was in AM, had been out visiting his friends (possibility of exertion)  -trops noted negative, ekg with sinus john, 1st degree av block, nonspecific t w changes  -etiology could be ACS, arrhythmia, dehydration causing hypotension , vasovagal or autonomic dysfunction (patient on ropinirole for tremor)  -will place on telemonitoring and get orthostats  -trend cardiac enzymes, f/u echo, CD  -holding off chlorthalidone and verapamil for now  -continue to monitor blood pressure and adjust medications accordingly (patient does reports blood pressure has been labile in the past)

## 2020-05-24 NOTE — H&P ADULT - PROBLEM SELECTOR PLAN 3
patient with copd/asthma, not in exacerbation  saturating well on room air  on advair and ellipta at home, will place on duoneb while inpatient plan as above

## 2020-05-24 NOTE — H&P ADULT - HISTORY OF PRESENT ILLNESS
Patient is a 64M with Asthma, BPH, CAD s/p stent, COPD, GERD, HTN, HLD, IBS, MI and a significant PSHx of colonoscopy, prostatectomy, UPPP presents to the ED s/p syncopal episode. Patient is a 64M with Asthma, BPH, CAD s/p stent, COPD, GERD, HTN, HLD, IBS, MI and a significant PSHx of colonoscopy, prostatectomy, penile implant, UPPP presents to the ED s/p syncopal episode. Patient reports he has been out since morning visiting people as it was his friend's birthday, around 2pm  he stood to his friends on the street when he suddenly felt a heavy, burning pain over left side of chest radiating to the front where he felt a heavy pressure, 10/10 in severity, associated with difficulty breathing and dizziness following which he passed out. According to his son, he did not fall, had his eyes open but was unresponsive and weak, suffered no head injuries or witnessed seizures. EMS was called and found the patient responsive, reported feeling weak. Patient denies palpitations, nausea, sweating, pain in arm or jaw, no fevers, chills or cough, denies sick contacts. He does report 6-7 month history of dyspnea on exertion for which work up was being initiated and was supposed to get PFTs however was postponed due to covid pandemic. Patient is unsure when his last echo was, reports he had a normal angiogram in nov/dec 2019. Currently reports generalized weakness and back pain.   In ER, he was noted to have a BP of 90/58, was given 2l bolus of NS following which it improved, currently with sbp of 117. Patient was also given a dose of calcium gluconate for possibility of CCB toxicity given he is on verapamil at home and was bradycardic to 57 in addition to hypotension. Labs also significant for wbc count of 14.3, lactate 3.2, UA and CXR were negative, he was given 1 dose of Rocephin. His BAL was noted 41 though patient denies alcohol ingestion saying last drink was 1 week ago. Patient is a 64M with Asthma, BPH, CAD s/p stent, COPD, GERD, HTN, HLD, IBS, MI and a significant PSHx of colonoscopy, prostatectomy, penile implant, UPPP presents to the ED s/p syncopal episode. Patient reports around 2pm he stood talking to his friends on the street when he suddenly felt a burning pain over left side of chest radiating to the front where he felt a heavy pressure, 10/10 in severity, associated with difficulty breathing and dizziness following which he passed out. According to his son, he did not fall, had his eyes open but was unresponsive and weak, suffered no head injuries or witnessed seizures. Patient denies palpitations, nausea, sweating, pain in arm or jaw, no fevers, chills or cough, denies sick contacts. He does report 6-7 month history of dyspnea on exertion for which work up was being initiated and was supposed to get PFTs however was postponed due to covid pandemic. Patient is unsure when his last echo was, reports he had a normal angiogram in nov/dec 2019. Currently reports generalized weakness and back pain.   In ER, he was noted to have a BP of 90/58, was given 2l bolus of NS following which it improved, currently with sbp of 117. Patient was also given a dose of calcium gluconate for possibility of CCB toxicity given he is on verapamil at home and was bradycardic to 57 in addition to hypotension. Labs also significant for wbc count of 14.3, lactate 3.2, UA and CXR were negative, he was given 1 dose of Rocephin. His BAL was noted 41 though patient denies alcohol ingestion saying last drink was 1 week ago. Patient is a 64M with Asthma, BPH, CAD s/p stent, COPD, GERD, HTN, HLD, IBS, MI, hypothyroidism, mood disorder and a significant PSHx of colonoscopy, prostatectomy, penile implant, UPPP presents to the ED s/p syncopal episode. Patient reports around 2pm he stood talking to his friends on the street when he suddenly felt a burning pain over left side of chest radiating to the front where he felt a heavy pressure, 10/10 in severity, associated with difficulty breathing and dizziness following which he passed out. According to his son, he did not fall, had his eyes open but was unresponsive and weak, suffered no head injuries or witnessed seizures. Patient denies palpitations, nausea, sweating, pain in arm or jaw, no fevers, chills or cough, denies sick contacts. He does report 6-7 month history of dyspnea on exertion for which work up was being initiated and was supposed to get PFTs however was postponed due to covid pandemic. Patient is unsure when his last echo was, reports he had a normal angiogram in nov/dec 2019. Currently reports generalized weakness and back pain.   In ER, he was noted to have a BP of 90/58, was given 2l bolus of NS following which it improved, currently with sbp of 117. Patient was also given a dose of calcium gluconate for possibility of CCB toxicity given he is on verapamil at home and was bradycardic to 57 in addition to hypotension. Labs also significant for wbc count of 14.3, lactate 3.2, UA and CXR were negative, he was given 1 dose of Rocephin. His BAL was noted 41 though patient denies alcohol ingestion saying last drink was 1 week ago.

## 2020-05-24 NOTE — H&P ADULT - NSICDXPASTMEDICALHX_GEN_ALL_CORE_FT
PAST MEDICAL HISTORY:  Asthma controlled- no RX @ present    BPH (Benign Prostatic Hyperplasia)     CAD (Coronary Atherosclerotic Disease) myocardial infarction- 1988, s/p PCI-RCA stent- 8/6/07    Cardiac abnormality cardiac stent X1 2007    COPD (chronic obstructive pulmonary disease)     Dyslipidemia     Gastroesophageal reflux disease     GERD (Gastroesophageal Reflux Disease)     GI bleed 6/2013 pt stopped ASA    History of prostate cancer     HTN (Hypertension)     Hyperlipidemia     Hypertension     IBS (irritable bowel syndrome)     Impotence sexual     Insomnia     Male urinary stress incontinence     MI (myocardial infarction) 1988 s/p insert stent    Obstructive Sleep Apnea s/p UPP-2008 sleep studies -2008 refuses to use cpap.    Prostate cancer denies chemotherapy and radiation    Sleep apnea     Stress incontinence, male     Urinary incontinence

## 2020-05-24 NOTE — ED ADULT NURSE NOTE - OBJECTIVE STATEMENT
As per family pt fainted while outside fall was broken by son. Denies hitting his head. Pt states feeling dizzy and having a headache before fainting. Pt is more responsive at this time then when he first came in.

## 2020-05-24 NOTE — H&P ADULT - PROBLEM SELECTOR PLAN 9
IMPROVE VTE Individual Risk Assessment  RISK                                                                Points  [  ] Previous VTE                                                  3  [  ] Thrombophilia                                               2  [  ] Lower limb paralysis                                      2        (unable to hold up >15 seconds)    [  ] Current Cancer                                              2         (within 6 months)  [ x ] Immobilization > 24 hrs                                1  [  ] ICU/CCU stay > 24 hours                              1  [ x ] Age > 60                                                      1  IMPROVE VTE Score 2, lovenox for dvt ppx

## 2020-05-24 NOTE — H&P ADULT - NSICDXPASTSURGICALHX_GEN_ALL_CORE_FT
PAST SURGICAL HISTORY:  Penile abnormality insertion of penile prothesis 11/2013    S/P colonoscopy and endoscopy 6/2013    S/P prostatectomy 2/2011    S/P UPPP (Uvulopalatopharyngoplasty) 2008    S/P uvulopalatopharyngoplasty 2009    Stented coronary artery

## 2020-05-24 NOTE — H&P ADULT - PROBLEM SELECTOR PLAN 2
-presented with hypotension to 90s, elevated lactate level to 3.2, leucocytosis to 14  -bp improved to 117/60 following 2l bolus of NS, lactate 2.5  -patient is afebrile, no localizing signs of infection, no fever, chils, dysuria, diarrhea, abdominal pain, cough, skin lesions or sick contacts  -ua noted negative, CXR with no evidence of infectious process  -received 1 dose of rocepin in ER, will hold off further antibiotics  -f/u covid pcr, blood cultures

## 2020-05-24 NOTE — H&P ADULT - PROBLEM SELECTOR PLAN 6
continue remeron, trintellix unavailable in formulary patient with IBS-Constipation type, last BM this AM  continue laxatives

## 2020-05-24 NOTE — H&P ADULT - PROBLEM SELECTOR PLAN 7
IMPROVE VTE Individual Risk Assessment  RISK                                                                Points  [  ] Previous VTE                                                  3  [  ] Thrombophilia                                               2  [  ] Lower limb paralysis                                      2        (unable to hold up >15 seconds)    [  ] Current Cancer                                              2         (within 6 months)  [ x ] Immobilization > 24 hrs                                1  [  ] ICU/CCU stay > 24 hours                              1  [ x ] Age > 60                                                      1  IMPROVE VTE Score 2, lovenox for dvt ppx continue remeron, trintellix unavailable in formulary

## 2020-05-24 NOTE — ED ADULT NURSE REASSESSMENT NOTE - COMFORT CARE
repositioned/warm blanket provided/side rails up/wait time explained
side rails up/wait time explained/warm blanket provided/repositioned/assisted with urinal

## 2020-05-24 NOTE — H&P ADULT - NSICDXFAMILYHX_GEN_ALL_CORE_FT
FAMILY HISTORY:  Sibling  Still living? Unknown  Family history of hypertension, Age at diagnosis: Age Unknown  History of hypertension, Age at diagnosis: Age Unknown

## 2020-05-24 NOTE — ED PROVIDER NOTE - OBJECTIVE STATEMENT
63 y/o M with a significant PMHx of asthma, BPH, CAD s/p stent, COPD, GERD, HTN, HLD, IBS, MI and a significant PSHx of colonoscopy, prostatectomy, UPPP presents to the ED s/p syncopal episode. Patient was walking in the street prior to passing out; details unknown. Upon arrival, patient is complaining of chest discomfort and weakness, being unable to open eyes. Fingerstick 102 and blood pressure 100/55. In the ER, patient is uncooperative and agitated. Skin dry and not clammy.

## 2020-05-24 NOTE — H&P ADULT - NSHPPHYSICALEXAM_GEN_ALL_CORE
Vital Signs Last 24 Hrs  T(C): 36.6 (24 May 2020 21:45), Max: 36.6 (24 May 2020 16:29)  T(F): 97.8 (24 May 2020 21:45), Max: 97.8 (24 May 2020 16:29)  HR: 62 (24 May 2020 21:45) (57 - 63)  BP: 117/60 (24 May 2020 21:45) (90/58 - 117/60)  BP(mean): --  RR: 18 (24 May 2020 21:45) (17 - 18)  SpO2: 99% (24 May 2020 21:45) (95% - 99%)    PHYSICAL EXAM:  GENERAL: NAD, well-developed, obese  HEAD:  Atraumatic, Normocephalic  EYES: EOMI, PERRLA, conjunctiva and sclera clear  NECK: Supple, No JVD  CHEST/LUNG: Clear to auscultation bilaterally; No wheeze  HEART: Regular rate and rhythm; No murmurs, rubs, or gallops  ABDOMEN: Soft, Nontender, Nondistended; Bowel sounds present  EXTREMITIES:, No clubbing, cyanosis, or edema  PSYCH: AAOx3  NEUROLOGY: non-focal  SKIN: No rashes or lesions

## 2020-05-24 NOTE — ED ADULT NURSE NOTE - PSH
Penile abnormality  insertion of penile prothesis 11/2013  S/P colonoscopy  and endoscopy 6/2013  S/P prostatectomy  2/2011  S/P UPPP (Uvulopalatopharyngoplasty)  2008  S/P uvulopalatopharyngoplasty  2009  Stented coronary artery

## 2020-05-24 NOTE — ED ADULT NURSE NOTE - PMH
Asthma  controlled- no RX @ present  BPH (Benign Prostatic Hyperplasia)    CAD (Coronary Atherosclerotic Disease)  myocardial infarction- 1988, s/p PCI-RCA stent- 8/6/07  Cardiac abnormality  cardiac stent X1 2007  COPD (chronic obstructive pulmonary disease)    Dyslipidemia    Gastroesophageal reflux disease    GERD (Gastroesophageal Reflux Disease)    GI bleed  6/2013 pt stopped ASA  History of prostate cancer    HTN (Hypertension)    Hyperlipidemia    Hypertension    IBS (irritable bowel syndrome)    Impotence sexual    Insomnia    Male urinary stress incontinence    MI (myocardial infarction)  1988 s/p insert stent  Obstructive Sleep Apnea  s/p UPP-2008 sleep studies -2008 refuses to use cpap.  Prostate cancer  denies chemotherapy and radiation  Sleep apnea    Stress incontinence, male    Urinary incontinence

## 2020-05-24 NOTE — H&P ADULT - ASSESSMENT
Patient is a 64M with Asthma, BPH, CAD s/p stent, COPD, GERD, HTN, HLD, IBS, MI and a significant PSHx of colonoscopy, prostatectomy, penile implant, UPPP presents to the ED s/p syncopal episode.    In ER, he was noted to have a BP of 90/58, was given 2l bolus of NS following which it improved, currently with sbp of 117. Patient was also given a dose of calcium gluconate for possibility of CCB toxicity given he is on verapamil at home and was bradycardic to 57 in addition to hypotension. EKG noted wit sinus bradycardia to 58bpm, 1st degree AAV block with SC interval 238msec, T wave flattening in limb leads, V3, V4, TWI in V5, V6. Labs also significant for wbc count of 14.3, lactate 3.2, UA and CXR were negative, he was given 1 dose of Rocephin. His BAL was noted 41 though patient denies alcohol ingestion saying last drink was 1 week ago. Patient is a 64M with Asthma, BPH, CAD s/p stent, COPD, GERD, HTN, HLD, IBS, MI, hypothyroidism, mood disorder and a significant PSHx of colonoscopy, prostatectomy, penile implant, UPPP presents to the ED s/p syncopal episode.    In ER, he was noted to have a BP of 90/58, was given 2l bolus of NS following which it improved, currently with sbp of 117. Patient was also given a dose of calcium gluconate for possibility of CCB toxicity given he is on verapamil at home and was bradycardic to 57 in addition to hypotension. EKG noted wit sinus bradycardia to 58bpm, 1st degree AAV block with MI interval 238msec, T wave flattening in limb leads, V3, V4, TWI in V5, V6. Labs also significant for wbc count of 14.3, lactate 3.2, UA and CXR were negative, he was given 1 dose of Rocephin. His BAL was noted 41 though patient denies alcohol ingestion saying last drink was 1 week ago.     **Please confirm home meds with pharmacy

## 2020-05-25 DIAGNOSIS — F39 UNSPECIFIED MOOD [AFFECTIVE] DISORDER: ICD-10-CM

## 2020-05-25 DIAGNOSIS — E03.9 HYPOTHYROIDISM, UNSPECIFIED: ICD-10-CM

## 2020-05-25 DIAGNOSIS — Z86.79 PERSONAL HISTORY OF OTHER DISEASES OF THE CIRCULATORY SYSTEM: ICD-10-CM

## 2020-05-25 LAB
CK MB BLD-MCNC: <2.9 % — SIGNIFICANT CHANGE UP (ref 0–3.5)
CK MB BLD-MCNC: <3.1 % — SIGNIFICANT CHANGE UP (ref 0–3.5)
CK MB CFR SERPL CALC: <1 NG/ML — SIGNIFICANT CHANGE UP (ref 0–3.6)
CK MB CFR SERPL CALC: <1 NG/ML — SIGNIFICANT CHANGE UP (ref 0–3.6)
CK SERPL-CCNC: 32 U/L — LOW (ref 35–232)
CK SERPL-CCNC: 35 U/L — SIGNIFICANT CHANGE UP (ref 35–232)
HCV AB S/CO SERPL IA: 0.08 S/CO — SIGNIFICANT CHANGE UP (ref 0–0.99)
HCV AB SERPL-IMP: SIGNIFICANT CHANGE UP
LACTATE SERPL-SCNC: 1 MMOL/L — SIGNIFICANT CHANGE UP (ref 0.7–2)
SARS-COV-2 RNA SPEC QL NAA+PROBE: SIGNIFICANT CHANGE UP
TROPONIN I SERPL-MCNC: <0.015 NG/ML — SIGNIFICANT CHANGE UP (ref 0–0.04)
TROPONIN I SERPL-MCNC: <0.015 NG/ML — SIGNIFICANT CHANGE UP (ref 0–0.04)

## 2020-05-25 PROCEDURE — 93880 EXTRACRANIAL BILAT STUDY: CPT | Mod: 26

## 2020-05-25 PROCEDURE — 93306 TTE W/DOPPLER COMPLETE: CPT | Mod: 26

## 2020-05-25 RX ORDER — ASPIRIN/CALCIUM CARB/MAGNESIUM 324 MG
81 TABLET ORAL DAILY
Refills: 0 | Status: DISCONTINUED | OUTPATIENT
Start: 2020-05-25 | End: 2020-05-26

## 2020-05-25 RX ORDER — RANOLAZINE 500 MG/1
500 TABLET, FILM COATED, EXTENDED RELEASE ORAL
Refills: 0 | Status: DISCONTINUED | OUTPATIENT
Start: 2020-05-25 | End: 2020-05-26

## 2020-05-25 RX ORDER — MIRTAZAPINE 45 MG/1
15 TABLET, ORALLY DISINTEGRATING ORAL AT BEDTIME
Refills: 0 | Status: DISCONTINUED | OUTPATIENT
Start: 2020-05-25 | End: 2020-05-26

## 2020-05-25 RX ORDER — POLYETHYLENE GLYCOL 3350 17 G/17G
17 POWDER, FOR SOLUTION ORAL DAILY
Refills: 0 | Status: DISCONTINUED | OUTPATIENT
Start: 2020-05-25 | End: 2020-05-26

## 2020-05-25 RX ORDER — DONEPEZIL HYDROCHLORIDE 10 MG/1
5 TABLET, FILM COATED ORAL AT BEDTIME
Refills: 0 | Status: DISCONTINUED | OUTPATIENT
Start: 2020-05-25 | End: 2020-05-26

## 2020-05-25 RX ORDER — ATORVASTATIN CALCIUM 80 MG/1
40 TABLET, FILM COATED ORAL AT BEDTIME
Refills: 0 | Status: DISCONTINUED | OUTPATIENT
Start: 2020-05-25 | End: 2020-05-26

## 2020-05-25 RX ORDER — IPRATROPIUM/ALBUTEROL SULFATE 18-103MCG
3 AEROSOL WITH ADAPTER (GRAM) INHALATION EVERY 6 HOURS
Refills: 0 | Status: DISCONTINUED | OUTPATIENT
Start: 2020-05-25 | End: 2020-05-26

## 2020-05-25 RX ORDER — METOPROLOL TARTRATE 50 MG
12.5 TABLET ORAL ONCE
Refills: 0 | Status: COMPLETED | OUTPATIENT
Start: 2020-05-25 | End: 2020-05-25

## 2020-05-25 RX ORDER — ENOXAPARIN SODIUM 100 MG/ML
40 INJECTION SUBCUTANEOUS DAILY
Refills: 0 | Status: DISCONTINUED | OUTPATIENT
Start: 2020-05-25 | End: 2020-05-26

## 2020-05-25 RX ORDER — LIPASE/PROTEASE/AMYLASE 16-48-48K
1 CAPSULE,DELAYED RELEASE (ENTERIC COATED) ORAL
Refills: 0 | Status: DISCONTINUED | OUTPATIENT
Start: 2020-05-25 | End: 2020-05-25

## 2020-05-25 RX ORDER — LEVOTHYROXINE SODIUM 125 MCG
50 TABLET ORAL DAILY
Refills: 0 | Status: DISCONTINUED | OUTPATIENT
Start: 2020-05-25 | End: 2020-05-26

## 2020-05-25 RX ORDER — ROPINIROLE 8 MG/1
1 TABLET, FILM COATED, EXTENDED RELEASE ORAL DAILY
Refills: 0 | Status: DISCONTINUED | OUTPATIENT
Start: 2020-05-25 | End: 2020-05-26

## 2020-05-25 RX ORDER — PANTOPRAZOLE SODIUM 20 MG/1
40 TABLET, DELAYED RELEASE ORAL
Refills: 0 | Status: DISCONTINUED | OUTPATIENT
Start: 2020-05-25 | End: 2020-05-26

## 2020-05-25 RX ORDER — SENNA PLUS 8.6 MG/1
2 TABLET ORAL AT BEDTIME
Refills: 0 | Status: DISCONTINUED | OUTPATIENT
Start: 2020-05-25 | End: 2020-05-26

## 2020-05-25 RX ORDER — METOPROLOL TARTRATE 50 MG
12.5 TABLET ORAL EVERY 12 HOURS
Refills: 0 | Status: DISCONTINUED | OUTPATIENT
Start: 2020-05-25 | End: 2020-05-26

## 2020-05-25 RX ADMIN — Medication 50 MICROGRAM(S): at 06:22

## 2020-05-25 RX ADMIN — RANOLAZINE 500 MILLIGRAM(S): 500 TABLET, FILM COATED, EXTENDED RELEASE ORAL at 17:19

## 2020-05-25 RX ADMIN — Medication 12.5 MILLIGRAM(S): at 17:16

## 2020-05-25 RX ADMIN — SENNA PLUS 2 TABLET(S): 8.6 TABLET ORAL at 22:01

## 2020-05-25 RX ADMIN — DONEPEZIL HYDROCHLORIDE 5 MILLIGRAM(S): 10 TABLET, FILM COATED ORAL at 22:00

## 2020-05-25 RX ADMIN — ATORVASTATIN CALCIUM 40 MILLIGRAM(S): 80 TABLET, FILM COATED ORAL at 22:00

## 2020-05-25 RX ADMIN — MIRTAZAPINE 15 MILLIGRAM(S): 45 TABLET, ORALLY DISINTEGRATING ORAL at 22:01

## 2020-05-25 RX ADMIN — ROPINIROLE 1 MILLIGRAM(S): 8 TABLET, FILM COATED, EXTENDED RELEASE ORAL at 11:43

## 2020-05-25 RX ADMIN — Medication 12.5 MILLIGRAM(S): at 12:57

## 2020-05-25 RX ADMIN — ENOXAPARIN SODIUM 40 MILLIGRAM(S): 100 INJECTION SUBCUTANEOUS at 11:43

## 2020-05-25 RX ADMIN — PANTOPRAZOLE SODIUM 40 MILLIGRAM(S): 20 TABLET, DELAYED RELEASE ORAL at 06:22

## 2020-05-25 RX ADMIN — Medication 81 MILLIGRAM(S): at 11:43

## 2020-05-25 RX ADMIN — Medication 3 MILLILITER(S): at 06:39

## 2020-05-25 NOTE — CONSULT NOTE ADULT - SUBJECTIVE AND OBJECTIVE BOX
CHIEF COMPLAINT:Patient is a 64y old  Male who presents with a chief complaint of passed out (24 May 2020 22:25)      HPI:  Patient is a 64 yr old M with PMHX of Asthma, BPH, CAD s/p stent , COPD, GERD, HTN, HLD, IBS, MI, hypothyroidism, mood disorder and a significant PSHx of colonoscopy, prostatectomy, penile implant, UPPP presents to the ED s/p syncopal episode. Patient reports around 2pm he stood talking to his friends on the street when he suddenly felt a burning pain over left side of chest radiating to the front where he felt a heavy pressure, 10/10 in severity, associated with difficulty breathing and dizziness following which he passed out. According to his son, he did not fall, had his eyes open but was unresponsive and weak, suffered no head injuries or witnessed seizures. Patient denies palpitations, nausea, sweating, pain in arm or jaw, no fevers, chills or cough, denies sick contacts. He does report 6-7 month history of dyspnea on exertion for which work up was being initiated and was supposed to get PFTs however was postponed due to covid pandemic. Patient is unsure when his last echo was, reports he had a normal angiogram in nov/dec 2019. Currently reports generalized weakness and back pain.   In ER, he was noted to have a BP of 90/58, was given 2l bolus of NS following which it improved, currently with sbp of 117. Patient was also given a dose of calcium gluconate for possibility of CCB toxicity given he is on verapamil at home and was bradycardic to 57 in addition to hypotension. Labs also significant for wbc count of 14.3, lactate 3.2, UA and CXR were negative, he was given 1 dose of Rocephin. His BAL was noted 41 though patient denies alcohol ingestion saying last drink was 1 week ago. (24 May 2020 22:25)      PAST MEDICAL & SURGICAL HISTORY:  Urinary incontinence  IBS (irritable bowel syndrome)  COPD (chronic obstructive pulmonary disease)  Gastroesophageal reflux disease  Hypertension  Hyperlipidemia  Prostate cancer: denies chemotherapy and radiation  Cardiac abnormality: cardiac stent X1   Sleep apnea  Male urinary stress incontinence  BPH (Benign Prostatic Hyperplasia)  MI (myocardial infarction):  s/p insert stent  Stress incontinence, male  Impotence sexual  Insomnia  GI bleed: 2013 pt stopped ASA  History of prostate cancer  Obstructive Sleep Apnea: s/p UPP- sleep studies - refuses to use cpap.  Dyslipidemia  GERD (Gastroesophageal Reflux Disease)  Asthma: controlled- no RX @ present  HTN (Hypertension)  CAD (Coronary Atherosclerotic Disease): myocardial infarction- , s/p PCI-RCA stent- 07  Stented coronary artery  Penile abnormality: insertion of penile prothesis 2013  S/P uvulopalatopharyngoplasty:   S/P prostatectomy: 2011  S/P colonoscopy: and endoscopy 2013  S/P UPPP (Uvulopalatopharyngoplasty):       MEDICATIONS  (STANDING):  albuterol/ipratropium for Nebulization 3 milliLiter(s) Nebulizer every 6 hours  aspirin enteric coated 81 milliGRAM(s) Oral daily  atorvastatin 40 milliGRAM(s) Oral at bedtime  donepezil 5 milliGRAM(s) Oral at bedtime  enoxaparin Injectable 40 milliGRAM(s) SubCutaneous daily  levothyroxine 50 MICROGram(s) Oral daily  mirtazapine 15 milliGRAM(s) Oral at bedtime  pantoprazole    Tablet 40 milliGRAM(s) Oral before breakfast  polyethylene glycol 3350 17 Gram(s) Oral daily  ranolazine 500 milliGRAM(s) Oral two times a day  rOPINIRole 1 milliGRAM(s) Oral daily  senna 2 Tablet(s) Oral at bedtime    MEDICATIONS  (PRN):      FAMILY HISTORY:  History of hypertension (Sibling)  Family history of hypertension (Sibling): father  62y/o of htn, MI, mother hx htn, 1 brother htn      SOCIAL HISTORY:    [x ] Non-smoker    [x ] Alcohol-denies    Allergies    No Known Allergies    Intolerances    	    REVIEW OF SYSTEMS:  CONSTITUTIONAL: No fever, weight loss, or fatigue  EYES: No eye pain, visual disturbances, or discharge  ENT:  No difficulty hearing, tinnitus, vertigo; No sinus or throat pain  NECK: No pain or stiffness  RESPIRATORY: No cough, wheezing, chills or hemoptysis; No Shortness of Breath  CARDIOVASCULAR: No chest pain, palpitations, +passing out,+ dizziness  GASTROINTESTINAL: No abdominal or epigastric pain. No nausea, vomiting, or hematemesis; No diarrhea or constipation. No melena or hematochezia.  GENITOURINARY: No dysuria, frequency, hematuria, or incontinence  NEUROLOGICAL: No headaches, memory loss, loss of strength, numbness, or tremors  SKIN: No itching, burning, rashes, or lesions   LYMPH Nodes: No enlarged glands  ENDOCRINE: No heat or cold intolerance; No hair loss  MUSCULOSKELETAL: No joint pain or swelling; No muscle, back, or extremity pain  PSYCHIATRIC: No depression, anxiety, mood swings, or difficulty sleeping  HEME/LYMPH: No easy bruising, or bleeding gums  ALLERGY AND IMMUNOLOGIC: No hives or eczema	        PHYSICAL EXAM:  T(C): 36.3 (20 @ 11:04), Max: 36.6 (20 @ 16:29)  HR: 80 (20 @ 11:04) (57 - 80)  BP: 171/94 (20 @ 11:04) (90/58 - 171/94)  RR: 18 (20 @ 11:04) (17 - 20)  SpO2: 97% (20 @ 11:04) (95% - 100%)  Wt(kg): --  I&O's Summary      Appearance: Normal	  HEENT:   Normal oral mucosa, PERRL, EOMI	  Lymphatic: No lymphadenopathy  Cardiovascular: Normal S1 S2, No JVD, No murmurs, No edema  Respiratory: Lungs clear to auscultation	  Psychiatry: A & O x 3, Mood & affect appropriate  Gastrointestinal:  Soft, Non-tender, + BS	  Skin: No rashes, No ecchymoses, No cyanosis	  Neurologic: Non-focal  Extremities: Normal range of motion, No clubbing, cyanosis or edema  Vascular: Peripheral pulses palpable 2+ bilaterally    	    ECG: Sinus bradycardia with sinus arrhythmia with 1st degree A-V block  T wave abnormality, consider lateral ischemia   	  	  	  LABS:	 	      CARDIAC MARKERS ( 25 May 2020 06:18 )  <0.015 ng/mL / x     / 32 U/L / x     / <1.0 ng/mL  CARDIAC MARKERS ( 25 May 2020 00:05 )  <0.015 ng/mL / x     / 35 U/L / x     / <1.0 ng/mL  CARDIAC MARKERS ( 24 May 2020 17:45 )  <0.015 ng/mL / x     / x     / x     / x                                  15.9   14.30 )-----------( 210      ( 24 May 2020 17:45 )             48.8     05-24    142  |  111<H>  |  22<H>  ----------------------------<  98  3.9   |  22  |  1.22    Ca    9.2      24 May 2020 17:45    TPro  6.7  /  Alb  3.5  /  TBili  0.2  /  DBili  x   /  AST  15  /  ALT  34  /  AlkPhos  85      COVID-19 PCR: NotDetec: This test has been validated by [employer] to be accurate;  though it has not been FDA cleared/approved by the usual pathway.  As with all laboratory tests, results should be correlated with clinical  findings.  https://www.fda.gov/media/600707/download  https://www.fda.gov/media/576186/download (20 @ 20:56)    EXAM:  US DPLX CAROTIDS COMPL BI                            PROCEDURE DATE:  2020          INTERPRETATION:  Carotid duplex Doppler ultrasound    Indication: syncope    Comparison: None    Carotid duplex Doppler ultrasound is performed. Grayscale ultrasound demonstrates small atherosclerotic plaque in the right carotid bulb and proximal right internal carotid artery.. The flow velocity measurements during peak systolic phase in centimeters per second are as the following:    Right CCA 90, ICA 85, ECA 86.  Left CCA 94, ICA 72, ECA 88.    The end diastolic velocity measurement for the right ICA is 29, and  for the left ICA is 35.    The peak systolic ICA/CCA velocity ratio on the right is 0.9, and on the left is 0.8.    Both vertebral arteries maintain normal antegrade flow direction.    Impression: No hemodynamically significant internal carotid artery stenosis by velocity criteria.      EXAM:  XR CHEST PORTABLE IMMED 1V                            PROCEDURE DATE:  2020          INTERPRETATION:  Portable chest x-ray    Indication: syncope    Comparison: 3/23/2019    2 portable chest x-rays are acquired for evaluation.    Impression: No evidence for acute pulmonary infiltrate, pleural effusion, or pneumothorax.    New small right basilar atelectasis.    Stable cardiac silhouette.     No pulmonary vascular congestion.

## 2020-05-25 NOTE — CONSULT NOTE ADULT - ASSESSMENT
64 yr old M with PMHX of Asthma, BPH, CAD s/p stent 2007, COPD, GERD, HTN, HLD, IBS, MI, hypothyroidism, mood disorder and a significant PSHx of colonoscopy, prostatectomy, penile implant, UPPP presents to the ED s/p syncopal episode.  1.Tele monitoring.  2.Orthostatic bp q shift.  3.Neurology eval.  4.CAD-asa, low doseb blocker,ranexa, statin. Pt reports cath end of 2019 at [facility],please obtain report.  5.Hypothyroidism-synthroid.  6.COPD-nebs.  7.GI and DVT prophylaxis.

## 2020-05-26 ENCOUNTER — TRANSCRIPTION ENCOUNTER (OUTPATIENT)
Age: 65
End: 2020-05-26

## 2020-05-26 VITALS
HEART RATE: 62 BPM | OXYGEN SATURATION: 97 % | DIASTOLIC BLOOD PRESSURE: 87 MMHG | TEMPERATURE: 97 F | RESPIRATION RATE: 19 BRPM | SYSTOLIC BLOOD PRESSURE: 172 MMHG

## 2020-05-26 LAB
24R-OH-CALCIDIOL SERPL-MCNC: 46.2 NG/ML — SIGNIFICANT CHANGE UP (ref 30–80)
A1C WITH ESTIMATED AVERAGE GLUCOSE RESULT: 5.2 % — SIGNIFICANT CHANGE UP (ref 4–5.6)
ESTIMATED AVERAGE GLUCOSE: 103 MG/DL — SIGNIFICANT CHANGE UP (ref 68–114)
VIT B12 SERPL-MCNC: 444 PG/ML — SIGNIFICANT CHANGE UP (ref 232–1245)

## 2020-05-26 PROCEDURE — 96374 THER/PROPH/DIAG INJ IV PUSH: CPT

## 2020-05-26 PROCEDURE — 93880 EXTRACRANIAL BILAT STUDY: CPT

## 2020-05-26 PROCEDURE — 85027 COMPLETE CBC AUTOMATED: CPT

## 2020-05-26 PROCEDURE — 82962 GLUCOSE BLOOD TEST: CPT

## 2020-05-26 PROCEDURE — 81001 URINALYSIS AUTO W/SCOPE: CPT

## 2020-05-26 PROCEDURE — 94640 AIRWAY INHALATION TREATMENT: CPT

## 2020-05-26 PROCEDURE — 93005 ELECTROCARDIOGRAM TRACING: CPT

## 2020-05-26 PROCEDURE — 85379 FIBRIN DEGRADATION QUANT: CPT

## 2020-05-26 PROCEDURE — 83690 ASSAY OF LIPASE: CPT

## 2020-05-26 PROCEDURE — 83036 HEMOGLOBIN GLYCOSYLATED A1C: CPT

## 2020-05-26 PROCEDURE — 36415 COLL VENOUS BLD VENIPUNCTURE: CPT

## 2020-05-26 PROCEDURE — 86803 HEPATITIS C AB TEST: CPT

## 2020-05-26 PROCEDURE — 82550 ASSAY OF CK (CPK): CPT

## 2020-05-26 PROCEDURE — 83605 ASSAY OF LACTIC ACID: CPT

## 2020-05-26 PROCEDURE — 99285 EMERGENCY DEPT VISIT HI MDM: CPT

## 2020-05-26 PROCEDURE — 87040 BLOOD CULTURE FOR BACTERIA: CPT

## 2020-05-26 PROCEDURE — 82607 VITAMIN B-12: CPT

## 2020-05-26 PROCEDURE — 85730 THROMBOPLASTIN TIME PARTIAL: CPT

## 2020-05-26 PROCEDURE — 80053 COMPREHEN METABOLIC PANEL: CPT

## 2020-05-26 PROCEDURE — 71045 X-RAY EXAM CHEST 1 VIEW: CPT

## 2020-05-26 PROCEDURE — 85610 PROTHROMBIN TIME: CPT

## 2020-05-26 PROCEDURE — 82553 CREATINE MB FRACTION: CPT

## 2020-05-26 PROCEDURE — 84484 ASSAY OF TROPONIN QUANT: CPT

## 2020-05-26 PROCEDURE — 93306 TTE W/DOPPLER COMPLETE: CPT

## 2020-05-26 PROCEDURE — 80307 DRUG TEST PRSMV CHEM ANLYZR: CPT

## 2020-05-26 PROCEDURE — 82306 VITAMIN D 25 HYDROXY: CPT

## 2020-05-26 PROCEDURE — U0003: CPT

## 2020-05-26 RX ORDER — ATORVASTATIN CALCIUM 80 MG/1
1 TABLET, FILM COATED ORAL
Qty: 30 | Refills: 0
Start: 2020-05-26 | End: 2020-06-24

## 2020-05-26 RX ORDER — LUBIPROSTONE 24 UG/1
1 CAPSULE, GELATIN COATED ORAL
Qty: 0 | Refills: 0 | DISCHARGE

## 2020-05-26 RX ORDER — VERAPAMIL HCL 240 MG
120 CAPSULE, EXTENDED RELEASE PELLETS 24 HR ORAL EVERY 12 HOURS
Refills: 0 | Status: DISCONTINUED | OUTPATIENT
Start: 2020-05-26 | End: 2020-05-26

## 2020-05-26 RX ORDER — EVOLOCUMAB 140 MG/ML
0 INJECTION, SOLUTION SUBCUTANEOUS
Qty: 0 | Refills: 0 | DISCHARGE

## 2020-05-26 RX ORDER — VERAPAMIL HCL 240 MG
1 CAPSULE, EXTENDED RELEASE PELLETS 24 HR ORAL
Qty: 0 | Refills: 0 | DISCHARGE

## 2020-05-26 RX ORDER — LEVOTHYROXINE SODIUM 125 MCG
1 TABLET ORAL
Qty: 0 | Refills: 0 | DISCHARGE

## 2020-05-26 RX ORDER — METOPROLOL TARTRATE 50 MG
1 TABLET ORAL
Qty: 0 | Refills: 0 | DISCHARGE
Start: 2020-05-26

## 2020-05-26 RX ORDER — VERAPAMIL HCL 240 MG
120 CAPSULE, EXTENDED RELEASE PELLETS 24 HR ORAL
Refills: 0 | Status: DISCONTINUED | OUTPATIENT
Start: 2020-05-26 | End: 2020-05-26

## 2020-05-26 RX ORDER — PLECANATIDE 3 MG/1
1 TABLET ORAL
Qty: 0 | Refills: 0 | DISCHARGE

## 2020-05-26 RX ORDER — LIPASE/PROTEASE/AMYLASE 16-48-48K
1 CAPSULE,DELAYED RELEASE (ENTERIC COATED) ORAL
Qty: 0 | Refills: 0 | DISCHARGE

## 2020-05-26 RX ORDER — DONEPEZIL HYDROCHLORIDE 10 MG/1
1 TABLET, FILM COATED ORAL
Qty: 0 | Refills: 0 | DISCHARGE

## 2020-05-26 RX ORDER — ERGOCALCIFEROL 1.25 MG/1
1 CAPSULE ORAL
Qty: 0 | Refills: 0 | DISCHARGE

## 2020-05-26 RX ORDER — LOSARTAN POTASSIUM 100 MG/1
1 TABLET, FILM COATED ORAL
Qty: 30 | Refills: 0
Start: 2020-05-26 | End: 2020-06-24

## 2020-05-26 RX ORDER — DEXLANSOPRAZOLE 30 MG/1
1 CAPSULE, DELAYED RELEASE ORAL
Qty: 0 | Refills: 0 | DISCHARGE

## 2020-05-26 RX ORDER — CHLORTHALIDONE 50 MG
1 TABLET ORAL
Qty: 0 | Refills: 0 | DISCHARGE

## 2020-05-26 RX ORDER — METOPROLOL TARTRATE 50 MG
25 TABLET ORAL DAILY
Refills: 0 | Status: DISCONTINUED | OUTPATIENT
Start: 2020-05-26 | End: 2020-05-26

## 2020-05-26 RX ORDER — POLYETHYLENE GLYCOL 3350 17 G/17G
0 POWDER, FOR SOLUTION ORAL
Qty: 0 | Refills: 0 | DISCHARGE

## 2020-05-26 RX ORDER — LOSARTAN POTASSIUM 100 MG/1
25 TABLET, FILM COATED ORAL DAILY
Refills: 0 | Status: DISCONTINUED | OUTPATIENT
Start: 2020-05-26 | End: 2020-05-26

## 2020-05-26 RX ADMIN — ROPINIROLE 1 MILLIGRAM(S): 8 TABLET, FILM COATED, EXTENDED RELEASE ORAL at 11:25

## 2020-05-26 RX ADMIN — RANOLAZINE 500 MILLIGRAM(S): 500 TABLET, FILM COATED, EXTENDED RELEASE ORAL at 06:10

## 2020-05-26 RX ADMIN — Medication 50 MICROGRAM(S): at 06:09

## 2020-05-26 RX ADMIN — Medication 12.5 MILLIGRAM(S): at 06:09

## 2020-05-26 RX ADMIN — LOSARTAN POTASSIUM 25 MILLIGRAM(S): 100 TABLET, FILM COATED ORAL at 11:38

## 2020-05-26 RX ADMIN — ENOXAPARIN SODIUM 40 MILLIGRAM(S): 100 INJECTION SUBCUTANEOUS at 11:25

## 2020-05-26 RX ADMIN — Medication 25 MILLIGRAM(S): at 12:00

## 2020-05-26 RX ADMIN — POLYETHYLENE GLYCOL 3350 17 GRAM(S): 17 POWDER, FOR SOLUTION ORAL at 11:25

## 2020-05-26 RX ADMIN — Medication 81 MILLIGRAM(S): at 11:25

## 2020-05-26 RX ADMIN — PANTOPRAZOLE SODIUM 40 MILLIGRAM(S): 20 TABLET, DELAYED RELEASE ORAL at 06:09

## 2020-05-26 NOTE — DISCHARGE NOTE PROVIDER - NSDCCPCAREPLAN_GEN_ALL_CORE_FT
PRINCIPAL DISCHARGE DIAGNOSIS  Diagnosis: Syncope, unspecified syncope type  Assessment and Plan of Treatment: You were admitted due to the concerns of syncopy episode. You had a detailed work up for the syncopy episode. Your blood pressure was noted to be 90/70 on admissin and normal saline iv bolus were given. Low blood pressure likely due to oanh eAnti HTN medications. EKG was performed that was suggestive of 1st dgree AV block and bradycardia.  Home Bp medications were held. Echo cradiogram was performed that  was suggestive of normal cardiac functioning with ejection fraction > 70%. Carotid doppler study was also performed that showed no evidence of any stenosis. You were managed here with toprol xl 25mg once daily and cozaar 25mg once daily. Orthstatics study was negative as well. You are recommended to follow up with your Cardiologist OP.   You  had aknown history of coronary artery disease. You were managed here with aspirin and statin. Lipid study within normal range.   You had known history of Asthma, no episodes of nay exacerbration.  You ahd known hypothyroidism. Managed here with home dose of levothyroxin e50mcg.      SECONDARY DISCHARGE DIAGNOSES  Diagnosis: Asthma  Assessment and Plan of Treatment: You had known history of Asthma, no episodes of nay exacerbration. Please continue with your home medication.    Diagnosis: IBS (irritable bowel syndrome)  Assessment and Plan of Treatment: You ahd a known history of IBS (irritable bowel syndrome).Managed here with laxatives.    Diagnosis: CAD (Coronary Atherosclerotic Disease)  Assessment and Plan of Treatment: You  had aknown history of coronary artery disease. You were managed here with aspirin and statin. Lipid study within normal range.    Diagnosis: Mood disorder  Assessment and Plan of Treatment: You had known Mood disorder. Managed here with Ronalazine.mirtazepine, Ropinirle.    Diagnosis: History of hypertension  Assessment and Plan of Treatment: Your blood pressure was noted to be 90/70 on admissin and normal saline iv bolus were given. Low blood pressure likely due to onah eAnti HTN medications. EKG was performed that was suggestive of 1st dgree AV block and bradycardia.  Home Bp medications were held. Started on Toprol xl 25mg once daily and cozaar 25mg once dailly.    Diagnosis: Hypothyroidism  Assessment and Plan of Treatment: You ahd known hypothyroidism. Managed here with home dose of levothyroxin e50mcg

## 2020-05-26 NOTE — DISCHARGE NOTE NURSING/CASE MANAGEMENT/SOCIAL WORK - PATIENT PORTAL LINK FT
You can access the FollowMyHealth Patient Portal offered by [facility] by registering at the following website: [email or web address]. By joining [employer]’s FollowMyHealth portal, you will also be able to view your health information using other applications (apps) compatible with our system.

## 2020-05-26 NOTE — PROGRESS NOTE ADULT - SUBJECTIVE AND OBJECTIVE BOX
CHIEF COMPLAINT:Patient is a 64y old  Male who presents with a chief complaint of passed out.Pt appears comfortable.    	  REVIEW OF SYSTEMS:  CONSTITUTIONAL: No fever, weight loss, or fatigue  EYES: No eye pain, visual disturbances, or discharge  ENT:  No difficulty hearing, tinnitus, vertigo; No sinus or throat pain  NECK: No pain or stiffness  RESPIRATORY: No cough, wheezing, chills or hemoptysis; No Shortness of Breath  CARDIOVASCULAR: No chest pain, palpitations, passing out, dizziness, or leg swelling  GASTROINTESTINAL: No abdominal or epigastric pain. No nausea, vomiting, or hematemesis; No diarrhea or constipation. No melena or hematochezia.  GENITOURINARY: No dysuria, frequency, hematuria, or incontinence  NEUROLOGICAL: No headaches, memory loss, loss of strength, numbness, or tremors  SKIN: No itching, burning, rashes, or lesions   LYMPH Nodes: No enlarged glands  ENDOCRINE: No heat or cold intolerance; No hair loss  MUSCULOSKELETAL: No joint pain or swelling; No muscle, back, or extremity pain  PSYCHIATRIC: No depression, anxiety, mood swings, or difficulty sleeping  HEME/LYMPH: No easy bruising, or bleeding gums  ALLERGY AND IMMUNOLOGIC: No hives or eczema	        PHYSICAL EXAM:  T(C): 36.3 (05-26-20 @ 07:48), Max: 36.7 (05-25-20 @ 14:30)  HR: 62 (05-26-20 @ 07:48) (54 - 80)  BP: 172/87 (05-26-20 @ 07:48) (142/94 - 172/87)  RR: 19 (05-26-20 @ 07:48) (18 - 19)  SpO2: 97% (05-26-20 @ 07:48) (96% - 99%)  Wt(kg): --  I&O's Summary      Appearance: Normal	  HEENT:   Normal oral mucosa, PERRL, EOMI	  Lymphatic: No lymphadenopathy  Cardiovascular: Normal S1 S2, No JVD, No murmurs, No edema  Respiratory: Lungs clear to auscultation	  Psychiatry: A & O x 3, Mood & affect appropriate  Gastrointestinal:  Soft, Non-tender, + BS	  Skin: No rashes, No ecchymoses, No cyanosis	  Neurologic: Non-focal  Extremities: Normal range of motion, No clubbing, cyanosis or edema  Vascular: Peripheral pulses palpable 2+ bilaterally    MEDICATIONS  (STANDING):  albuterol/ipratropium for Nebulization 3 milliLiter(s) Nebulizer every 6 hours  aspirin enteric coated 81 milliGRAM(s) Oral daily  atorvastatin 40 milliGRAM(s) Oral at bedtime  donepezil 5 milliGRAM(s) Oral at bedtime  enoxaparin Injectable 40 milliGRAM(s) SubCutaneous daily  levothyroxine 50 MICROGram(s) Oral daily  metoprolol tartrate 12.5 milliGRAM(s) Oral every 12 hours  mirtazapine 15 milliGRAM(s) Oral at bedtime  pantoprazole    Tablet 40 milliGRAM(s) Oral before breakfast  polyethylene glycol 3350 17 Gram(s) Oral daily  ranolazine 500 milliGRAM(s) Oral two times a day  rOPINIRole 1 milliGRAM(s) Oral daily  senna 2 Tablet(s) Oral at bedtime  verapamil  milliGRAM(s) Oral <User Schedule>      TELEMETRY: sr 49-60's	      	  LABS:	 	  CARDIAC MARKERS ( 25 May 2020 06:18 )  <0.015 ng/mL / x     / 32 U/L / x     / <1.0 ng/mL  CARDIAC MARKERS ( 25 May 2020 00:05 )  <0.015 ng/mL / x     / 35 U/L / x     / <1.0 ng/mL  CARDIAC MARKERS ( 24 May 2020 17:45 )  <0.015 ng/mL / x     / x     / x     / x                                    15.9   14.30 )-----------( 210      ( 24 May 2020 17:45 )             48.8     05-24    142  |  111<H>  |  22<H>  ----------------------------<  98  3.9   |  22  |  1.22    Ca    9.2      24 May 2020 17:45    TPro  6.7  /  Alb  3.5  /  TBili  0.2  /  DBili  x   /  AST  15  /  ALT  34  /  AlkPhos  85  05-24    A1C with Estimated Average Glucose Result: 5.2: Method: Immunoassay       Reference Range                4.0-5.6%       High risk (prediabetic)        5.7-6.4%       Diabetic, diagnostic             >=6.5%       ADA diabetic treatment goal       <7.0%  The Hemoglobin A1c testing is NGSP-certified.Reference ranges are based  upon the 2010 recommendations of  the American Diabetes Association.  Interpretation may vary for children  and adolescents. % (05.26.20 @ 09:26)

## 2020-05-26 NOTE — DISCHARGE NOTE PROVIDER - NSDCMRMEDTOKEN_GEN_ALL_CORE_FT
Advair Diskus 100 mcg-50 mcg inhalation powder: 1 puff(s) inhaled 2 times a day  Amitiza 8 mcg oral capsule: 1 cap(s) orally 2 times a day  Anoro Ellipta 62.5 mcg-25 mcg/inh inhalation powder: 1 puff(s) inhaled once a day  aspirin 81 mg oral tablet: 1 tab(s) orally once a day  bisacodyl 5 mg oral delayed release tablet: 1 tab(s) orally once a day  chlorthalidone 25 mg oral tablet: 1 tab(s) orally once a day  Creon 36,000 units oral delayed release capsule: 1 cap(s) orally 3 times a day  Dexilant 60 mg oral delayed release capsule: 1 cap(s) orally once a day  donepezil 5 mg oral tablet: 1 tab(s) orally once a day (at bedtime)  levothyroxine 50 mcg (0.05 mg) oral capsule: 1 cap(s) orally once a day  mirtazapine 15 mg oral tablet: 1 tab(s) orally once a day (at bedtime)  polyethylene glycol 3350 oral powder for reconstitution:   Ranexa 500 mg oral tablet, extended release: 1 tab(s) orally 2 times a day  Repatha 140 mg/mL subcutaneous solution:   rOPINIRole 1 mg oral tablet: 1 tab(s) orally 3 times a day  Trintellix 10 mg oral tablet: 1 tab(s) orally once a day  Trulance 3 mg oral tablet: 1 tab(s) orally once a day  verapamil 120 mg/12 hours oral tablet, extended release: 1 tab(s) orally once a day  Vitamin D2 50,000 intl units (1.25 mg) oral capsule: 1 cap(s) orally once a week Advair Diskus 100 mcg-50 mcg inhalation powder: 1 puff(s) inhaled 2 times a day  Anoro Ellipta 62.5 mcg-25 mcg/inh inhalation powder: 1 puff(s) inhaled once a day  aspirin 81 mg oral tablet: 1 tab(s) orally once a day  atorvastatin 40 mg oral tablet: 1 tab(s) orally once a day (at bedtime)  losartan 25 mg oral tablet: 1 tab(s) orally once a day  metoprolol succinate 25 mg oral tablet, extended release: 1 tab(s) orally once a day  mirtazapine 15 mg oral tablet: 1 tab(s) orally once a day (at bedtime)  Ranexa 500 mg oral tablet, extended release: 1 tab(s) orally 2 times a day  rOPINIRole 1 mg oral tablet: 1 tab(s) orally 3 times a day  Trintellix 10 mg oral tablet: 1 tab(s) orally once a day

## 2020-05-26 NOTE — PROGRESS NOTE ADULT - ASSESSMENT
64 yr old M with PMHX of Asthma, BPH, CAD s/p stent 2007, COPD, GERD, HTN, HLD, IBS, MI, hypothyroidism, mood disorder and a significant PSHx of colonoscopy, prostatectomy, penile implant, UPPP presents to the ED s/p syncopal episode.  1.Tele monitoring.  2.Orthostatic bp q shift.  3.Neurology eval.  4.CAD-asa, low doseb blocker,ranexa, statin. Pt reports cath end of 2019 at [facility],please obtain report.  5.Hypothyroidism-synthroid.  6.COPD-nebs.  7.GI and DVT prophylaxis. 64 yr old M with PMHX of Asthma, BPH, CAD s/p stent 2007, COPD, GERD, HTN, HLD, IBS, MI, hypothyroidism, mood disorder and a significant PSHx of colonoscopy, prostatectomy, penile implant, UPPP presents to the ED s/p syncopal episode.  1.Tele monitoring.  2.Orthostatic bp q shift.  3.Neurology eval.  4.CAD-asa, low doseb blocker,ranexa, statin. Pt reports cath end of 2019 at [facility],please obtain report.  5.Hypothyroidism-synthroid.  6.COPD-nebs.  7.HTN-add cozaar 25mg qd.  8.GI and DVT prophylaxis.

## 2020-05-26 NOTE — DISCHARGE NOTE PROVIDER - HOSPITAL COURSE
HPI:    Patient is a 64M with Asthma, BPH, CAD s/p stent, COPD, GERD, HTN, HLD, IBS, MI, hypothyroidism, mood disorder and a significant PSHx of colonoscopy, prostatectomy, penile implant, UPPP presents to the ED s/p syncopal episode. Patient reports around 2pm he stood talking to his friends on the street when he suddenly felt a burning pain over left side of chest radiating to the front where he felt a heavy pressure, 10/10 in severity, associated with difficulty breathing and dizziness following which he passed out. According to his son, he did not fall, had his eyes open but was unresponsive and weak, suffered no head injuries or witnessed seizures. Patient denies palpitations, nausea, sweating, pain in arm or jaw, no fevers, chills or cough, denies sick contacts. He does report 6-7 month history of dyspnea on exertion for which work up was being initiated and was supposed to get PFTs however was postponed due to covid pandemic. Patient is unsure when his last echo was, reports he had a normal angiogram in nov/dec 2019. Currently reports generalized weakness and back pain.     In ER, he was noted to have a BP of 90/58, was given 2l bolus of NS following which it improved, currently with sbp of 117. Patient was also given a dose of calcium gluconate for possibility of CCB toxicity given he is on verapamil at home and was bradycardic to 57 in addition to hypotension. Labs also significant for wbc count of 14.3, lactate 3.2, UA and CXR were negative, he was given 1 dose of Rocephin. His BAL was noted 41 though patient denies alcohol ingestion saying last drink was 1 week ago. (24 May 2020 22:25)    Pt had a detailed work up for the syncopy episode. Pt blood pressure was noted to be 90/70 on admissin and normal saline iv bolus were given. HPI:    Patient is a 64M with Asthma, BPH, CAD s/p stent, COPD, GERD, HTN, HLD, IBS, MI, hypothyroidism, mood disorder and a significant PSHx of colonoscopy, prostatectomy, penile implant, UPPP presents to the ED s/p syncopal episode. Patient reports around 2pm he stood talking to his friends on the street when he suddenly felt a burning pain over left side of chest radiating to the front where he felt a heavy pressure, 10/10 in severity, associated with difficulty breathing and dizziness following which he passed out. According to his son, he did not fall, had his eyes open but was unresponsive and weak, suffered no head injuries or witnessed seizures. Patient denies palpitations, nausea, sweating, pain in arm or jaw, no fevers, chills or cough, denies sick contacts. He does report 6-7 month history of dyspnea on exertion for which work up was being initiated and was supposed to get PFTs however was postponed due to covid pandemic. Patient is unsure when his last echo was, reports he had a normal angiogram in nov/dec 2019. Currently reports generalized weakness and back pain.     In ER, he was noted to have a BP of 90/58, was given 2l bolus of NS following which it improved, currently with sbp of 117. Patient was also given a dose of calcium gluconate for possibility of CCB toxicity given he is on verapamil at home and was bradycardic to 57 in addition to hypotension. Labs also significant for wbc count of 14.3, lactate 3.2, UA and CXR were negative, he was given 1 dose of Rocephin. His BAL was noted 41 though patient denies alcohol ingestion saying last drink was 1 week ago. (24 May 2020 22:25)

## 2021-01-04 ENCOUNTER — TRANSCRIPTION ENCOUNTER (OUTPATIENT)
Age: 66
End: 2021-01-04

## 2021-04-15 NOTE — ASU PREOP CHECKLIST - SURGICAL CONSENT
April 15, 2021     Patient: Kaylene Lu   YOB: 1976   Date of Visit: 4/15/2021       To Whom it May Concern:    Kaylene Lu was seen in my clinic on 4/15/2021 at 1:00 pm.    She may return to work 4/16/2021 without restrictions  I have received and reviewed her NEGATIVE COVID 19 test  Sincerely,         Mady Bailey MD    Medical information is confidential and cannot be disclosed without the written consent of the patient or her representative.       done

## 2021-04-21 NOTE — ED ADULT NURSE NOTE - CAS TRG GENERAL NORM CIRC DET
Writer spoke with Dr Reyes's office and he is currently out on medical leave.  They are planning for him to come back in June, but his first new patient appointment is not until July 2021.  Is patient ok to wait that long?  If so, referral can be faxed to [phone].  If not, they are recommending Dr Kinsey Adam in [place] (phone [phone]) or Dr Lori Koroma in [facility] (phone [phone]).   Strong peripheral pulses

## 2022-02-10 NOTE — ED PROVIDER NOTE - NS ED MD DISPO DIVISION
[facility]

## 2022-03-02 ENCOUNTER — TRANSCRIPTION ENCOUNTER (OUTPATIENT)
Age: 67
End: 2022-03-02

## 2022-07-14 ENCOUNTER — NON-APPOINTMENT (OUTPATIENT)
Age: 67
End: 2022-07-14

## 2022-11-21 NOTE — ED PROVIDER NOTE - CHIEF COMPLAINT
The patient is a 63y Male complaining of difficulty breathing. Pt also on COWS protocol/CIWA protocol initiation less than 96 hours/Other

## 2023-08-06 ENCOUNTER — EMERGENCY (EMERGENCY)
Facility: HOSPITAL | Age: 68
LOS: 1 days | Discharge: HOME | End: 2023-08-06
Attending: STUDENT IN AN ORGANIZED HEALTH CARE EDUCATION/TRAINING PROGRAM
Payer: MEDICARE

## 2023-08-06 VITALS
RESPIRATION RATE: 18 BRPM | TEMPERATURE: 98 F | HEART RATE: 64 BPM | SYSTOLIC BLOOD PRESSURE: 141 MMHG | DIASTOLIC BLOOD PRESSURE: 88 MMHG | OXYGEN SATURATION: 98 %

## 2023-08-06 VITALS
RESPIRATION RATE: 20 BRPM | HEIGHT: 65 IN | DIASTOLIC BLOOD PRESSURE: 86 MMHG | WEIGHT: 199.96 LBS | SYSTOLIC BLOOD PRESSURE: 154 MMHG | TEMPERATURE: 98 F | OXYGEN SATURATION: 98 % | HEART RATE: 65 BPM

## 2023-08-06 DIAGNOSIS — Z95.5 PRESENCE OF CORONARY ANGIOPLASTY IMPLANT AND GRAFT: Chronic | ICD-10-CM

## 2023-08-06 LAB
ALBUMIN SERPL ELPH-MCNC: 4.3 G/DL — SIGNIFICANT CHANGE UP (ref 3.3–5)
ALP SERPL-CCNC: 81 U/L — SIGNIFICANT CHANGE UP (ref 40–120)
ALT FLD-CCNC: 20 U/L — SIGNIFICANT CHANGE UP (ref 10–45)
ANION GAP SERPL CALC-SCNC: 12 MMOL/L — SIGNIFICANT CHANGE UP (ref 5–17)
AST SERPL-CCNC: 21 U/L — SIGNIFICANT CHANGE UP (ref 10–40)
BASOPHILS # BLD AUTO: 0.06 K/UL — SIGNIFICANT CHANGE UP (ref 0–0.2)
BASOPHILS NFR BLD AUTO: 0.7 % — SIGNIFICANT CHANGE UP (ref 0–2)
BILIRUB SERPL-MCNC: 0.2 MG/DL — SIGNIFICANT CHANGE UP (ref 0.2–1.2)
BUN SERPL-MCNC: 21 MG/DL — SIGNIFICANT CHANGE UP (ref 7–23)
CALCIUM SERPL-MCNC: 10.6 MG/DL — HIGH (ref 8.4–10.5)
CHLORIDE SERPL-SCNC: 101 MMOL/L — SIGNIFICANT CHANGE UP (ref 96–108)
CO2 SERPL-SCNC: 27 MMOL/L — SIGNIFICANT CHANGE UP (ref 22–31)
CREAT SERPL-MCNC: 1.28 MG/DL — SIGNIFICANT CHANGE UP (ref 0.5–1.3)
EGFR: 60 ML/MIN/1.73M2 — SIGNIFICANT CHANGE UP
EOSINOPHIL # BLD AUTO: 0.3 K/UL — SIGNIFICANT CHANGE UP (ref 0–0.5)
EOSINOPHIL NFR BLD AUTO: 3.4 % — SIGNIFICANT CHANGE UP (ref 0–6)
GLUCOSE SERPL-MCNC: 98 MG/DL — SIGNIFICANT CHANGE UP (ref 70–99)
HCT VFR BLD CALC: 46.1 % — SIGNIFICANT CHANGE UP (ref 39–50)
HGB BLD-MCNC: 15.7 G/DL — SIGNIFICANT CHANGE UP (ref 13–17)
IMM GRANULOCYTES NFR BLD AUTO: 0.5 % — SIGNIFICANT CHANGE UP (ref 0–0.9)
LIDOCAIN IGE QN: 22 U/L — SIGNIFICANT CHANGE UP (ref 7–60)
LYMPHOCYTES # BLD AUTO: 1.94 K/UL — SIGNIFICANT CHANGE UP (ref 1–3.3)
LYMPHOCYTES # BLD AUTO: 22.1 % — SIGNIFICANT CHANGE UP (ref 13–44)
MAGNESIUM SERPL-MCNC: 2.1 MG/DL — SIGNIFICANT CHANGE UP (ref 1.6–2.6)
MCHC RBC-ENTMCNC: 30.8 PG — SIGNIFICANT CHANGE UP (ref 27–34)
MCHC RBC-ENTMCNC: 34.1 GM/DL — SIGNIFICANT CHANGE UP (ref 32–36)
MCV RBC AUTO: 90.6 FL — SIGNIFICANT CHANGE UP (ref 80–100)
MONOCYTES # BLD AUTO: 0.8 K/UL — SIGNIFICANT CHANGE UP (ref 0–0.9)
MONOCYTES NFR BLD AUTO: 9.1 % — SIGNIFICANT CHANGE UP (ref 2–14)
NEUTROPHILS # BLD AUTO: 5.62 K/UL — SIGNIFICANT CHANGE UP (ref 1.8–7.4)
NEUTROPHILS NFR BLD AUTO: 64.2 % — SIGNIFICANT CHANGE UP (ref 43–77)
NRBC # BLD: 0 /100 WBCS — SIGNIFICANT CHANGE UP (ref 0–0)
NT-PROBNP SERPL-SCNC: 138 PG/ML — SIGNIFICANT CHANGE UP (ref 0–300)
PLATELET # BLD AUTO: 172 K/UL — SIGNIFICANT CHANGE UP (ref 150–400)
POTASSIUM SERPL-MCNC: 3.6 MMOL/L — SIGNIFICANT CHANGE UP (ref 3.5–5.3)
POTASSIUM SERPL-SCNC: 3.6 MMOL/L — SIGNIFICANT CHANGE UP (ref 3.5–5.3)
PROT SERPL-MCNC: 6.5 G/DL — SIGNIFICANT CHANGE UP (ref 6–8.3)
RBC # BLD: 5.09 M/UL — SIGNIFICANT CHANGE UP (ref 4.2–5.8)
RBC # FLD: 12.9 % — SIGNIFICANT CHANGE UP (ref 10.3–14.5)
SODIUM SERPL-SCNC: 140 MMOL/L — SIGNIFICANT CHANGE UP (ref 135–145)
TROPONIN T, HIGH SENSITIVITY RESULT: 9 NG/L — SIGNIFICANT CHANGE UP (ref 0–51)
WBC # BLD: 8.76 K/UL — SIGNIFICANT CHANGE UP (ref 3.8–10.5)
WBC # FLD AUTO: 8.76 K/UL — SIGNIFICANT CHANGE UP (ref 3.8–10.5)

## 2023-08-06 PROCEDURE — 99285 EMERGENCY DEPT VISIT HI MDM: CPT | Mod: 25

## 2023-08-06 PROCEDURE — 93005 ELECTROCARDIOGRAM TRACING: CPT

## 2023-08-06 PROCEDURE — 71046 X-RAY EXAM CHEST 2 VIEWS: CPT

## 2023-08-06 PROCEDURE — 71046 X-RAY EXAM CHEST 2 VIEWS: CPT | Mod: 26

## 2023-08-06 PROCEDURE — 83690 ASSAY OF LIPASE: CPT

## 2023-08-06 PROCEDURE — 85025 COMPLETE CBC W/AUTO DIFF WBC: CPT

## 2023-08-06 PROCEDURE — 83880 ASSAY OF NATRIURETIC PEPTIDE: CPT

## 2023-08-06 PROCEDURE — 83735 ASSAY OF MAGNESIUM: CPT

## 2023-08-06 PROCEDURE — 80053 COMPREHEN METABOLIC PANEL: CPT

## 2023-08-06 PROCEDURE — 96374 THER/PROPH/DIAG INJ IV PUSH: CPT

## 2023-08-06 PROCEDURE — 99285 EMERGENCY DEPT VISIT HI MDM: CPT

## 2023-08-06 PROCEDURE — 84484 ASSAY OF TROPONIN QUANT: CPT

## 2023-08-06 RX ORDER — FAMOTIDINE 10 MG/ML
20 INJECTION INTRAVENOUS ONCE
Refills: 0 | Status: COMPLETED | OUTPATIENT
Start: 2023-08-06 | End: 2023-08-06

## 2023-08-06 RX ADMIN — Medication 30 MILLILITER(S): at 21:45

## 2023-08-06 RX ADMIN — FAMOTIDINE 20 MILLIGRAM(S): 10 INJECTION INTRAVENOUS at 21:45

## 2023-08-06 NOTE — ED PROVIDER NOTE - CLINICAL SUMMARY MEDICAL DECISION MAKING FREE TEXT BOX
68M multiple medical comorbidities including CAD w/ prior stent, HTN, HLD, presenting with atypical CP/SOB. Appears well, speaking in full sentences, VS WNL, no acute distress. Labs/imaging from [facility] reviewed, HST 9 therefore low suspicion for acute ischemia given time course of symptoms. CXR shows no acute pathology. EKG x2 with TWI in V3-V6, no CHELY/STD, 1st degree heart block. Spoke with patient's cardiologist Dr. Ronak Humphrey ([phone]) who confirmed that patient had a clean cath in 1/2020 and had ablation for A-fib in 05/2023. He reports that his prior EKGs have shown TWI in V3-V6. Given no new EKG changes and prior clean cath within <5y, extremely low suspicion for evolving ischemia. Dr. Humphrey states he will help arrange outpatient follow up for the patient. Results discussed with patient and wife and advised to call clinic in the AM to schedule follow up appointment. Given strict return precautions including worsened CP/SOB, syncope, lightheadedness, nausea/vomiting, or other concerning symptoms. Patient and wife expressed agreement and understanding of plan. -Oneida Eng MD (Attending)

## 2023-08-06 NOTE — ED PROVIDER NOTE - PHYSICAL EXAMINATION
GEN: awake and alert, well-appearing, no acute distress  SKIN: (+) warm/dry, (-) cyanosis, (-) rash  HEAD: (-) scalp swelling, (-) tenderness  EYES: (-) conjunctival pallor, (-) scleral icterus  ENMT: (+) moist mucous membranes, (+) airway patent, (-) stridor  NECK: (-) tenderness, (-) stiffness  CV: (+) RRR, (-) murmurs/rubs/gallops  RESP: (+) CTABL, (-) increased WOB, (-) rales, (-) rhonchi, (-) wheezing  ABD: (+) soft, (-) tenderness, (-) guarding  EXT: (-) joint deformities, (-) edema, (-) tenderness, (+) grossly intact ROM, (+) equal pulses in upper & lower extremities  NEURO: mental status as above, (-) gross strength/sensation deficits

## 2023-08-06 NOTE — ED ADULT NURSE NOTE - OBJECTIVE STATEMENT
69 y/o male, A&O x3, presents to ED c/o epigastric discomfort. PMH COPD, asthma, BPH, CAD s/p stent, GERD, HTN, HLD, IBS, MI, hypothyroidism, ablation, and prostate cancer. Pt endorse intermittent midsternal chest pain that has worsened over the last day. Pain is located in epigastric region that radiates to back and left arm. Pt denies taking any medications to relieve pain at home. Pt placed on cardiac monitor. Pt denies fever/chills, n/v/d, SOB, abdominal pain, urinary symptoms, and dizziness. Pt comfortably resting in bed locked in lowest position.

## 2023-08-06 NOTE — ED PROVIDER NOTE - RAPID ASSESSMENT
MD Herring: 68-year-old male, past medical history of COPD/Asthma, BPH, CAD s/p stent, COPD, GERD, HTN, HLD, IBS, MI, hypothyroidism, ?SVT s/p ablation, and prostate CA s/p prostatectomy, presents to ED complaining of worsening, intermittent, midsternal, burning chest pain radiating to back and left arm x3 days. Patient denies fever/chills, shortness of breath, abdominal pain, nausea/vomiting, urinary symptoms, palpitations, lightheadedness/dizziness, weakness/numbness.    Card: Dr. Ronak Humphrey ([phone])    Patient was rapidly assessed via a telemedicine and/or role of Quick Triage Doctor; a limited history, physical exam and assessment was performed. The patient will be seen and further evaluated in the main emergency department. The remainder of care and evaluation will be conducted by the primary emergency medicine team. Receiving team will follow up on labs, imaging and serially reassess patient as indicated. All further decisions regarding patient care, evaluation and disposition are at the discretion of the receiving primary emergency department team.

## 2023-08-06 NOTE — ED ADULT NURSE NOTE - NS ED NURSE LEVEL OF CONSCIOUSNESS MENTAL STATUS
Intermittent episodes of difficulty with completing the swallow. Has food sticking in the middle of the chest and has to drink water to let it down. She has had a few episodes where she needed to throw up because she could not get it passed down.   She is Awake/Alert/Cooperative

## 2023-08-06 NOTE — ED PROVIDER NOTE - NSFOLLOWUPINSTRUCTIONS_ED_ALL_ED_FT
You were seen in the ED for chest pain. Your blood work, X-ray, and electrocardiogram were reassuring. Call Dr. Humphrey in the morning to schedule a follow up appointment.    Return to the ER if you have worsened chest pain or difficulty breathing, nausea or vomiting, lightheadedness or fainting, or other symptoms that concern you.

## 2023-08-06 NOTE — ED PROVIDER NOTE - PATIENT PORTAL LINK FT
You can access the FollowMyHealth Patient Portal offered by [facility] by registering at the following website: [email or web address]. By joining [employer]’s FollowMyHealth portal, you will also be able to view your health information using other applications (apps) compatible with our system.

## 2023-08-06 NOTE — ED ADULT NURSE NOTE - NSFALLUNIVINTERV_ED_ALL_ED
Bed/Stretcher in lowest position, wheels locked, appropriate side rails in place/Call bell, personal items and telephone in reach/Instruct patient to call for assistance before getting out of bed/chair/stretcher/Non-slip footwear applied when patient is off stretcher/[place] to call system/Physically safe environment - no spills, clutter or unnecessary equipment/Purposeful proactive rounding/Room/bathroom lighting operational, light cord in reach

## 2023-08-06 NOTE — ED ADULT TRIAGE NOTE - CHIEF COMPLAINT QUOTE
chest burning and pressure like sensation beginning Friday, intermittent, now becoming more constant. Denies SOB, f/c.

## 2023-08-06 NOTE — ED PROVIDER NOTE - NS ED ROS FT
GEN: (-) fever, (-) fatigue  SKIN: (-) rash, (-) itching  EYES: (-) blurry vision, (-) double vision  ENMT: (-) congestion, (-) sore throat  CV: (+) chest pain, (-) palpitations  RESP: (-) cough, (+) SOB  GI: (-) abdominal pain, (-) nausea/vomiting  : (-) dysuria, (-) hematuria  MSK: (-) back pain, (-) joint pain  NEURO: (-) headache, (-) dizziness  PSYCH: (-) emotional stress

## 2023-09-15 NOTE — ED ADULT NURSE NOTE - NSSISCREENINGQ3_ED_A_ED
V2.0  [facility] Hospitalist Progress Note      Name:  Claudell Gone /Age/Sex: 1949  (67 y.o. male)   MRN & CSN:  [id number] & [id number] Encounter Date/Time: 9/15/2023 4:30 PM EDT    [facility]: [facility] Day: 2    Assessment and Plan:   Claudell Gone is a 66 y.o. male with pmh of severe COPD, chronic respiratory failure on 3 L NC, hypertension, HLD and type 2 diabetes who presents with Generalized weakness    Plan:  # Generalized weakness  - Endorsed increased weakness over the past few days. No focal weaknesses, numbness, falls or other concerns. Having decreased PO intake as below. Doesn't require any assistive devices for ambulation.  - Exam non-focal  -OT evaluation recommended discharge to SNF. # Very severe COPD without exacerbation  # Chronic hypoxemic hypercapnic respiratory failure  - Denied any increase in SOB or change in cough. No fevers, sick contacts or travel. Quit smoking over 10 years prior. Baseline on 3L NC continuously. PFTs in  with severe obstruction.  - At [facility] ED, CXR nonacute. Received Solu-Medrol. Flu and COVID negative. VBG 7.36/53.  -wheezing in bilateral lungs  -continue breathing treatment, continue prednisone 40 mg daily and Augmentin    # GERD  -whole body ache, including abdomen uncomfortable and poor appetite  -hemoglobin 10.4 decreased 1 gram from baseline  -denied blackstool or bloody stool  - Continue home PPI. -appreciate GI input: outpatient follow up to schedule EGD  -monitor CBC    # Non-MI troponin elevation  - Denied any typical CP, unclear why it was obtained. - Initial Tn mildly elevated, repeat flat x2. ECG without acute ischemic changes. # Essential hypretension  - Continue home Norvasc and Toprol-XL. # Hypomagnesemia  - Moderate, repleted. - Follow-up repeat labs.      # T2DM with hyperglycemia  - Last A1c 7.6% in 2023, repeat pending.   - Ha1c 8.8   -increase Lantus to 12 units, insuline sliding chloride flush  5-40 mL IntraVENous 2 times per day    enoxaparin  40 mg SubCUTAneous QPM    insulin lispro  0-4 Units SubCUTAneous TID WC    insulin lispro  0-4 Units SubCUTAneous Nightly    guaiFENesin  400 mg Oral BID    potassium bicarb-citric acid  20 mEq Oral Daily      Infusions:    sodium chloride Stopped (09/15/23 0333)    dextrose       PRN Meds: albuterol sulfate HFA, 2 puff, Q4H PRN   And  ipratropium, 2 puff, Q4H PRN  sodium chloride flush, 5-40 mL, PRN  sodium chloride, , PRN  potassium chloride, 10 mEq, PRN  magnesium sulfate, 2,000 mg, PRN  ondansetron, 4 mg, Q8H PRN   Or  ondansetron, 4 mg, Q6H PRN  polyethylene glycol, 17 g, Daily PRN  acetaminophen, 650 mg, Q6H PRN   Or  acetaminophen, 650 mg, Q6H PRN  glucose, 4 tablet, PRN  dextrose bolus, 125 mL, PRN   Or  dextrose bolus, 250 mL, PRN  glucagon (rDNA), 1 mg, PRN  dextrose, , Continuous PRN        Labs      Recent Results (from the past 24 hour(s))   POCT Glucose    Collection Time: 09/14/23 10:15 PM   Result Value Ref Range    POC Glucose 269 (H) 70 - 99 MG/DL   EKG 12 Lead    Collection Time: 09/14/23 11:16 PM   Result Value Ref Range    Ventricular Rate 95 BPM    Atrial Rate 95 BPM    P-R Interval 172 ms    QRS Duration 130 ms    Q-T Interval 380 ms    QTc Calculation (Bazett) 477 ms    P Axis 65 degrees    R Axis -76 degrees    T Axis 86 degrees    Diagnosis       Sinus rhythm with occasional premature ventricular complexes and premature atrial complexes  Left axis deviation  Right bundle branch block  Abnormal ECG  When compared with ECG of 14-MAY-2021 10:37,  premature ventricular complexes are now present  premature atrial complexes are now present  Confirmed by Nanda Mullins MD, Juan Jose Ahuja ([id number]) on 9/15/2023 9:04:29 AM     Basic Metabolic Panel w/ Reflex to MG    Collection Time: 09/15/23  5:31 AM   Result Value Ref Range    Sodium 133 (L) 135 - 145 MMOL/L    Potassium 4.7 3.5 - 5.1 MMOL/L    Chloride 96 (L) 99 - 110 mMol/L    CO2 26 21 - 32 MMOL/L No

## 2023-09-27 ENCOUNTER — APPOINTMENT (OUTPATIENT)
Dept: COLORECTAL SURGERY | Facility: CLINIC | Age: 68
End: 2023-09-27
Payer: MEDICARE

## 2023-09-27 DIAGNOSIS — K62.5 HEMORRHAGE OF ANUS AND RECTUM: ICD-10-CM

## 2023-09-27 DIAGNOSIS — K62.4 STENOSIS OF ANUS AND RECTUM: ICD-10-CM

## 2023-09-27 DIAGNOSIS — K62.89 OTHER SPECIFIED DISEASES OF ANUS AND RECTUM: ICD-10-CM

## 2023-09-27 PROCEDURE — 99203 OFFICE O/P NEW LOW 30 MIN: CPT | Mod: 25

## 2023-09-27 PROCEDURE — 46604Z: CUSTOM

## 2023-09-27 RX ORDER — HYDROCORTISONE 25 MG/G
2.5 CREAM TOPICAL
Qty: 1 | Refills: 5 | Status: ACTIVE | COMMUNITY
Start: 2023-09-27 | End: 1900-01-01

## 2023-09-27 RX ORDER — HYDROCORTISONE ACETATE 30 MG/1
30 SUPPOSITORY RECTAL
Qty: 30 | Refills: 0 | Status: ACTIVE | COMMUNITY
Start: 2023-09-27 | End: 1900-01-01

## 2023-10-27 NOTE — ED ADULT NURSE NOTE - PATIENT/CAREGIVER OFFERED  INTERPRETER SERVICES
Cardiology Follow Up    Jair Ramirez  1968  [id number]  [facility]  [address]  [address]  [facility] [id number] [phone]    1. PSVT (paroxysmal supraventricular tachycardia)  POCT ECG    Echo complete w/ contrast if indicated    AMB extended holter monitor      2. PAC (premature atrial contraction)  Echo complete w/ contrast if indicated    AMB extended holter monitor      3. Dyslipidemia            Discussion/Summary:  Ms. Dorie Damon is a pleasant 57-year-old female who presents to the office today for routine follow-up. Since her last visit she has been feeling relatively well. She has a known history of PSVT and PACs. Up until yesterday her symptoms were relatively well controlled. I have asked that she undergo a Zio patch to look for symptom-rhythm correlation. I have also asked for an updated echocardiogram.    Her blood pressure is otherwise controlled. I have no recent assessment of her lipids. Her last lipid profile from 2021 reveals an elevated LDL. She is due to have these reassessed by her primary care provider and I will await the results. I will see her back in the office in one year or sooner if deemed necessary. Interval History:  Ms. Dorie Damon is a pleasant 57-year-old female who presents to the office today for routine follow-up. Since her last visit she has remained very active. She has a Peloton bike which she utilizes regularly. With the activity she performs she denies any cardiopulmonary symptoms of chest pain or shortness of breath. She denies any signs or symptoms of congestive heart failure including increasing lower extremity edema, paroxysmal nocturnal dyspnea, orthopnea, acute weight gain or increasing abdominal girth. She denies lightheadedness, syncope or presyncope. She denies symptoms of claudication. Yesterday she had a particularly bad day.   She felt like her heart was racing for prolonged periods of time. She also had hard beating and fluttering. It lasted all day and when she woke up this morning she felt well.     Problem List       Hypercholesteremia    IBS (irritable bowel syndrome)    Menorrhagia with irregular cycle    Diverticulitis    Cyst of uterus    Corneal abrasion    Ovarian cyst    PSVT (paroxysmal supraventricular tachycardia) (HCC)    Seasonal allergies    PAC (premature atrial contraction)    Mixed dyslipidemia    Palpitation    Achilles tendinitis of right lower extremity    Acute medial meniscus tear, right, subsequent encounter    Acute pain of right knee          Past Medical History:   Diagnosis Date    Hypercholesterolemia     Palpitations     Paroxysmal SVT (supraventricular tachycardia)      Social History     Socioeconomic History    Marital status: /Civil Union     Spouse name: Not on file    Number of children: Not on file    Years of education: Not on file    Highest education level: Not on file   Occupational History    Not on file   Tobacco Use    Smoking status: Former    Smokeless tobacco: Never    Tobacco comments:     quit in 25s    Vaping Use    Vaping Use: Never used   Substance and Sexual Activity    Alcohol use: Yes    Drug use: No    Sexual activity: Yes     Partners: Male     Birth control/protection: Post-menopausal   Other Topics Concern    Not on file   Social History Narrative    Not on file     Social Determinants of Health     Financial Resource Strain: Not on file   Food Insecurity: Not on file   Transportation Needs: Not on file   Physical Activity: Not on file   Stress: Not on file   Social Connections: Not on file   Intimate Partner Violence: Not on file   Housing Stability: Not on file      Family History   Problem Relation Age of Onset    Cancer Mother     Stroke Mother     Dementia Mother     Breast cancer Mother 59    Hyperlipidemia Mother     Arrhythmia Father     Hypertension Father     Atrial fibrillation Father     Melanoma Sister 50        with mets    No Known Problems Brother     No Known Problems Maternal Grandmother     Cancer Maternal Grandfather     No Known Problems Paternal Grandmother     No Known Problems Paternal Grandfather     No Known Problems Daughter     No Known Problems Daughter     No Known Problems Paternal Aunt      Past Surgical History:   Procedure Laterality Date    BREAST BIOPSY Right 03/03/2003    stereotactic core bx    DILATION AND CURETTAGE OF UTERUS  2003    ENDOMETRIAL ABLATION  11/2009    OOPHORECTOMY Right 04/2012    Robotic assisted laparoscopic RSO, excision endometriosis, elevated        Current Outpatient Medications:     Calcium Carb-Cholecalciferol 1000-800 MG-UNIT TABS, Take 1 tablet by mouth daily, Disp: , Rfl:     cetirizine (ZyrTEC) 10 mg tablet, Take 10 mg by mouth daily, Disp: , Rfl:     Cyanocobalamin (VITAMIN B 12 PO), Take by mouth, Disp: , Rfl:     diltiazem (CARDIZEM CD) 120 mg 24 hr capsule, TAKE 1 CAPSULE DAILY, Disp: 90 capsule, Rfl: 3    hyoscyamine (ANASPAZ,LEVSIN) 0.125 MG tablet, Take 1 tablet (0.125 mg total) by mouth every 4 (four) hours as needed for cramping, Disp: 30 tablet, Rfl: 0    Magnesium 100 MG CAPS, Take by mouth, Disp: , Rfl:     Misc Natural Products (OSTEO BI-FLEX/5-LOXIN ADVANCED PO), Take by mouth, Disp: , Rfl:     Omega-3 Fatty Acids (FISH OIL) 645 MG CAPS, Take 1 capsule by mouth 2 (two) times a day , Disp: , Rfl:     valACYclovir (VALTREX) 1,000 mg tablet, Take 1 tablet by mouth 3 (three) times a day as needed, Disp: , Rfl:     methylPREDNISolone 4 MG tablet therapy pack, Use as directed on package, Disp: 21 each, Rfl: 0    raloxifene (EVISTA) 60 mg tablet, Take 1 tablet (60 mg total) by mouth daily (Patient not taking: Reported on 10/27/2023), Disp: 30 tablet, Rfl: 3  No Known Allergies    Labs:     Chemistry        Component Value Date/Time    K 4.5 02/25/2021 0739     (H) 02/25/2021 0739    CO2 28 02/25/2021 0739    BUN 12 02/25/2021 0739    CREATININE 0.80 02/25/2021 0739        Component Value Date/Time    CALCIUM 9.7 02/25/2021 0739    ALKPHOS 94 02/25/2021 0739    AST 14 02/25/2021 0739    ALT 22 02/25/2021 0739            No results found for: "CHOL"  Lab Results   Component Value Date    HDL 66 02/25/2021    HDL 82 (H) 04/14/2017     Lab Results   Component Value Date    LDLCALC 168 (H) 02/25/2021    LDLCALC 138 (H) 04/14/2017     Lab Results   Component Value Date    TRIG 75 02/25/2021    TRIG 45 04/14/2017     No results found for: "CHOLHDL"    Imaging: No results found. ECG:  Sinus bradycardia, otherwise normal ECG      Review of Systems   Cardiovascular:  Positive for irregular heartbeat and palpitations. Negative for chest pain, claudication, cyanosis, dyspnea on exertion and leg swelling. All other systems reviewed and are negative. Vitals:    10/27/23 1456   BP: 104/80   Pulse: 66     Vitals:    10/27/23 1456   Weight: 99.3 kg (219 lb)     Height: 5' 5" (165.1 cm)   Body mass index is 36.44 kg/m².     Physical Exam:  General:  Alert and cooperative, appears stated age  HEENT:  PERRLA, EOMI, no scleral icterus, no conjunctival pallor  Neck:  No lymphadenopathy, no thyromegaly, no carotid bruits, no elevated JVP  Heart:  Regular rate and rhythm, normal S1/S2, no S3/S4, no murmur  Lungs:  Clear to auscultation bilaterally   Abdomen:  Soft, non-tender, positive bowel sounds, no rebound or guarding,   no organomegaly   Extremities:  No clubbing, cyanosis or edema   Vascular:  2+ pedal pulses  Skin:  No rashes or lesions on exposed skin  Neurologic:  Cranial nerves II-XII grossly intact without focal deficits yes

## 2023-11-09 NOTE — ED ADULT NURSE NOTE - NSFALLASSESSNEED_ED_ALL_ED
[address] [facility]    NAME: Emily Ku  AGE: 53 y.o. SEX: female  : 1979     DATE: 2023     Assessment and Plan:     Problem List Items Addressed This Visit    None  Visit Diagnoses     Annual physical exam    -  Primary    Encounter for immunization        Relevant Orders    influenza vaccine, quadrivalent, 0.5 mL, preservative-free, for adult and pediatric patients 6 mos+ (AFLURIA, FLUARIX, FLULAVAL, FLUZONE) (Completed)    Perimenopause        Relevant Orders    Lipid Panel with Direct LDL reflex    CBC    Comprehensive metabolic panel    TSH, 3rd generation with Free T4 reflex    Ambulatory referral to Obstetrics / Gynecology    CK    POCT ECG (Completed)    Vitamin D 25 hydroxy    Chest pain, unspecified type        Relevant Orders    Lipid Panel with Direct LDL reflex    CBC    Comprehensive metabolic panel    TSH, 3rd generation with Free T4 reflex    Ambulatory referral to Obstetrics / Gynecology    CK    POCT ECG (Completed)    Vitamin D 25 hydroxy        Recommend labs as ordered   EKG reassuring in office today  Referrals provided for counseling resources, nutritionist  Referral to menopause specialist  Follow up after labs    Immunizations and preventive care screenings were discussed with patient today. Appropriate education was printed on patient's after visit summary. Counseling:  Alcohol/drug use: discussed moderation in alcohol intake, the recommendations for healthy alcohol use, and avoidance of illicit drug use. Dental Health: discussed importance of regular tooth brushing, flossing, and dental visits. Exercise: the importance of regular exercise/physical activity was discussed. Recommend exercise 3-5 times per week for at least 30 minutes. Depression Screening and Follow-up Plan: Patient was screened for depression during today's encounter.  They screened negative with a PHQ-2 score of 0.      Return in about 1 year (around 11/9/2024) for Annual physical.     Chief Complaint:     Chief Complaint   Patient presents with   • Establish Care   • Physical Exam   • Anxiety      History of Present Illness:     Adult Annual Physical   Patient here for a comprehensive physical exam. The patient reports problems - see below . Patient reports she's been having a variety of concerns over the last year or so. They will pop up and then resolve. She had pain in her shoulders and hips, but this seemed to improve when she stopped eating corn chips. She had pain/burning that felt like a ring of fire around her scalp, but that resolved on it's own. She feels intermittent chest pain at times, and also feels short of breath when she's talking. No palpitations. She has had anxiety all her life but over the last few years with family stressors and Covid she feels that it's gotten worse. She feels shaky at times. She lost weight (110+lbs) due to not feeling well and making diet changes (Whole 30) which she still follows. She notices she's sensitive to wheat. Her weight has overall stabilized. Her sleep has been interrupted because she's sleeping in other places (couch was better, but in her son's room she feels she 's not sleeping well due to dust). She meditates to help, and does yoga 5 days a week for 10 minutes. She feels like her hormones are off as well ,contributing to a lot of the above. She has had lighter periods lately. She also felt that some of her previous healthcare with [facility] was causing extra distress, and that she perhaps had more testing than necessary, but this contributed to her healthcare related anxiety. Diet and Physical Activity  Diet/Nutrition: well balanced diet. Exercise: walking, moderate cardiovascular exercise, and 5-7 times a week on average.       Depression Screening  PHQ-2/9 Depression Screening    Little interest or pleasure in doing things: 0 - not at all  Feeling down, depressed, or hopeless: 0 - not at all  PHQ-2 Score: 0  PHQ-2 Interpretation: Negative depression screen       General Health  Sleep: sleeps poorly. Hearing: normal - bilateral.  Vision: most recent eye exam <1 year ago. Dental: regular dental visits. /GYN Health  Patient is: perimenopausal  Last menstrual period: Patient's last menstrual period was 10/26/2023 (approximate). Review of Systems:     Review of Systems   Constitutional:  Negative for activity change, chills and fever. HENT:  Negative for congestion, rhinorrhea and sore throat. Eyes:  Negative for visual disturbance. Respiratory:  Positive for chest tightness and shortness of breath. Negative for cough and wheezing. Cardiovascular:  Positive for chest pain. Negative for palpitations. Gastrointestinal:  Negative for abdominal pain, blood in stool, constipation, diarrhea, nausea and vomiting. Genitourinary:  Negative for dysuria. Musculoskeletal:  Positive for arthralgias. Negative for myalgias. Skin:  Negative for rash. Neurological:  Negative for dizziness, weakness and headaches. Psychiatric/Behavioral:  Positive for sleep disturbance. The patient is nervous/anxious. All other systems reviewed and are negative. Past Medical History:     History reviewed. No pertinent past medical history. Past Surgical History:     Past Surgical History:   Procedure Laterality Date   •  SECTION     • MAMMO STEREOTACTIC BREAST BIOPSY LEFT (ALL INC)        Social History:     Social History     Socioeconomic History   • Marital status: /Civil Union     Spouse name: None   • Number of children: None   • Years of education: None   • Highest education level: None   Occupational History   • None   Tobacco Use   • Smoking status: Never     Passive exposure: Past   • Smokeless tobacco: Never   Vaping Use   • Vaping Use: Never used   Substance and Sexual Activity   • Alcohol use:  Yes   • Drug use: Never   • Sexual activity: None   Other Topics Concern   • None   Social History Narrative   • None     Social Determinants of Health     Financial Resource Strain: Not on file   Food Insecurity: Not on file   Transportation Needs: Not on file   Physical Activity: Not on file   Stress: Not on file   Social Connections: Not on file   Intimate Partner Violence: Not on file   Housing Stability: Not on file      Family History:     Family History   Problem Relation Age of Onset   • Melanoma Mother    • Hyperlipidemia Father    • Heart attack Father    • Breast cancer Maternal Grandmother 46      Current Medications:     Current Outpatient Medications   Medication Sig Dispense Refill   • Cholecalciferol 25 MCG (1000 UT) CHEW Chew     • Calcium Carbonate 1500 (600 Ca) MG TABS  (Patient not taking: Reported on 11/9/2023)       No current facility-administered medications for this visit. Allergies: Allergies   Allergen Reactions   • Alcohol Gel Base - Food Allergy Hives   • Azithromycin Hives     Other reaction(s): hives      Physical Exam:     /80   Pulse 88   Temp 97.9 °F (36.6 °C)   Ht 5' 9" (1.753 m)   Wt 69.7 kg (153 lb 9.6 oz)   LMP 10/26/2023 (Approximate)   SpO2 99%   BMI 22.68 kg/m²     Physical Exam  Vitals and nursing note reviewed. Constitutional:       General: She is not in acute distress. Appearance: She is well-developed. She is not ill-appearing. HENT:      Head: Normocephalic and atraumatic. Right Ear: Tympanic membrane, ear canal and external ear normal. No middle ear effusion. Left Ear: Tympanic membrane, ear canal and external ear normal.  No middle ear effusion. Nose: Nose normal. No congestion or rhinorrhea. Mouth/Throat:      Lips: Pink. Mouth: Mucous membranes are moist.      Pharynx: Oropharynx is clear. Uvula midline. No oropharyngeal exudate. Tonsils: No tonsillar exudate.    Eyes:      General: Lids are normal.      Extraocular Movements: Extraocular movements intact. Conjunctiva/sclera: Conjunctivae normal.      Pupils: Pupils are equal, round, and reactive to light. Neck:      Thyroid: No thyromegaly. Trachea: No tracheal deviation. Cardiovascular:      Rate and Rhythm: Normal rate and regular rhythm. Pulses: Normal pulses. Heart sounds: Normal heart sounds, S1 normal and S2 normal. No murmur heard. Pulmonary:      Effort: Pulmonary effort is normal. No respiratory distress. Breath sounds: Normal breath sounds. No decreased breath sounds, wheezing, rhonchi or rales. Abdominal:      General: Bowel sounds are normal. There is no distension. Palpations: Abdomen is soft. Tenderness: There is no abdominal tenderness. Musculoskeletal:      Right lower leg: No edema. Left lower leg: No edema. Lymphadenopathy:      Cervical: No cervical adenopathy. Skin:     General: Skin is warm and dry. Capillary Refill: Capillary refill takes less than 2 seconds. Neurological:      Mental Status: She is alert and oriented to person, place, and time. Deep Tendon Reflexes: Reflexes normal.      Reflex Scores:       Patellar reflexes are 2+ on the right side and 2+ on the left side. Psychiatric:         Attention and Perception: Attention normal.         Mood and Affect: Mood is anxious. Behavior: Behavior normal. Behavior is cooperative. Thought Content: Thought content does not include suicidal ideation.       Comments: Tearful through visit, theresa Luciano MD  [address] no

## 2023-11-10 ENCOUNTER — APPOINTMENT (OUTPATIENT)
Dept: COLORECTAL SURGERY | Facility: HOSPITAL | Age: 68
End: 2023-11-10

## 2024-05-30 ENCOUNTER — RX RENEWAL (OUTPATIENT)
Age: 69
End: 2024-05-30

## 2024-05-30 RX ORDER — HYDROCORTISONE 25 MG/G
2.5 CREAM TOPICAL
Qty: 30 | Refills: 4 | Status: ACTIVE | COMMUNITY
Start: 2024-05-30 | End: 1900-01-01

## 2024-08-22 ENCOUNTER — INPATIENT (INPATIENT)
Facility: HOSPITAL | Age: 69
LOS: 1 days | Discharge: HOME | DRG: 313 | End: 2024-08-24
Attending: STUDENT IN AN ORGANIZED HEALTH CARE EDUCATION/TRAINING PROGRAM | Admitting: STUDENT IN AN ORGANIZED HEALTH CARE EDUCATION/TRAINING PROGRAM
Payer: MEDICARE

## 2024-08-22 VITALS
RESPIRATION RATE: 20 BRPM | WEIGHT: 218.48 LBS | TEMPERATURE: 98 F | SYSTOLIC BLOOD PRESSURE: 169 MMHG | HEART RATE: 78 BPM | OXYGEN SATURATION: 96 % | DIASTOLIC BLOOD PRESSURE: 94 MMHG

## 2024-08-22 DIAGNOSIS — Z95.5 PRESENCE OF CORONARY ANGIOPLASTY IMPLANT AND GRAFT: Chronic | ICD-10-CM

## 2024-08-22 LAB
BASOPHILS # BLD AUTO: 0.05 K/UL — SIGNIFICANT CHANGE UP (ref 0–0.2)
BASOPHILS NFR BLD AUTO: 0.5 % — SIGNIFICANT CHANGE UP (ref 0–2)
EOSINOPHIL # BLD AUTO: 0.37 K/UL — SIGNIFICANT CHANGE UP (ref 0–0.5)
EOSINOPHIL NFR BLD AUTO: 3.9 % — SIGNIFICANT CHANGE UP (ref 0–6)
HCT VFR BLD CALC: 44 % — SIGNIFICANT CHANGE UP (ref 39–50)
HGB BLD-MCNC: 14.5 G/DL — SIGNIFICANT CHANGE UP (ref 13–17)
IMM GRANULOCYTES NFR BLD AUTO: 0.2 % — SIGNIFICANT CHANGE UP (ref 0–0.9)
LYMPHOCYTES # BLD AUTO: 1.65 K/UL — SIGNIFICANT CHANGE UP (ref 1–3.3)
LYMPHOCYTES # BLD AUTO: 17.3 % — SIGNIFICANT CHANGE UP (ref 13–44)
MCHC RBC-ENTMCNC: 30.7 PG — SIGNIFICANT CHANGE UP (ref 27–34)
MCHC RBC-ENTMCNC: 33 GM/DL — SIGNIFICANT CHANGE UP (ref 32–36)
MCV RBC AUTO: 93 FL — SIGNIFICANT CHANGE UP (ref 80–100)
MONOCYTES # BLD AUTO: 0.98 K/UL — HIGH (ref 0–0.9)
MONOCYTES NFR BLD AUTO: 10.3 % — SIGNIFICANT CHANGE UP (ref 2–14)
NEUTROPHILS # BLD AUTO: 6.45 K/UL — SIGNIFICANT CHANGE UP (ref 1.8–7.4)
NEUTROPHILS NFR BLD AUTO: 67.8 % — SIGNIFICANT CHANGE UP (ref 43–77)
NRBC # BLD: 0 /100 WBCS — SIGNIFICANT CHANGE UP (ref 0–0)
PLATELET # BLD AUTO: 182 K/UL — SIGNIFICANT CHANGE UP (ref 150–400)
RBC # BLD: 4.73 M/UL — SIGNIFICANT CHANGE UP (ref 4.2–5.8)
RBC # FLD: 13.3 % — SIGNIFICANT CHANGE UP (ref 10.3–14.5)
WBC # BLD: 9.52 K/UL — SIGNIFICANT CHANGE UP (ref 3.8–10.5)
WBC # FLD AUTO: 9.52 K/UL — SIGNIFICANT CHANGE UP (ref 3.8–10.5)

## 2024-08-22 PROCEDURE — 99285 EMERGENCY DEPT VISIT HI MDM: CPT

## 2024-08-22 RX ORDER — ACETAMINOPHEN 325 MG/1
650 TABLET ORAL ONCE
Refills: 0 | Status: COMPLETED | OUTPATIENT
Start: 2024-08-22 | End: 2024-08-22

## 2024-08-22 RX ADMIN — ACETAMINOPHEN 650 MILLIGRAM(S): 325 TABLET ORAL at 23:29

## 2024-08-22 NOTE — ED PROVIDER NOTE - OBJECTIVE STATEMENT
69 male past medical history of` asthma/COPD, ischemic heart disease status post stent, atrial fibrillation status post ablation in 2023, hypertension, hyperlipidemia, status post prostatectomy, follows up with Dr. Feliciano Herrera at [facility], recent upper GI endoscopy in 2024–within normal limits, now presenting with central/left-sided chest pain for the past 1 day.  Pain is central/left-sided, burning/aching, radiating to left shoulder and left upper arm, started at 2 PM, at rest, gradually resolving, on ED presentation is improved.  Accompanying symptoms include lightheadedness, shortness of breath, nausea.  Patient denies similar chest pain after similar intensity in the past.  No allergies to medications.

## 2024-08-22 NOTE — ED PROVIDER NOTE - ATTENDING CONTRIBUTION TO CARE
Left chest pain with associated shortness of breath occasionally to the left back.  No vomiting or diaphoresis.  Has been going on for a day.  The pain is a burning type pain which is not described as tearing or sudden in onset.  He has had pain like this in the past.  No fevers chills cough.  Awake alert talking no acute distress.  Regular rate and rhythm.  No murmur.  Clear lungs.  Nontender abdomen.  2+ radial pulses.  Clinically not concerned with aortic dissection.  PE is less likely as the patient is already on anticoagulation.  He states he has been taking it.  Will need to rule out acute coronary syndrome.  Patient has a ST depressions T wave inversions laterally inferiorly however these appear similar to the EKG in 2023 and my independent interpretation of the tracing.  CBC CMP cardiac enzyme chest x-ray.  Patient is very high risk and with concerning story and would likely benefit from cardiology consult and possible catheterization.  Plan to admit more likely than observation unit.

## 2024-08-22 NOTE — ED PROVIDER NOTE - PROGRESS NOTE DETAILS
[place] HARRIS: Patient reassessed, pain improved but still remains. Will admit to be seen by Cardiology. CDU no space. [place] HARRIS: Patient reassessed, pain improved but still remains. Will admit to be seen by Cardiology. Mentions headache, shall give pain med. Patient amenable to plan. CDU has no space.

## 2024-08-22 NOTE — ED ADULT TRIAGE NOTE - CHIEF COMPLAINT QUOTE
c/o chest pain and back pain xs 1 day with elevated BP readings at home c/o chest pain and back pain xs 1 day with elevated BP readings at home pt with hx of cardiac stent.

## 2024-08-22 NOTE — ED PROVIDER NOTE - CLINICAL SUMMARY MEDICAL DECISION MAKING FREE TEXT BOX
69 male past medical history of` asthma/COPD, ischemic heart disease status post stent, atrial fibrillation status post ablation in 2023, hypertension, hyperlipidemia, status post prostatectomy, follows up with Dr. Feliciano Herrera at [facility], recent upper GI endoscopy in 2024–within normal limits, now presenting with central/left-sided chest pain for the past 1 day. /64, patient has used his routine Anti-HTN prior to presentation. EKG TWI V4-6, II, III. Will w/u for ACS. Shall also check flu panel, CXR to r/o PNA, pleural eff. Likely admit for cardiac w/u.

## 2024-08-23 DIAGNOSIS — Z29.9 ENCOUNTER FOR PROPHYLACTIC MEASURES, UNSPECIFIED: ICD-10-CM

## 2024-08-23 DIAGNOSIS — R07.9 CHEST PAIN, UNSPECIFIED: ICD-10-CM

## 2024-08-23 DIAGNOSIS — I48.20 CHRONIC ATRIAL FIBRILLATION, UNSPECIFIED: ICD-10-CM

## 2024-08-23 DIAGNOSIS — K21.9 GASTRO-ESOPHAGEAL REFLUX DISEASE WITHOUT ESOPHAGITIS: ICD-10-CM

## 2024-08-23 DIAGNOSIS — I25.10 ATHEROSCLEROTIC HEART DISEASE OF NATIVE CORONARY ARTERY WITHOUT ANGINA PECTORIS: ICD-10-CM

## 2024-08-23 LAB
ALBUMIN SERPL ELPH-MCNC: 3.8 G/DL — SIGNIFICANT CHANGE UP (ref 3.3–5)
ALP SERPL-CCNC: 76 U/L — SIGNIFICANT CHANGE UP (ref 40–120)
ALT FLD-CCNC: 15 U/L — SIGNIFICANT CHANGE UP (ref 10–45)
ANION GAP SERPL CALC-SCNC: 9 MMOL/L — SIGNIFICANT CHANGE UP (ref 5–17)
APTT BLD: 37.9 SEC — HIGH (ref 24.5–35.6)
AST SERPL-CCNC: 15 U/L — SIGNIFICANT CHANGE UP (ref 10–40)
BILIRUB SERPL-MCNC: 0.3 MG/DL — SIGNIFICANT CHANGE UP (ref 0.2–1.2)
BUN SERPL-MCNC: 18 MG/DL — SIGNIFICANT CHANGE UP (ref 7–23)
CALCIUM SERPL-MCNC: 9.9 MG/DL — SIGNIFICANT CHANGE UP (ref 8.4–10.5)
CHLORIDE SERPL-SCNC: 107 MMOL/L — SIGNIFICANT CHANGE UP (ref 96–108)
CO2 SERPL-SCNC: 26 MMOL/L — SIGNIFICANT CHANGE UP (ref 22–31)
CREAT SERPL-MCNC: 1.09 MG/DL — SIGNIFICANT CHANGE UP (ref 0.5–1.3)
EGFR: 73 ML/MIN/1.73M2 — SIGNIFICANT CHANGE UP
FLUAV AG NPH QL: SIGNIFICANT CHANGE UP
FLUBV AG NPH QL: SIGNIFICANT CHANGE UP
GLUCOSE SERPL-MCNC: 96 MG/DL — SIGNIFICANT CHANGE UP (ref 70–99)
INR BLD: 1.47 RATIO — HIGH (ref 0.85–1.18)
NT-PROBNP SERPL-SCNC: 211 PG/ML — SIGNIFICANT CHANGE UP (ref 0–300)
POTASSIUM SERPL-MCNC: 3.7 MMOL/L — SIGNIFICANT CHANGE UP (ref 3.5–5.3)
POTASSIUM SERPL-SCNC: 3.7 MMOL/L — SIGNIFICANT CHANGE UP (ref 3.5–5.3)
PROT SERPL-MCNC: 6.5 G/DL — SIGNIFICANT CHANGE UP (ref 6–8.3)
PROTHROM AB SERPL-ACNC: 15.3 SEC — HIGH (ref 9.5–13)
RSV RNA NPH QL NAA+NON-PROBE: SIGNIFICANT CHANGE UP
SARS-COV-2 RNA SPEC QL NAA+PROBE: SIGNIFICANT CHANGE UP
SODIUM SERPL-SCNC: 142 MMOL/L — SIGNIFICANT CHANGE UP (ref 135–145)
TROPONIN T, HIGH SENSITIVITY RESULT: 12 NG/L — SIGNIFICANT CHANGE UP (ref 0–51)
TROPONIN T, HIGH SENSITIVITY RESULT: 9 NG/L — SIGNIFICANT CHANGE UP (ref 0–51)

## 2024-08-23 PROCEDURE — 93010 ELECTROCARDIOGRAM REPORT: CPT

## 2024-08-23 PROCEDURE — 92928 PRQ TCAT PLMT NTRAC ST 1 LES: CPT | Mod: LD

## 2024-08-23 PROCEDURE — 99223 1ST HOSP IP/OBS HIGH 75: CPT | Mod: GC

## 2024-08-23 PROCEDURE — 99223 1ST HOSP IP/OBS HIGH 75: CPT

## 2024-08-23 PROCEDURE — 93454 CORONARY ARTERY ANGIO S&I: CPT | Mod: 26,59

## 2024-08-23 PROCEDURE — 99152 MOD SED SAME PHYS/QHP 5/>YRS: CPT

## 2024-08-23 PROCEDURE — 71046 X-RAY EXAM CHEST 2 VIEWS: CPT | Mod: 26

## 2024-08-23 RX ORDER — MAGNESIUM, ALUMINUM HYDROXIDE 200-225/5
30 SUSPENSION, ORAL (FINAL DOSE FORM) ORAL ONCE
Refills: 0 | Status: COMPLETED | OUTPATIENT
Start: 2024-08-23 | End: 2024-08-23

## 2024-08-23 RX ORDER — ACETAMINOPHEN 325 MG/1
1000 TABLET ORAL ONCE
Refills: 0 | Status: COMPLETED | OUTPATIENT
Start: 2024-08-23 | End: 2024-08-23

## 2024-08-23 RX ORDER — ACETAMINOPHEN 325 MG/1
650 TABLET ORAL EVERY 6 HOURS
Refills: 0 | Status: DISCONTINUED | OUTPATIENT
Start: 2024-08-23 | End: 2024-08-24

## 2024-08-23 RX ORDER — OMEPRAZOLE 40 MG/1
0 CAPSULE, DELAYED RELEASE ORAL
Qty: 0 | Refills: 0 | DISCHARGE

## 2024-08-23 RX ORDER — SPIRONOLACTONE 25 MG/1
25 TABLET, FILM COATED ORAL DAILY
Refills: 0 | Status: DISCONTINUED | OUTPATIENT
Start: 2024-08-23 | End: 2024-08-24

## 2024-08-23 RX ORDER — VALSARTAN 40 MG/1
320 TABLET ORAL DAILY
Refills: 0 | Status: DISCONTINUED | OUTPATIENT
Start: 2024-08-23 | End: 2024-08-24

## 2024-08-23 RX ORDER — FAMOTIDINE 10 MG/ML
20 INJECTION INTRAVENOUS ONCE
Refills: 0 | Status: COMPLETED | OUTPATIENT
Start: 2024-08-23 | End: 2024-08-23

## 2024-08-23 RX ORDER — FENTANYL CITRATE 50 UG/ML
25 INJECTION INTRAMUSCULAR; INTRAVENOUS ONCE
Refills: 0 | Status: DISCONTINUED | OUTPATIENT
Start: 2024-08-23 | End: 2024-08-23

## 2024-08-23 RX ORDER — ISOSORBIDE MONONITRATE 30 MG/1
30 TABLET, EXTENDED RELEASE ORAL DAILY
Refills: 0 | Status: DISCONTINUED | OUTPATIENT
Start: 2024-08-23 | End: 2024-08-24

## 2024-08-23 RX ORDER — ROSUVASTATIN CALCIUM 10 MG/1
10 TABLET ORAL AT BEDTIME
Refills: 0 | Status: DISCONTINUED | OUTPATIENT
Start: 2024-08-23 | End: 2024-08-24

## 2024-08-23 RX ORDER — PANTOPRAZOLE SODIUM 40 MG
40 TABLET, DELAYED RELEASE (ENTERIC COATED) ORAL
Refills: 0 | Status: DISCONTINUED | OUTPATIENT
Start: 2024-08-23 | End: 2024-08-24

## 2024-08-23 RX ORDER — MIRABEGRON 50 MG/1
0 TABLET, FILM COATED, EXTENDED RELEASE ORAL
Qty: 0 | Refills: 0 | DISCHARGE

## 2024-08-23 RX ORDER — CARVEDILOL 6.25 MG/1
12.5 TABLET ORAL EVERY 12 HOURS
Refills: 0 | Status: DISCONTINUED | OUTPATIENT
Start: 2024-08-23 | End: 2024-08-24

## 2024-08-23 RX ORDER — OXYBUTYNIN CHLORIDE 5 MG/1
5 TABLET ORAL
Refills: 0 | Status: DISCONTINUED | OUTPATIENT
Start: 2024-08-23 | End: 2024-08-24

## 2024-08-23 RX ORDER — PANCRELIPASE 16000; 60500; 57500 [USP'U]/1; [USP'U]/1; [USP'U]/1
1 CAPSULE, DELAYED RELEASE ORAL DAILY
Refills: 0 | Status: DISCONTINUED | OUTPATIENT
Start: 2024-08-23 | End: 2024-08-23

## 2024-08-23 RX ORDER — ASPIRIN 81 MG
81 TABLET, DELAYED RELEASE (ENTERIC COATED) ORAL DAILY
Refills: 0 | Status: DISCONTINUED | OUTPATIENT
Start: 2024-08-24 | End: 2024-08-24

## 2024-08-23 RX ORDER — EZETIMIBE 10 MG/1
10 TABLET ORAL DAILY
Refills: 0 | Status: DISCONTINUED | OUTPATIENT
Start: 2024-08-23 | End: 2024-08-24

## 2024-08-23 RX ORDER — IBUPROFEN 600 MG
600 TABLET ORAL ONCE
Refills: 0 | Status: COMPLETED | OUTPATIENT
Start: 2024-08-23 | End: 2024-08-23

## 2024-08-23 RX ORDER — LEVOTHYROXINE SODIUM 100 MCG
50 TABLET ORAL DAILY
Refills: 0 | Status: DISCONTINUED | OUTPATIENT
Start: 2024-08-23 | End: 2024-08-24

## 2024-08-23 RX ORDER — PANCRELIPASE 16000; 60500; 57500 [USP'U]/1; [USP'U]/1; [USP'U]/1
1 CAPSULE, DELAYED RELEASE ORAL
Refills: 0 | Status: DISCONTINUED | OUTPATIENT
Start: 2024-08-23 | End: 2024-08-24

## 2024-08-23 RX ORDER — SODIUM CHLORIDE 9 MG/ML
1000 INJECTION INTRAMUSCULAR; INTRAVENOUS; SUBCUTANEOUS
Refills: 0 | Status: DISCONTINUED | OUTPATIENT
Start: 2024-08-23 | End: 2024-08-24

## 2024-08-23 RX ORDER — AMLODIPINE BESYLATE 10 MG/1
0 TABLET ORAL
Qty: 0 | Refills: 0 | DISCHARGE

## 2024-08-23 RX ORDER — CARVEDILOL 6.25 MG/1
6.25 TABLET ORAL ONCE
Refills: 0 | Status: COMPLETED | OUTPATIENT
Start: 2024-08-23 | End: 2024-08-23

## 2024-08-23 RX ORDER — AMLODIPINE BESYLATE 10 MG/1
5 TABLET ORAL DAILY
Refills: 0 | Status: DISCONTINUED | OUTPATIENT
Start: 2024-08-23 | End: 2024-08-24

## 2024-08-23 RX ORDER — ROPINIROLE 2 MG/1
1 TABLET, FILM COATED, EXTENDED RELEASE ORAL AT BEDTIME
Refills: 0 | Status: DISCONTINUED | OUTPATIENT
Start: 2024-08-23 | End: 2024-08-24

## 2024-08-23 RX ORDER — ACETAMINOPHEN 325 MG/1
650 TABLET ORAL ONCE
Refills: 0 | Status: COMPLETED | OUTPATIENT
Start: 2024-08-23 | End: 2024-08-23

## 2024-08-23 RX ORDER — CARVEDILOL 6.25 MG/1
6.25 TABLET ORAL EVERY 12 HOURS
Refills: 0 | Status: DISCONTINUED | OUTPATIENT
Start: 2024-08-23 | End: 2024-08-23

## 2024-08-23 RX ORDER — ESCITALOPRAM OXALATE 10 MG/1
0 TABLET ORAL
Qty: 0 | Refills: 0 | DISCHARGE

## 2024-08-23 RX ORDER — HYDRALAZINE HCL 50 MG
25 TABLET ORAL THREE TIMES A DAY
Refills: 0 | Status: DISCONTINUED | OUTPATIENT
Start: 2024-08-23 | End: 2024-08-24

## 2024-08-23 RX ORDER — RIVAROXABAN 10 MG/1
20 TABLET, FILM COATED ORAL
Refills: 0 | Status: DISCONTINUED | OUTPATIENT
Start: 2024-08-23 | End: 2024-08-23

## 2024-08-23 RX ORDER — AMLODIPINE BESYLATE 10 MG/1
2.5 TABLET ORAL DAILY
Refills: 0 | Status: DISCONTINUED | OUTPATIENT
Start: 2024-08-23 | End: 2024-08-23

## 2024-08-23 RX ORDER — SODIUM CHLORIDE 9 MG/ML
250 INJECTION INTRAMUSCULAR; INTRAVENOUS; SUBCUTANEOUS ONCE
Refills: 0 | Status: COMPLETED | OUTPATIENT
Start: 2024-08-23 | End: 2024-08-23

## 2024-08-23 RX ADMIN — FENTANYL CITRATE 25 MICROGRAM(S): 50 INJECTION INTRAMUSCULAR; INTRAVENOUS at 20:19

## 2024-08-23 RX ADMIN — OXYBUTYNIN CHLORIDE 5 MILLIGRAM(S): 5 TABLET ORAL at 17:27

## 2024-08-23 RX ADMIN — CARVEDILOL 6.25 MILLIGRAM(S): 6.25 TABLET ORAL at 17:27

## 2024-08-23 RX ADMIN — SODIUM CHLORIDE 750 MILLILITER(S): 9 INJECTION INTRAMUSCULAR; INTRAVENOUS; SUBCUTANEOUS at 15:25

## 2024-08-23 RX ADMIN — ROSUVASTATIN CALCIUM 10 MILLIGRAM(S): 10 TABLET ORAL at 22:46

## 2024-08-23 RX ADMIN — FENTANYL CITRATE 25 MICROGRAM(S): 50 INJECTION INTRAMUSCULAR; INTRAVENOUS at 20:49

## 2024-08-23 RX ADMIN — ACETAMINOPHEN 400 MILLIGRAM(S): 325 TABLET ORAL at 17:46

## 2024-08-23 RX ADMIN — FAMOTIDINE 20 MILLIGRAM(S): 10 INJECTION INTRAVENOUS at 02:10

## 2024-08-23 RX ADMIN — EZETIMIBE 10 MILLIGRAM(S): 10 TABLET ORAL at 22:46

## 2024-08-23 RX ADMIN — ACETAMINOPHEN 650 MILLIGRAM(S): 325 TABLET ORAL at 23:35

## 2024-08-23 RX ADMIN — Medication 30 MILLILITER(S): at 02:09

## 2024-08-23 RX ADMIN — CARVEDILOL 6.25 MILLIGRAM(S): 6.25 TABLET ORAL at 18:15

## 2024-08-23 RX ADMIN — ROPINIROLE 1 MILLIGRAM(S): 2 TABLET, FILM COATED, EXTENDED RELEASE ORAL at 22:46

## 2024-08-23 RX ADMIN — ACETAMINOPHEN 1000 MILLIGRAM(S): 325 TABLET ORAL at 18:05

## 2024-08-23 RX ADMIN — SODIUM CHLORIDE 75 MILLILITER(S): 9 INJECTION INTRAMUSCULAR; INTRAVENOUS; SUBCUTANEOUS at 18:07

## 2024-08-23 RX ADMIN — ISOSORBIDE MONONITRATE 30 MILLIGRAM(S): 30 TABLET, EXTENDED RELEASE ORAL at 13:04

## 2024-08-23 RX ADMIN — Medication 25 MILLIGRAM(S): at 13:05

## 2024-08-23 RX ADMIN — ACETAMINOPHEN 650 MILLIGRAM(S): 325 TABLET ORAL at 06:19

## 2024-08-23 RX ADMIN — Medication 25 MILLIGRAM(S): at 22:45

## 2024-08-23 NOTE — DISCHARGE NOTE PROVIDER - NSDCCPCAREPLAN_GEN_ALL_CORE_FT
PRINCIPAL DISCHARGE DIAGNOSIS  Diagnosis: Chest pain  Assessment and Plan of Treatment:      PRINCIPAL DISCHARGE DIAGNOSIS  Diagnosis: Chest pain  Assessment and Plan of Treatment: s/p HIRAM x2 to LAD via RRA  Low salt, low fat diet.   Weight management.   Take medications as prescribed.    No smoking.  Follow up appointments with your doctor(s)  as instructed.        SECONDARY DISCHARGE DIAGNOSES  Diagnosis: Encounter for cardiac rehabilitation  Assessment and Plan of Treatment: We have provided you with a prescription for cardiac rehab which is medically supervised exercise program for your heart and has been shown to improve the quantity and quality of life of people with heart disease like yours. You should attend cardiac rehab 3 times per week for 12 weeks. We have provided you with a list of nearby facilities. Please call your insurance carrier to determine which of these facilities are covered under your plan. Please bring this prescription with you to your follow up appointment with your cardiologist who can then further assist you to enroll into a cardiac rehab program.

## 2024-08-23 NOTE — H&P ADULT - NSHPREVIEWOFSYSTEMS_GEN_ALL_CORE
Review of Systems:   CONSTITUTIONAL: No fever, weight loss  EYES: No eye pain, visual disturbances, or discharge  ENMT:  No difficulty hearing, tinnitus, vertigo; No sinus or throat pain  RESPIRATORY: No SOB. No cough, wheezing, chills or hemoptysis  CARDIOVASCULAR: (+) chest pain, no palpitations, dizziness, or leg swelling  GASTROINTESTINAL: No abdominal or epigastric pain. No nausea, vomiting, or hematemesis; No diarrhea or constipation. No melena or hematochezia.  GENITOURINARY: No dysuria, frequency, hematuria, or incontinence  NEUROLOGICAL: (+) headaches, no lightheadedness, numbness, or tremors  SKIN: No itching, burning, rashes, or lesions   MUSCULOSKELETAL: No joint pain or swelling; No muscle, back pain  HEME/LYMPH: No easy bruising, or bleeding gums

## 2024-08-23 NOTE — H&P ADULT - NSHPLABSRESULTS_GEN_ALL_CORE
LABS:                         14.5   9.52  )-----------( 182      ( 22 Aug 2024 23:46 )             44.0     08-22    142  |  107  |  18  ----------------------------<  96  3.7   |  26  |  1.09    Ca    9.9      22 Aug 2024 23:46    TPro  6.5  /  Alb  3.8  /  TBili  0.3  /  DBili  x   /  AST  15  /  ALT  15  /  AlkPhos  76  08-22    PT/INR - ( 22 Aug 2024 23:46 )   PT: 15.3 sec;   INR: 1.47 ratio         PTT - ( 22 Aug 2024 23:46 )  PTT:37.9 sec  Urinalysis Basic - ( 22 Aug 2024 23:46 )    Color: x / Appearance: x / SG: x / pH: x  Gluc: 96 mg/dL / Ketone: x  / Bili: x / Urobili: x   Blood: x / Protein: x / Nitrite: x   Leuk Esterase: x / RBC: x / WBC x   Sq Epi: x / Non Sq Epi: x / Bacteria: x              Records reviewed from prior hospitalization.  Labs reviewed remarkable for   EKG personally reviewed   CXR personally reviewed

## 2024-08-23 NOTE — ED ADULT NURSE NOTE - OBJECTIVE STATEMENT
Patient is a 69 year old male complaining of chest pain, Patient reports central/left-sided chest pain for the past day described as  central/left-sided, burning/aching, radiating to left shoulder and left upper arm, started at 2 PM, at rest, gradually resolving associated with lightheadedness, shortness of breath, nausea, On assessment patient is A&Ox4, breathing comfortably on room air, no accessory muscle use, no cough, chest rise and fall equal, NSR on the cardiac monitor, no JVD, no edema noted, strong bilateral peripheral pulses, abdomen soft nontender, skin warm and normal for race. PMH asthma/COPD, ischemic heart disease status post stent, atrial fibrillation status post ablation in 2023, hypertension, hyperlipidemia, status post prostatectomy.

## 2024-08-23 NOTE — CONSULT NOTE ADULT - ATTENDING COMMENTS
69 year old man with coronary stent years ago, atrial fibrillation ablation, but maintained on rivaroxaban presents with sudden onset substernal chest discomfort last night, which has diminished, but not resolved. Exam unremarkable, EKG without ischemic changes. troponin negative. Place on DAPT and do not give rivaroxaban today as consider early coronary angiography.    To contact call Cardiology Fellow or Attending as listed on [email or web address] password: [email or web address].

## 2024-08-23 NOTE — CONSULT NOTE ADULT - SUBJECTIVE AND OBJECTIVE BOX
CARDIOLOGY FELLOW CONSULT NOTE    HPI:  69y.o. male w/PMHx of CAD s/p PCI, AFib s/p ablation on Xarelto, HLD, asthma/COPD, prostatectomy p/w chest pain. Patient speaks some English/Zimbabwean - sister-in-law on phone providing translation. Expresses frustration over time in ED; declines interpeter services. Patient states around 6 PM last night he developed sudden-onset central-L sided chest pain radiating to LUE and hands. Denies any known triggers; states it started while at rest. Has had similar chest pain in the past, but states never this severe. States he saw his cardiologist at the beginning of the week; had his BP medications adjusted but denies any other changes. Reports his chest pain resolved this AM spontaneously while in ED. Denies any f/c, recent sore throat cough, SOB, palpitations, abd pain, N/V/D, lightheadedness or leg edema. Does c/o HA, noting he has not had anything to eat/drink since being in ED.       VSS. ECG NSR w/ 1st degree block, TWI in V5-6 (present in ). Trops negative. CXR w/ bibasilar atelectasis. GIven maalox and famotidine, and ibuprofen b/c patient complaining of burning belly pain and HA in ED, which improved belly pain but patient still has chest pain. Patient had EGD this year which was unremarkable. ADmit for possible mod-high-risk chest pain, no beds in CDU. (23 Aug 2024 12:05)        PMHx:   CAD (Coronary Atherosclerotic Disease)    HTN (Hypertension)    Asthma    GERD (Gastroesophageal Reflux Disease)    Dyslipidemia    Obstructive Sleep Apnea    History of prostate cancer    GI bleed    Insomnia    Prostate cancer    Impotence sexual    Stress incontinence, male    MI (myocardial infarction)    BPH (Benign Prostatic Hyperplasia)    Male urinary stress incontinence    Sleep apnea    Cardiac abnormality    Prostate cancer    Hyperlipidemia    Hypertension    Gastroesophageal reflux disease    COPD (chronic obstructive pulmonary disease)    IBS (irritable bowel syndrome)    Urinary incontinence        PSHx:   S/P UPPP (Uvulopalatopharyngoplasty)    s/p greenlight prostate  surgery 2010    S/P colonoscopy    S/P prostatectomy    H/O prostatectomy    S/P uvulopalatopharyngoplasty    Penile abnormality    Stented coronary artery        Allergies:  No Known Allergies      Home Meds:    Current Medications:   acetaminophen     Tablet .. 650 milliGRAM(s) Oral every 6 hours PRN  amLODIPine   Tablet 2.5 milliGRAM(s) Oral daily  carvedilol 6.25 milliGRAM(s) Oral every 12 hours  ezetimibe 10 milliGRAM(s) Oral daily  hydrALAZINE 25 milliGRAM(s) Oral three times a day  isosorbide   mononitrate ER Tablet (IMDUR) 30 milliGRAM(s) Oral daily  levothyroxine 50 MICROGram(s) Oral daily  oxybutynin 5 milliGRAM(s) Oral two times a day  pancrelipase  (CREON 36,000 Lipase Units) 1 Capsule(s) Oral three times a day with meals  pantoprazole    Tablet 40 milliGRAM(s) Oral before breakfast  rivaroxaban 20 milliGRAM(s) Oral with dinner  rOPINIRole 1 milliGRAM(s) Oral at bedtime  rosuvastatin 10 milliGRAM(s) Oral at bedtime  spironolactone 25 milliGRAM(s) Oral daily  valsartan 320 milliGRAM(s) Oral daily      FAMILY HISTORY:  Family history of hypertension (Sibling)  father  62y/o of htn, MI, mother hx htn, 1 brother htn    History of hypertension (Sibling)        Social History:  Smoking History:  Alcohol Use:  Drug Use:    REVIEW OF SYSTEMS:  CONSTITUTIONAL: No weakness, fevers or chills  EYES/ENT: No visual changes;  No dysphagia  NECK: No pain or stiffness  RESPIRATORY: No cough, wheezing, hemoptysis; No shortness of breath  CARDIOVASCULAR: No chest pain or palpitations; No lower extremity edema  GASTROINTESTINAL: No abdominal or epigastric pain. No nausea, vomiting, or hematemesis; No diarrhea or constipation. No melena or hematochezia.  BACK: No back pain  GENITOURINARY: No dysuria, frequency or hematuria  NEUROLOGICAL: No numbness or weakness  SKIN: No itching, burning, rashes, or lesions   All other review of systems is negative unless indicated above.    Physical Exam:  T(F): 98.3 (-), Max: 98.5 (-)  HR: 72 (-) (60 - 78)  BP: 144/82 (-) (133/82 - 169/94)  RR: 18 (-)  SpO2: 98% ()  GEN: comfortable appearing, lying in bed in NAD  HEENT: NCAT, MMM  CV: Regular S1, S2, no m/r/g  RESP: CTAB  ABD: Soft, NTND, +BS  EXT: No LE edema, WWP, pulses palpable throughout  NEURO: No focal deficits, AOx3  SKIN:  No rashes    Labs: Personally reviewed                        14.5   9.52  )-----------( 182      ( 22 Aug 2024 23:46 )             44.0         142  |  107  |  18  ----------------------------<  96  3.7   |  26  |  1.09    Ca    9.9      22 Aug 2024 23:46    TPro  6.5  /  Alb  3.8  /  TBili  0.3  /  DBili  x   /  AST  15  /  ALT  15  /  AlkPhos  76  08    PT/INR - ( 22 Aug 2024 23:46 )   PT: 15.3 sec;   INR: 1.47 ratio         PTT - ( 22 Aug 2024 23:46 )  PTT:37.9 sec    CARDIAC MARKERS ( 23 Aug 2024 02:43 )  9 ng/L / x     / x     / x     / x     / x      CARDIAC MARKERS ( 22 Aug 2024 23:46 )  12 ng/L / x     / x     / x     / x     / x          EKG: Personally Reviewed    Echo: Personally Reviewed    Stress Test: Personally Reviewed    Cath Report: [facility]  will get records     Imaging: Personally Reviewed    Interpretation of Telemetry     CARDIOLOGY FELLOW CONSULT NOTE    HPI:  69y.o. male w/PMHx of CAD s/p PCI, AFib s/p ablation on Xarelto, HLD, asthma/COPD, prostatectomy p/w chest pain. Patient speaks some English/Guatemalan - sister-in-law on phone providing translation. Expresses frustration over time in ED; declines interpeter services. Patient states around 6 PM last night he developed sudden-onset central-L sided chest pain radiating to LUE and hands. Denies any known triggers; states it started while at rest. Has had similar chest pain in the past, but states never this severe. States he saw his cardiologist at the beginning of the week; had his BP medications adjusted but denies any other changes. Reports his chest pain resolved this AM spontaneously while in ED. Denies any f/c, recent sore throat cough, SOB, palpitations, abd pain, N/V/D, lightheadedness or leg edema. Does c/o HA, noting he has not had anything to eat/drink since being in ED.       VSS. ECG NSR w/ 1st degree block, TWI in V5-6 (present in ). Trops negative. CXR w/ bibasilar atelectasis. GIven maalox and famotidine, and ibuprofen b/c patient complaining of burning belly pain and HA in ED, which improved belly pain but patient still has chest pain. Patient had EGD this year which was unremarkable. Admit for possible mod-high-risk chest pain, no beds in CDU. (23 Aug 2024 12:05)    Patients chest pain has not returned on evaluation today, He complains of headache which has not improved, has chronic Hx of migraines. Further info states after his ablation two years ago he was told to stop Plavix and only continue with Xarelto.     PMHx:   CAD (Coronary Atherosclerotic Disease)  HTN (Hypertension)  Asthma  GERD (Gastroesophageal Reflux Disease)  Dyslipidemia  Obstructive Sleep Apnea  History of prostate cancer  GI bleed  Insomnia  Prostate cancer  Impotence sexual  Stress incontinence, male  MI (myocardial infarction)  BPH (Benign Prostatic Hyperplasia)  Male urinary stress incontinence  Sleep apnea  Hx Prostate cancer  Hyperlipidemia  Hypertension  Gastroesophageal reflux disease  COPD (chronic obstructive pulmonary disease)  IBS (irritable bowel syndrome)  Urinary incontinence        PSHx:   S/P UPPP (Uvulopalatopharyngoplasty)  s/p greenlight prostate  surgery 2010  S/P colonoscopy  S/P prostatectomy  S/P uvulopalatopharyngoplasty  S/P penile implant insertion    Allergies:  No Known Allergies    Home Meds:  see med rec, 23 meds.     Current Medications:   acetaminophen     Tablet .. 650 milliGRAM(s) Oral every 6 hours PRN  amLODIPine   Tablet 2.5 milliGRAM(s) Oral daily  carvedilol 6.25 milliGRAM(s) Oral every 12 hours  ezetimibe 10 milliGRAM(s) Oral daily  hydrALAZINE 25 milliGRAM(s) Oral three times a day  isosorbide   mononitrate ER Tablet (IMDUR) 30 milliGRAM(s) Oral daily  levothyroxine 50 MICROGram(s) Oral daily  oxybutynin 5 milliGRAM(s) Oral two times a day  pancrelipase  (CREON 36,000 Lipase Units) 1 Capsule(s) Oral three times a day with meals  pantoprazole    Tablet 40 milliGRAM(s) Oral before breakfast  rivaroxaban 20 milliGRAM(s) Oral with dinner  rOPINIRole 1 milliGRAM(s) Oral at bedtime  rosuvastatin 10 milliGRAM(s) Oral at bedtime  spironolactone 25 milliGRAM(s) Oral daily  valsartan 320 milliGRAM(s) Oral daily      FAMILY HISTORY:  Family history of hypertension (Sibling)  father  62y/o of htn, MI, mother hx htn, 1 brother htn    History of hypertension (Sibling)    Social History: lives with family, adult sons and daughters  Smoking History: quit    Alcohol Use: states less than 5 drinks a week   Drug Use: denies     REVIEW OF SYSTEMS:  CONSTITUTIONAL: No weakness, fevers or chills  EYES/ENT: No visual changes;  No dysphagia  NECK: No pain or stiffness  RESPIRATORY: No cough, wheezing, hemoptysis; No shortness of breath  CARDIOVASCULAR: No chest pain or palpitations; No lower extremity edema  GASTROINTESTINAL: No abdominal or epigastric pain. No nausea, vomiting, or hematemesis; No diarrhea or constipation. No melena or hematochezia.  BACK: No back pain  GENITOURINARY: No dysuria, frequency or hematuria  NEUROLOGICAL: No numbness or weakness (+headache)  SKIN: No itching, burning, rashes, or lesions   All other review of systems is negative unless indicated above.    Physical Exam:  T(F): 98.3 (-), Max: 98.5 ()  HR: 72 () (60 - 78)  BP: 144/82 () (133/82 - 169/94)  RR: 18 (-)  SpO2: 98% ()  GEN: comfortable appearing, lying in bed comfortably   HEENT: NCAT, MMM, thick neck   CV: Regular S1, S2, no m/r/g  RESP: Diminished breath sounds BL worse at bases, on RA laying flat   ABD: Soft, NTND, +BS  EXT: No LE edema, WWP, pulses palpable throughout  NEURO: No focal deficits, AOx3  SKIN:  No rashes    Labs: Personally reviewed                        14.5   9.52  )-----------( 182      ( 22 Aug 2024 23:46 )             44.0         142  |  107  |  18  ----------------------------<  96  3.7   |  26  |  1.09    Ca    9.9      22 Aug 2024 23:46    TPro  6.5  /  Alb  3.8  /  TBili  0.3  /  DBili  x   /  AST  15  /  ALT  15  /  AlkPhos  76  08    PT/INR - ( 22 Aug 2024 23:46 )   PT: 15.3 sec;   INR: 1.47 ratio         PTT - ( 22 Aug 2024 23:46 )  PTT:37.9 sec    CARDIAC MARKERS ( 23 Aug 2024 02:43 )  9 ng/L / x     / x     / x     / x     / x      CARDIAC MARKERS ( 22 Aug 2024 23:46 )  12 ng/L / x     / x     / x     / x     / x          EKG: Personally Reviewe  Echo: Personally Reviewed  Stress Test: Personally Reviewed  Cath Report: [facility] 2019 will get records   Imaging: Personally Reviewed  Interpretation of Telemetry    NSR

## 2024-08-23 NOTE — DISCHARGE NOTE PROVIDER - NSDCMRMEDTOKEN_GEN_ALL_CORE_FT
amLODIPine 2.5 mg oral tablet: 1 tab(s) orally  AZITHROMYCIN 250MG TAB:   CARVEDILOL 6.25MG TAB:   CREON [id number] CAP:   DEXLANSOPRAZ 60MG DR CAP:   DICLOFENAC 2% PUMP SOL:   EZETIMIBE 10MG TAB:   HYDRALAZINE 25MG TAB:   HYDROCORTISO 2.5% CRE:   IBSRELA 50MG TAB:   ISOSORB MONO 30MG ER TAB:   NAMZARIC 28-10MG CAP:   PRALUENT 2PK 150MG/ML PEN:   QULIPTA 60MG TAB:   ROPINIROLE 1MG TAB:   ROSUVASTATIN 10MG TAB:   SPIRONOLACT 25MG TAB:   SYNTHROID 50MCG TAB:   TROSPIUM CL 20MG TAB:   TRULANCE 3MG TAB:   UBRELVY 100MG TAB:   VALSARTAN 320MG TAB:   XARELTO 20MG TAB:    amLODIPine 2.5 mg oral tablet: 1 tab(s) orally  aspirin 81 mg oral delayed release tablet: 1 tab(s) orally once a day Take for 7 days ONLY, then stop  AZITHROMYCIN 250MG TAB:   Cardiac Rehabilitation: 2-3 sessions per week for 12 weeks, dx PCI  CARVEDILOL 6.25MG TAB:   clopidogrel 75 mg oral tablet: 1 tab(s) orally once a day  CREON [id number] CAP:   DICLOFENAC 2% PUMP SOL:   EZETIMIBE 10MG TAB:   HYDRALAZINE 25MG TAB:   HYDROCORTISO 2.5% CRE:   IBSRELA 50MG TAB:   ISOSORB MONO 30MG ER TAB:   NAMZARIC 28-10MG CAP:   pantoprazole 40 mg oral delayed release tablet: 1 tab(s) orally once a day (before a meal)  PRALUENT 2PK 150MG/ML PEN:   QULIPTA 60MG TAB:   ROPINIROLE 1MG TAB:   ROSUVASTATIN 10MG TAB:   SPIRONOLACT 25MG TAB:   SYNTHROID 50MCG TAB:   TROSPIUM CL 20MG TAB:   TRULANCE 3MG TAB:   UBRELVY 100MG TAB:   VALSARTAN 320MG TAB:   XARELTO 20MG TAB:    amLODIPine 10 mg oral tablet: 1 tab(s) orally once a day  aspirin 81 mg oral delayed release tablet: 1 tab(s) orally once a day Take for 7 days ONLY, then stop  carvedilol 12.5 mg oral tablet: 1 tab(s) orally every 12 hours  clopidogrel 75 mg oral tablet: 1 tab(s) orally once a day  ezetimibe 10 mg oral tablet: 1 tab(s) orally once a day  hydrALAZINE 25 mg oral tablet: 1 tab(s) orally 3 times a day  isosorbide mononitrate 30 mg oral tablet, extended release: 1 tab(s) orally once a day  levothyroxine 50 mcg (0.05 mg) oral tablet: 1 tab(s) orally once a day  Namzaric 28 mg-10 mg oral capsule, extended release: 1 cap(s) orally once a day  pancrelipase 36,000 units-114,000 units-180,000 units oral delayed release capsule: 1 cap(s) orally 3 times a day (with meals)  pantoprazole 40 mg oral delayed release tablet: 1 tab(s) orally once a day (before a meal)  PRALUENT 2PK 150MG/ML PEN:   rOPINIRole 1 mg oral tablet: 1 tab(s) orally once a day (at bedtime)  rosuvastatin 10 mg oral tablet: 1 tab(s) orally once a day (at bedtime)  spironolactone 25 mg oral tablet: 1 tab(s) orally once a day  trospium 20 mg oral tablet: 1 tab(s) orally once a day  valsartan 320 mg oral tablet: 1 tab(s) orally once a day  Xarelto 20 mg oral tablet: 1 tab(s) orally once a day (at bedtime) can resume 8/24   amLODIPine 2.5 mg oral tablet: 1 tab(s) orally once a day  aspirin 81 mg oral delayed release tablet: 1 tab(s) orally once a day Take for 7 days ONLY, then stop  carvedilol 12.5 mg oral tablet: 1 tab(s) orally every 12 hours  clopidogrel 75 mg oral tablet: 1 tab(s) orally once a day  ezetimibe 10 mg oral tablet: 1 tab(s) orally once a day  hydrALAZINE 25 mg oral tablet: 1 tab(s) orally 3 times a day  isosorbide mononitrate 30 mg oral tablet, extended release: 1 tab(s) orally once a day  levothyroxine 50 mcg (0.05 mg) oral tablet: 1 tab(s) orally once a day  Namzaric 28 mg-10 mg oral capsule, extended release: 1 cap(s) orally once a day  pancrelipase 36,000 units-114,000 units-180,000 units oral delayed release capsule: 1 cap(s) orally 3 times a day (with meals)  pantoprazole 40 mg oral delayed release tablet: 1 tab(s) orally once a day (before a meal)  PRALUENT 2PK 150MG/ML PEN:   rOPINIRole 1 mg oral tablet: 1 tab(s) orally once a day (at bedtime)  rosuvastatin 10 mg oral tablet: 1 tab(s) orally once a day (at bedtime)  spironolactone 25 mg oral tablet: 1 tab(s) orally once a day  trospium 20 mg oral tablet: 1 tab(s) orally once a day  valsartan 320 mg oral tablet: 1 tab(s) orally once a day  Xarelto 20 mg oral tablet: 1 tab(s) orally once a day (at bedtime) can resume 8/24   amLODIPine 2.5 mg oral tablet: 1 tab(s) orally once a day  aspirin 81 mg oral delayed release tablet: 1 tab(s) orally once a day Take for 7 days ONLY, then stop  carvedilol 12.5 mg oral tablet: 1 tab(s) orally every 12 hours  clopidogrel 75 mg oral tablet: 1 tab(s) orally once a day  Coreg 12.5 mg oral tablet: 1 tab(s) orally once a day  ezetimibe 10 mg oral tablet: 1 tab(s) orally once a day  hydrALAZINE 25 mg oral tablet: 1 tab(s) orally 3 times a day  isosorbide mononitrate 30 mg oral tablet, extended release: 1 tab(s) orally once a day  levothyroxine 50 mcg (0.05 mg) oral tablet: 1 tab(s) orally once a day  Namzaric 28 mg-10 mg oral capsule, extended release: 1 cap(s) orally once a day  pancrelipase 36,000 units-114,000 units-180,000 units oral delayed release capsule: 1 cap(s) orally 3 times a day (with meals)  pantoprazole 40 mg oral delayed release tablet: 1 tab(s) orally once a day (before a meal)  Plavix 75 mg oral tablet: 1 tab(s) orally once a day  PRALUENT 2PK 150MG/ML PEN:   rOPINIRole 1 mg oral tablet: 1 tab(s) orally once a day (at bedtime)  rosuvastatin 10 mg oral tablet: 1 tab(s) orally once a day (at bedtime)  spironolactone 25 mg oral tablet: 1 tab(s) orally once a day  trospium 20 mg oral tablet: 1 tab(s) orally once a day  valsartan 320 mg oral tablet: 1 tab(s) orally once a day  Xarelto 20 mg oral tablet: 1 tab(s) orally once a day (at bedtime) can resume 8/24

## 2024-08-23 NOTE — H&P ADULT - HISTORY OF PRESENT ILLNESS
69y.o. male w/PMHx of CAD s/p PCI, AFib s/p ablation on Xarelto, HLD, asthma/COPD, prostatectomy p/w chest pain. Patient speaks some English/[place]ian - sister-in-law on phone providing translation. Expresses frustration over time in ED; declines interpeter services. Patient states around 6 PM last night he developed sudden-onset central-L sided chest pain radiating to LUE and hands. Denies any known triggers; states it started while at rest. Has had similar chest pain in the past, but states never this severe. States he saw his cardiologist at the beginning of the week; had his BP medications adjusted but denies any other changes. Reports his chest pain resolved this AM spontaneously while in ED. Denies any f/c, recent sore throat cough, SOB, palpitations, abd pain, N/V/D, lightheadedness or leg edema. Does c/o HA, noting he has not had anything to eat/drink since being in ED.       VSS. ECG NSR w/ 1st degree block, TWI in V5-6 (present in 2023). Trops negative. CXR w/ bibasilar atelectasis. GIven maalox and famotidine, and ibuprofen b/c patient complaining of burning belly pain and HA in ED, which improved belly pain but patient still has chest pain. Patient had EGD this year which was unremarkable. ADmit for possible mod-high-risk chest pain, no beds in CDU.
Pupils equal, round and reactive to light, Extra-ocular movement intact, eyes are clear b/l

## 2024-08-23 NOTE — DISCHARGE NOTE PROVIDER - CARE PROVIDER_API CALL
Anselmo Banuelos  Cardiovascular Disease  [address]  [place] [address]  Phone: [phone]  Fax: [phone]  Follow Up Time: 2 weeks   Feliciano Ibrahim  Cardiology  [address] [place] [address]  Phone: [phone]  Fax: [phone]  Follow Up Time: 2 weeks

## 2024-08-23 NOTE — DISCHARGE NOTE PROVIDER - HOSPITAL COURSE
HPI:  69y.o. male w/PMHx of CAD s/p PCI, AFib s/p ablation on Xarelto, HLD, asthma/COPD, prostatectomy p/w chest pain. Patient speaks some English/Tajik - sister-in-law on phone providing translation. Expresses frustration over time in ED; declines interpeter services. Patient states around 6 PM last night he developed sudden-onset central-L sided chest pain radiating to LUE and hands. Denies any known triggers; states it started while at rest. Has had similar chest pain in the past, but states never this severe. States he saw his cardiologist at the beginning of the week; had his BP medications adjusted but denies any other changes. Reports his chest pain resolved this AM spontaneously while in ED. Denies any f/c, recent sore throat cough, SOB, palpitations, abd pain, N/V/D, lightheadedness or leg edema. Does c/o HA, noting he has not had anything to eat/drink since being in ED.       VSS. ECG NSR w/ 1st degree block, TWI in V5-6 (present in 2023). Trops negative. CXR w/ bibasilar atelectasis. GIven maalox and famotidine, and ibuprofen b/c patient complaining of burning belly pain and HA in ED, which improved belly pain but patient still has chest pain. Patient had EGD this year which was unremarkable. ADmit for possible mod-high-risk chest pain, no beds in CDU. (23 Aug 2024 12:05)  s/p PCI/HIRAM x 2 to LAD via RRA   Site benign - no hematoma or bleeding  ASA/Plavix and Xarelto for 1 week, then drop ASA   Can resume Xarelto 8/24 PM   TTE scheduled HPI: 69y.o. male w/PMHx of CAD s/p PCI, AFib s/p ablation on Xarelto, HLD, asthma/COPD, prostatectomy p/w chest pain. Patient speaks some English/Malagasy - sister-in-law on phone providing translation. Expresses frustration over time in ED; declines interpeter services. Patient states around 6 PM last night he developed sudden-onset central-L sided chest pain radiating to LUE and hands. Denies any known triggers; states it started while at rest. Has had similar chest pain in the past, but states never this severe. States he saw his cardiologist at the beginning of the week; had his BP medications adjusted but denies any other changes. Reports his chest pain resolved this AM spontaneously while in ED. Denies any f/c, recent sore throat cough, SOB, palpitations, abd pain, N/V/D, lightheadedness or leg edema. Does c/o HA, noting he has not had anything to eat/drink since being in ED.       VSS. ECG NSR w/ 1st degree block, TWI in V5-6 (present in 2023). Trops negative. CXR w/ bibasilar atelectasis. GIven maalox and famotidine, and ibuprofen b/c patient complaining of burning belly pain and HA in ED, which improved belly pain but patient still has chest pain. Patient had EGD this year which was unremarkable. ADmit for possible mod-high-risk chest pain, no beds in CDU. (23 Aug 2024 12:05)  s/p PCI/HIRAM x 2 to LAD via RRA   Site benign - no hematoma or bleeding  ASA/Plavix and Xarelto for 1 week, then drop ASA   Can resume Xarelto 8/24 PM   TTE scheduled     Cardiac Rehab (STEMI/NSTEMI/ACS/Unstable Angina/CHF/Chronic Stable Angina/Heart Surgery (CABG,Valve)/Post PCI):              *Education on benefits of Cardiac Rehab provided to patient: Yes         *Referral and Prescription Given for Cardiac Rehab : Yes         *Pt given list of locations & instructed to contact their insurance company to review list of participating providers: Yes         *Pt instructed to bring Cardiac Rehab prescription with them to Cardiology Follow up appointment for assistance with enrollment: Yes         *Pt discharged with copies detail cardiovascular history, medications, testing/treatments: Yes HPI: 69y.o. male w/PMHx of CAD s/p PCI, AFib s/p ablation on Xarelto, HLD, asthma/COPD, prostatectomy p/w chest pain. Patient speaks some English/Ecuadorean - sister-in-law on phone providing translation. Expresses frustration over time in ED; declines interpeter services. Patient states around 6 PM last night he developed sudden-onset central-L sided chest pain radiating to LUE and hands. Denies any known triggers; states it started while at rest. Has had similar chest pain in the past, but states never this severe. States he saw his cardiologist at the beginning of the week; had his BP medications adjusted but denies any other changes. Reports his chest pain resolved this AM spontaneously while in ED. Denies any f/c, recent sore throat cough, SOB, palpitations, abd pain, N/V/D, lightheadedness or leg edema. Does c/o HA, noting he has not had anything to eat/drink since being in ED.       VSS. ECG NSR w/ 1st degree block, TWI in V5-6 (present in 2023). Trops negative. CXR w/ bibasilar atelectasis. GIven maalox and famotidine, and ibuprofen b/c patient complaining of burning belly pain and HA in ED, which improved belly pain but patient still has chest pain. Patient had EGD this year which was unremarkable. ADmit for possible mod-high-risk chest pain, no beds in CDU. (23 Aug 2024 12:05)  8/24/24: s/p PCI/HIRAM x 2 to LAD via RRA Site benign - no hematoma or bleeding. Patient ambulated today tolerated well. Medications adjusted as per Dr Yee. Patient informed to follow up with his cardiologist within 2 weeks. ASA/Plavix and Xarelto for 1 week, then drop ASA, will resume Xarelto 8/24 PM. Post PCI TTE- images reviewed by Dr Yee, EF looks appropiate. Patient stable for discharge today.      Cardiac Rehab (STEMI/NSTEMI/ACS/Unstable Angina/CHF/Chronic Stable Angina/Heart Surgery (CABG,Valve)/Post PCI):              *Education on benefits of Cardiac Rehab provided to patient: Yes         *Referral and Prescription Given for Cardiac Rehab : Yes         *Pt given list of locations & instructed to contact their insurance company to review list of participating providers: Yes         *Pt instructed to bring Cardiac Rehab prescription with them to Cardiology Follow up appointment for assistance with enrollment: Yes         *Pt discharged with copies detail cardiovascular history, medications, testing/treatments: Yes HPI:   The patient is a 69M with h/o CAD s/p PCI, AFib s/p ablation on Xarelto, HLD, asthma/COPD, prostatectomy p/w chest pain. Patient speaks some English/Honduran - sister-in-law on phone providing translation. Expresses frustration over time in ED; declines interpeter services. Patient states around 6 PM last night he developed sudden-onset central-L sided chest pain radiating to LUE and hands. Denies any known triggers; states it started while at rest. Has had similar chest pain in the past, but states never this severe. States he saw his cardiologist at the beginning of the week; had his BP medications adjusted but denies any other changes. Reports his chest pain resolved this AM spontaneously while in ED. Denies any f/c, recent sore throat cough, SOB, palpitations, abd pain, N/V/D, lightheadedness or leg edema. Does c/o HA, noting he has not had anything to eat/drink since being in ED.     VSS. ECG NSR w/ 1st degree block, TWI in V5-6 (present in 2023). Trops negative. CXR w/ bibasilar atelectasis. GIven maalox and famotidine, and ibuprofen b/c patient complaining of burning belly pain and HA in ED, which improved belly pain but patient still has chest pain. Patient had EGD this year which was unremarkable. ADmit for possible mod-high-risk chest pain, no beds in CDU. (23 Aug 2024 12:05)    He underwent LHC on 8/24/24: s/p PCI/HIRAM x 2 to LAD via RRA Site benign - no hematoma or bleeding. Patient ambulated today tolerated well. Medications adjusted as per Dr Yee. Patient informed to follow up with his cardiologist within 2 weeks. ASA/Plavix and Xarelto for 1 week, then drop ASA, will resume Xarelto 8/24 PM. Post PCI TTE- images reviewed by Dr Yee, EF looks appropiate. Patient stable for discharge.      Cardiac Rehab (STEMI/NSTEMI/ACS/Unstable Angina/CHF/Chronic Stable Angina/Heart Surgery (CABG,Valve)/Post PCI):              *Education on benefits of Cardiac Rehab provided to patient: Yes         *Referral and Prescription Given for Cardiac Rehab : Yes         *Pt given list of locations & instructed to contact their insurance company to review list of participating providers: Yes         *Pt instructed to bring Cardiac Rehab prescription with them to Cardiology Follow up appointment for assistance with enrollment: Yes         *Pt discharged with copies detail cardiovascular history, medications, testing/treatments: Yes

## 2024-08-23 NOTE — DISCHARGE NOTE PROVIDER - CARE PROVIDERS DIRECT ADDRESSES
,[email or web address]

## 2024-08-23 NOTE — PATIENT PROFILE ADULT - NSPROPTRIGHTREPPHONE_GEN_A_NUR
Rcvd. In angio via stretcher prior to drain exchange. Patient states her drain \"fell out last night\"   Dr. Christen Henning made aware. Will plan for drain placement with CT guidance. Anesthesia at bedside to evaluate patient. Procedure explained to patient by MD. Saintclair Simmering understanding of same. Awaiting anesthesia availability. [phone]

## 2024-08-23 NOTE — H&P ADULT - ASSESSMENT
69y.o. Malawian-speaking male w/PMHx of CAD s/p PCI, AFib s/p ablation on Xarelto, HLD, asthma/COPD, prostatectomy p/w chest pain.

## 2024-08-23 NOTE — CONSULT NOTE ADULT - ASSESSMENT
75M known CAD with UA equivalent KERWIN 4, unclear why hes not on SAPT and just DOAC.    Recommendations:          Thank you for the consult,    Jamie Santana MD  Cardiology Resident  Available on teams    All notes pending attestation  75M known CAD with UA equivalent KERWIN 4, Was stopped off anti-platelet agents in 2022 after his ablation for atrial fibrillation. This presentation of chest pressure is concerning of Unstable Angina, he currently has no pain right now.     Recommendations:  -Start DAPT  -High intensity statin therapy   -TTE   -ccm  -obtain additional troponin, the last lab set was hemolyzed, lipids, A1c, TSH       Thank you for the consult,    Jamie Santana MD  Cardiology Resident  Available on teams    All notes pending attestation  75M known CAD with UA equivalent KERWIN 4, Was stopped off anti-platelet agents in 2022 after his ablation for atrial fibrillation. This presentation of chest pressure is concerning of Unstable Angina, he currently has no pain right now.     Recommendations:  -Start DAPT  -High intensity statin therapy   -TTE   -ccm  -obtain additional troponin, the last lab set was hemolyzed, lipids, A1c, TSH   -would stop DOAC for Lovenox or Heparin with anticipation for C     Thank you for the consult,    Jamie Santana MD  Cardiology Resident  Available on teams    All notes pending attestation

## 2024-08-23 NOTE — H&P ADULT - PROBLEM SELECTOR PLAN 4
- DVT ppx: home Xarelto  - Diet: NPO awaiting cardiology evaluation  - Full Code  - Discussed with ACP

## 2024-08-23 NOTE — H&P ADULT - PROBLEM SELECTOR PLAN 2
- S/p ablation 3/2021  - Subsequent monitoring (preventice, holter) with no further A-fib, some PACs, few beats of SVT  - C/w Xarelto 20 mg daily

## 2024-08-23 NOTE — H&P ADULT - NSHPPHYSICALEXAM_GEN_ALL_CORE
Vital Signs Last 24 Hrs  T(C): 36.8 (23 Aug 2024 11:41), Max: 36.9 (22 Aug 2024 21:11)  T(F): 98.3 (23 Aug 2024 11:41), Max: 98.5 (22 Aug 2024 21:11)  HR: 72 (23 Aug 2024 11:41) (60 - 78)  BP: 144/82 (23 Aug 2024 11:41) (133/82 - 169/94)  BP(mean): --  RR: 18 (23 Aug 2024 11:41) (18 - 20)  SpO2: 98% (23 Aug 2024 11:41) (94% - 98%)    Parameters below as of 23 Aug 2024 11:41  Patient On (Oxygen Delivery Method): room air      CONSTITUTIONAL: Well-developed, well-groomed, lying in bed comfortably  EYES: No conjunctival or scleral injection, non-icteric; PERRLA and symmetric  ENMT: No external nasal lesions; no pharyngeal injection or exudates, oral mucosa with moist membranes  RESPIRATORY: Breathing comfortably; lungs CTA without wheeze/rhonchi/rales  CARDIOVASCULAR: +S1S2, RRR, no M/G/R; pedal pulses full and symmetric; no lower extremity edema  GASTROINTESTINAL: No palpable masses or tenderness, +BS throughout, no rebound/guarding  MUSCULOSKELETAL: No digital clubbing or cyanosis; normal strength and tone of extremities  SKIN: No rashes or ulcers noted  NEUROLOGIC: CN II-XII intact; normal reflexes in upper and lower extremities; sensation intact in LEs b/l to light touch  PSYCHIATRIC: A+O x 3; mood and affect appropriate; appropriate insight and judgment

## 2024-08-23 NOTE — DISCHARGE NOTE PROVIDER - PROVIDER TOKENS
PROVIDER:[TOKEN:[[id number]],FOLLOWUP:[2 weeks]] PROVIDER:[TOKEN:[[id number]],FOLLOWUP:[2 weeks]]

## 2024-08-23 NOTE — PROVIDER CONTACT NOTE (OTHER) - SITUATION
Blood cultures for 8/22 growth in aerobic bottle with gram positive in pair cocci and gram positive cocci and clusters

## 2024-08-23 NOTE — H&P ADULT - PROBLEM SELECTOR PLAN 1
- Follows w/Dr. Feliciano Ibrahim DO ([facility]), s/p PCI in 2007  - Per O/P notes appears hx of years of chest pain with rest, was seen in ED earlier this month as well for similar sx, and discharged at that time  - Per pt this episode more severe, radiating down LUE and hands  - Hemodynamically stable, EKG with NSR, 1st degree AVB, with T wave inversions in lateral leads unchanged from prior, HS trop 12 --> 9  - CXR with bibasilar atelectasis  - C/w home HTN/HLD medications  - amlodipine, carvedilol, valsartan, aldactone, hydralazine, isosorbide mononitrate, rosuvastatin, ezetimibe  - Cardiology consulted given cardiac hx, recurrent sx

## 2024-08-23 NOTE — ED ADULT NURSE NOTE - CHIEF COMPLAINT QUOTE
c/o chest pain and back pain xs 1 day with elevated BP readings at home pt with hx of cardiac stent.

## 2024-08-23 NOTE — DISCHARGE NOTE PROVIDER - NSDCCPTREATMENT_GEN_ALL_CORE_FT
PRINCIPAL PROCEDURE  Procedure: Left heart catheterization  Findings and Treatment: HIRAM to LAD      SECONDARY PROCEDURE  Procedure: Transthoracic echocardiography (TTE)  Findings and Treatment:

## 2024-08-23 NOTE — PATIENT PROFILE ADULT - PRO INTERPRETER NEED 2
[FreeTextEntry1] : Updated results 9/24/21:\par The hypercoagulable workup has been unremarkable. At this time she should be considered as having a provoked, first occurrence VTE / pulmonary embolism. Per established guidelines she should continue with at least 3 months of anticoagulation therapy. Once cleared by surgery in terms of bleeding risk related to the procedure itself and her healing status, we can switch to apixaban 5 mg every 12 hours.\par  English PROVIDER:[TOKEN:[[id number]],FOLLOWUP:[1-3 days]] Grenadian

## 2024-08-23 NOTE — PATIENT PROFILE ADULT - FALL HARM RISK - HARM RISK INTERVENTIONS

## 2024-08-23 NOTE — CHART NOTE - NSCHARTNOTEFT_GEN_A_CORE
s/p LHC - LAD 90% s/p HIRAM x2  Mod CX and mod RCA via RRA  site benign - no hematoma or bleeding   radial band in place - will be removed as per protocol  Post EKG with new 1mm ST depression V3 and T wave inversion  Pt is pain free and Dr Burgos notified. No intervention at this time given pt is pain free.    Plan per Dr Burgos  ASA, P2Y12 and NOAC for one week, then stop ASA and continue P2Y12 and NOAC only.  Aggressive medical management and risk factor modification.      Hypertensive since admission  On Coreg 6.25mg BID. Pt states this was recently increased to 12.5mg BID at home  Same adjusted and Norvasc increased to 5mg daily  Admit overnight. Continue close monitoring   Sign out to night ACP s/p LHC - LAD 90% s/p HIRAM x2  Mod CX and mod RCA via RRA  site benign - no hematoma or bleeding   radial band in place - will be removed as per protocol  Post EKG with new 1mm ST depression V3 and T wave inversion  Pt is pain free and Dr Burgos notified. No intervention at this time given pt is pain free.    Plan per Dr Burgos  ASA, Plavix and NOAC for one week, then stop ASA and continue Plavix and NOAC only.  Can resume Xarelto 8/23 pm   Aggressive medical management and risk factor modification.      Hypertensive since admission  On Coreg 6.25mg BID. Pt states this was recently increased to 12.5mg BID at home  Same adjusted and Norvasc increased to 5mg daily  Admit overnight. Continue close monitoring   Sign out to night ACP

## 2024-08-24 ENCOUNTER — TRANSCRIPTION ENCOUNTER (OUTPATIENT)
Age: 69
End: 2024-08-24

## 2024-08-24 ENCOUNTER — RESULT REVIEW (OUTPATIENT)
Age: 69
End: 2024-08-24

## 2024-08-24 VITALS
OXYGEN SATURATION: 93 % | RESPIRATION RATE: 16 BRPM | HEART RATE: 67 BPM | TEMPERATURE: 98 F | DIASTOLIC BLOOD PRESSURE: 81 MMHG | SYSTOLIC BLOOD PRESSURE: 153 MMHG

## 2024-08-24 LAB
A1C WITH ESTIMATED AVERAGE GLUCOSE RESULT: 5.2 % — SIGNIFICANT CHANGE UP (ref 4–5.6)
CHOLEST SERPL-MCNC: 137 MG/DL — SIGNIFICANT CHANGE UP
ESTIMATED AVERAGE GLUCOSE: 103 MG/DL — SIGNIFICANT CHANGE UP (ref 68–114)
HDLC SERPL-MCNC: 28 MG/DL — LOW
LIPID PNL WITH DIRECT LDL SERPL: 89 MG/DL — SIGNIFICANT CHANGE UP
NON HDL CHOLESTEROL: 109 MG/DL — SIGNIFICANT CHANGE UP
TRIGL SERPL-MCNC: 106 MG/DL — SIGNIFICANT CHANGE UP
TSH SERPL-MCNC: 1.15 UIU/ML — SIGNIFICANT CHANGE UP (ref 0.27–4.2)

## 2024-08-24 PROCEDURE — 36415 COLL VENOUS BLD VENIPUNCTURE: CPT

## 2024-08-24 PROCEDURE — 99239 HOSP IP/OBS DSCHRG MGMT >30: CPT

## 2024-08-24 PROCEDURE — 71046 X-RAY EXAM CHEST 2 VIEWS: CPT

## 2024-08-24 PROCEDURE — 93306 TTE W/DOPPLER COMPLETE: CPT | Mod: 26

## 2024-08-24 PROCEDURE — 99231 SBSQ HOSP IP/OBS SF/LOW 25: CPT | Mod: GC

## 2024-08-24 PROCEDURE — 83605 ASSAY OF LACTIC ACID: CPT

## 2024-08-24 PROCEDURE — C1894: CPT

## 2024-08-24 PROCEDURE — 85025 COMPLETE CBC W/AUTO DIFF WBC: CPT

## 2024-08-24 PROCEDURE — 84443 ASSAY THYROID STIM HORMONE: CPT

## 2024-08-24 PROCEDURE — 80061 LIPID PANEL: CPT

## 2024-08-24 PROCEDURE — C9600: CPT | Mod: LD

## 2024-08-24 PROCEDURE — 84132 ASSAY OF SERUM POTASSIUM: CPT

## 2024-08-24 PROCEDURE — 87637 SARSCOV2&INF A&B&RSV AMP PRB: CPT

## 2024-08-24 PROCEDURE — 84295 ASSAY OF SERUM SODIUM: CPT

## 2024-08-24 PROCEDURE — 84484 ASSAY OF TROPONIN QUANT: CPT

## 2024-08-24 PROCEDURE — C1769: CPT

## 2024-08-24 PROCEDURE — 83880 ASSAY OF NATRIURETIC PEPTIDE: CPT

## 2024-08-24 PROCEDURE — C1887: CPT

## 2024-08-24 PROCEDURE — 80053 COMPREHEN METABOLIC PANEL: CPT

## 2024-08-24 PROCEDURE — 99285 EMERGENCY DEPT VISIT HI MDM: CPT

## 2024-08-24 PROCEDURE — 85018 HEMOGLOBIN: CPT

## 2024-08-24 PROCEDURE — 83036 HEMOGLOBIN GLYCOSYLATED A1C: CPT

## 2024-08-24 PROCEDURE — 93454 CORONARY ARTERY ANGIO S&I: CPT | Mod: 59

## 2024-08-24 PROCEDURE — 96374 THER/PROPH/DIAG INJ IV PUSH: CPT

## 2024-08-24 PROCEDURE — 82947 ASSAY GLUCOSE BLOOD QUANT: CPT

## 2024-08-24 PROCEDURE — C1874: CPT

## 2024-08-24 PROCEDURE — 93306 TTE W/DOPPLER COMPLETE: CPT

## 2024-08-24 PROCEDURE — 93005 ELECTROCARDIOGRAM TRACING: CPT

## 2024-08-24 PROCEDURE — 85730 THROMBOPLASTIN TIME PARTIAL: CPT

## 2024-08-24 PROCEDURE — 82803 BLOOD GASES ANY COMBINATION: CPT

## 2024-08-24 PROCEDURE — C1725: CPT

## 2024-08-24 PROCEDURE — 85610 PROTHROMBIN TIME: CPT

## 2024-08-24 PROCEDURE — 82330 ASSAY OF CALCIUM: CPT

## 2024-08-24 PROCEDURE — 82435 ASSAY OF BLOOD CHLORIDE: CPT

## 2024-08-24 PROCEDURE — 85014 HEMATOCRIT: CPT

## 2024-08-24 RX ORDER — MEMANTINE HYDROCHLORIDE AND DONEPEZIL HYDROCHLORIDE 14; 10 MG/1; MG/1
0 CAPSULE ORAL
Qty: 0 | Refills: 0 | DISCHARGE

## 2024-08-24 RX ORDER — ROSUVASTATIN CALCIUM 10 MG/1
0 TABLET ORAL
Qty: 0 | Refills: 0 | DISCHARGE

## 2024-08-24 RX ORDER — SPIRONOLACTONE 25 MG/1
1 TABLET, FILM COATED ORAL
Qty: 0 | Refills: 0 | DISCHARGE
Start: 2024-08-24

## 2024-08-24 RX ORDER — ROPINIROLE 2 MG/1
0 TABLET, FILM COATED, EXTENDED RELEASE ORAL
Qty: 0 | Refills: 0 | DISCHARGE

## 2024-08-24 RX ORDER — PANCRELIPASE 16000; 60500; 57500 [USP'U]/1; [USP'U]/1; [USP'U]/1
0 CAPSULE, DELAYED RELEASE ORAL
Qty: 0 | Refills: 0 | DISCHARGE

## 2024-08-24 RX ORDER — CARVEDILOL 6.25 MG/1
0 TABLET ORAL
Qty: 0 | Refills: 0 | DISCHARGE

## 2024-08-24 RX ORDER — TROSPIUM CHLORIDE 60 MG/1
0 CAPSULE, EXTENDED RELEASE ORAL
Qty: 0 | Refills: 0 | DISCHARGE

## 2024-08-24 RX ORDER — ASPIRIN 81 MG
1 TABLET, DELAYED RELEASE (ENTERIC COATED) ORAL
Qty: 7 | Refills: 0
Start: 2024-08-24 | End: 2024-08-30

## 2024-08-24 RX ORDER — CARVEDILOL 6.25 MG/1
1 TABLET ORAL
Qty: 30 | Refills: 0
Start: 2024-08-24 | End: 2024-09-22

## 2024-08-24 RX ORDER — AMLODIPINE BESYLATE 10 MG/1
1 TABLET ORAL
Qty: 30 | Refills: 0
Start: 2024-08-24 | End: 2024-09-22

## 2024-08-24 RX ORDER — PANCRELIPASE 16000; 60500; 57500 [USP'U]/1; [USP'U]/1; [USP'U]/1
1 CAPSULE, DELAYED RELEASE ORAL
Qty: 0 | Refills: 0 | DISCHARGE
Start: 2024-08-24

## 2024-08-24 RX ORDER — DICLOFENAC SODIUM 20 MG/G
0 SOLUTION TOPICAL
Qty: 0 | Refills: 0 | DISCHARGE

## 2024-08-24 RX ORDER — EZETIMIBE 10 MG/1
0 TABLET ORAL
Qty: 0 | Refills: 0 | DISCHARGE

## 2024-08-24 RX ORDER — RIVAROXABAN 10 MG/1
0 TABLET, FILM COATED ORAL
Qty: 0 | Refills: 0 | DISCHARGE

## 2024-08-24 RX ORDER — VALSARTAN 40 MG/1
1 TABLET ORAL
Qty: 0 | Refills: 0 | DISCHARGE
Start: 2024-08-24

## 2024-08-24 RX ORDER — HYDRALAZINE HCL 50 MG
1 TABLET ORAL
Qty: 0 | Refills: 0 | DISCHARGE
Start: 2024-08-24

## 2024-08-24 RX ORDER — CARVEDILOL 6.25 MG/1
1 TABLET ORAL
Qty: 60 | Refills: 0
Start: 2024-08-24 | End: 2024-09-22

## 2024-08-24 RX ORDER — ISOSORBIDE MONONITRATE 30 MG/1
1 TABLET, EXTENDED RELEASE ORAL
Qty: 0 | Refills: 0 | DISCHARGE
Start: 2024-08-24

## 2024-08-24 RX ORDER — HYDROCORTISONE 1 %
0 OINTMENT (GRAM) TOPICAL
Qty: 0 | Refills: 0 | DISCHARGE

## 2024-08-24 RX ORDER — AMLODIPINE BESYLATE 10 MG/1
5 TABLET ORAL ONCE
Refills: 0 | Status: COMPLETED | OUTPATIENT
Start: 2024-08-24 | End: 2024-08-24

## 2024-08-24 RX ORDER — EZETIMIBE 10 MG/1
1 TABLET ORAL
Qty: 0 | Refills: 0 | DISCHARGE
Start: 2024-08-24

## 2024-08-24 RX ORDER — PANTOPRAZOLE SODIUM 40 MG
1 TABLET, DELAYED RELEASE (ENTERIC COATED) ORAL
Qty: 0 | Refills: 0 | DISCHARGE
Start: 2024-08-24

## 2024-08-24 RX ORDER — AZITHROMYCIN 500 MG/1
0 TABLET, FILM COATED ORAL
Qty: 0 | Refills: 0 | DISCHARGE

## 2024-08-24 RX ORDER — LEVOTHYROXINE SODIUM 100 MCG
1 TABLET ORAL
Qty: 0 | Refills: 0 | DISCHARGE
Start: 2024-08-24

## 2024-08-24 RX ORDER — SPIRONOLACTONE 25 MG/1
0 TABLET, FILM COATED ORAL
Qty: 0 | Refills: 0 | DISCHARGE

## 2024-08-24 RX ORDER — AMLODIPINE BESYLATE 10 MG/1
10 TABLET ORAL DAILY
Refills: 0 | Status: CANCELLED | OUTPATIENT
Start: 2024-08-25 | End: 2024-08-24

## 2024-08-24 RX ORDER — ROPINIROLE 2 MG/1
1 TABLET, FILM COATED, EXTENDED RELEASE ORAL
Qty: 0 | Refills: 0 | DISCHARGE
Start: 2024-08-24

## 2024-08-24 RX ORDER — UBROGEPANT 50 MG/1
0 TABLET ORAL
Qty: 0 | Refills: 0 | DISCHARGE

## 2024-08-24 RX ORDER — HYDRALAZINE HCL 50 MG
0 TABLET ORAL
Qty: 0 | Refills: 0 | DISCHARGE

## 2024-08-24 RX ORDER — ROSUVASTATIN CALCIUM 10 MG/1
1 TABLET ORAL
Qty: 0 | Refills: 0 | DISCHARGE
Start: 2024-08-24

## 2024-08-24 RX ORDER — LEVOTHYROXINE SODIUM 100 MCG
0 TABLET ORAL
Qty: 0 | Refills: 0 | DISCHARGE

## 2024-08-24 RX ORDER — PLECANATIDE 3 MG/1
0 TABLET ORAL
Qty: 0 | Refills: 0 | DISCHARGE

## 2024-08-24 RX ORDER — ISOSORBIDE MONONITRATE 30 MG/1
0 TABLET, EXTENDED RELEASE ORAL
Qty: 0 | Refills: 0 | DISCHARGE

## 2024-08-24 RX ORDER — VALSARTAN 40 MG/1
0 TABLET ORAL
Qty: 0 | Refills: 0 | DISCHARGE

## 2024-08-24 RX ORDER — TENAPANOR 31.9 MG/1
0 TABLET, FILM COATED ORAL
Qty: 0 | Refills: 0 | DISCHARGE

## 2024-08-24 RX ORDER — AMLODIPINE BESYLATE 10 MG/1
1 TABLET ORAL
Refills: 0 | DISCHARGE

## 2024-08-24 RX ORDER — DEXLANSOPRAZOLE 30 MG/1
0 CAPSULE, DELAYED RELEASE PELLETS ORAL
Qty: 0 | Refills: 0 | DISCHARGE

## 2024-08-24 RX ORDER — ATOGEPANT 60 MG/1
0 TABLET ORAL
Qty: 0 | Refills: 0 | DISCHARGE

## 2024-08-24 RX ADMIN — CARVEDILOL 12.5 MILLIGRAM(S): 6.25 TABLET ORAL at 06:14

## 2024-08-24 RX ADMIN — SPIRONOLACTONE 25 MILLIGRAM(S): 25 TABLET, FILM COATED ORAL at 06:25

## 2024-08-24 RX ADMIN — Medication 81 MILLIGRAM(S): at 06:15

## 2024-08-24 RX ADMIN — Medication 40 MILLIGRAM(S): at 06:14

## 2024-08-24 RX ADMIN — Medication 75 MILLIGRAM(S): at 06:14

## 2024-08-24 RX ADMIN — OXYBUTYNIN CHLORIDE 5 MILLIGRAM(S): 5 TABLET ORAL at 06:14

## 2024-08-24 RX ADMIN — ACETAMINOPHEN 650 MILLIGRAM(S): 325 TABLET ORAL at 00:05

## 2024-08-24 RX ADMIN — Medication 50 MICROGRAM(S): at 04:18

## 2024-08-24 RX ADMIN — Medication 25 MILLIGRAM(S): at 06:16

## 2024-08-24 RX ADMIN — VALSARTAN 320 MILLIGRAM(S): 40 TABLET ORAL at 06:14

## 2024-08-24 RX ADMIN — AMLODIPINE BESYLATE 5 MILLIGRAM(S): 10 TABLET ORAL at 06:14

## 2024-08-24 NOTE — PROGRESS NOTE ADULT - PROBLEM SELECTOR PLAN 2
Known s/p ablation 3/2021  - Subsequent monitoring (preventice, holter) with no further A-fib, some PACs, few beats of SVT  - C/w Xarelto 20 mg daily

## 2024-08-24 NOTE — DISCHARGE NOTE NURSING/CASE MANAGEMENT/SOCIAL WORK - NSDCPEPT PROEDMA_GEN_ALL_CORE
Yes

## 2024-08-24 NOTE — PROGRESS NOTE ADULT - ASSESSMENT
75M PMH BPH, asthma, HTN, HLD, COPD, GERD, hypothyroid, CAD with prior stents, AF (s/p ablation 2022, on AC, off antiplatelets) p/w anginal chest pressure.    Trop negative.  EKG sinus without acute ischemic changes.  [facility] 8/23, s/p PCI to 90% pLAD, no other significant disease.    Recommendations:  - C/w [id number] daily, Plavix 75mg daily - STOP ASA after 1 week  - Resume home DOAC (triple therapy 1 week)  - High intensity statin, Zetia, goal LDL < 55  - TTE done, read pending   - C/w Coreg 12.5mg BID and other home anti-HTN meds  - Pending TTE read can be discharge today    Rodolfo Maxwell, PGY-5  Cardiology Fellow    For all new consults  [email or web address]  Login: [email or web address]   75M PMH BPH, asthma, HTN, HLD, COPD, GERD, hypothyroid, CAD with prior stents, AF (s/p ablation 2022, on AC, off antiplatelets) p/w anginal chest pressure.    Trop negative.  EKG sinus without acute ischemic changes.  [facility] 8/23, s/p PCI to 90% pLAD, no other significant disease.    Recommendations:  - C/w [id number] daily, Plavix 75mg daily - STOP ASA after 1 week  - Resume home DOAC (triple therapy 1 week)  - High intensity statin, Zetia, goal LDL < 55  - TTE pending  - C/w Coreg 12.5mg BID and other home anti-HTN meds  - Pending TTE results, can be discharge today    Rodolfo Maxwell, PGY-5  Cardiology Fellow    For all new consults  [email or web address]  Login: [email or web address]

## 2024-08-24 NOTE — PROGRESS NOTE ADULT - PROBLEM SELECTOR PLAN 1
No chest pain, SOB, afebrile, VSS  - on admission EKG with NSR, 1st degree AVB, with T wave inversions in lateral leads unchanged from prior, HS trop 12 --> 9  - s/p LHC, s/p PCI w HIRAM to LAD ( 90% occlusion) via RRA yesterday, no complications  - C/w DAPT, statin ( Crestor and Zetia), d/c ASA after a week and c/w Plavix and DOAC  - home medications - increase amlodipine 10mg, carvedilol 12.5mg bid, hydralazine 25mg tid, valsartan 320mg isosorbide mononitrate 30mg  - Echo pending  - If cardiology clear, will d/c home   - Outpatient f/u with cardiology in 10-14 days

## 2024-08-24 NOTE — PROGRESS NOTE ADULT - ASSESSMENT
69y.o. Vietnamese-speaking male w/PMHx of CAD s/p PCI, AFib s/p ablation on Xarelto, HLD, asthma/COPD, prostatectomy p/w chest pain. s/p LHC and PCI to LAD

## 2024-08-24 NOTE — DISCHARGE NOTE NURSING/CASE MANAGEMENT/SOCIAL WORK - PATIENT PORTAL LINK FT
You can access the FollowMyHealth Patient Portal offered by [facility] by registering at the following website: [email or web address]. By joining [email or web address] FollowMyHealth portal, you will also be able to view your health information using other applications (apps) compatible with our system.

## 2024-08-24 NOTE — PROGRESS NOTE ADULT - ATTENDING COMMENTS
Patient seen and examined. Agree with assessment and plan as outlined above. Patient gets edema from Norvasc, will leave at 2.5. Coreg increased to 12.5 BID. Triple therapy for one week then d/c ASA as outlined above. Patient largely intolerant of statin. Continue Crestor 2 times a week. He is on Zetia and Praluent. LDL was not at goal. Further titration with outpatient cardiologist.

## 2024-08-24 NOTE — PROGRESS NOTE ADULT - SUBJECTIVE AND OBJECTIVE BOX
Cardiology Fellow Consult Progress Note    Subjective: no CP, SOB, palpitations, dizziness. Overall feeling well. R radial site mildly painful, no hematoma.     No acute events overnight. No significant tele events. NSR 60s.    REVIEW OF SYSTEMS:  CONSTITUTIONAL: No weakness, fevers or chills  EYES/ENT: No visual changes;  No dysphagia  NECK: No pain or stiffness  RESPIRATORY: No cough, wheezing, hemoptysis; No shortness of breath  CARDIOVASCULAR: No chest pain or palpitations; No lower extremity edema  GASTROINTESTINAL: No abdominal or epigastric pain. No nausea, vomiting, or hematemesis; No diarrhea or constipation. No melena or hematochezia.  BACK: No back pain  GENITOURINARY: No dysuria, frequency or hematuria  NEUROLOGICAL: No numbness or weakness (+headache)  SKIN: No itching, burning, rashes, or lesions   All other review of systems is negative unless indicated above.    Physical Exam:  T(F): 98.6 (-), Max: 98.6 ()  HR: 64 () (61 - 78)  BP: 152/82 () (127/75 - 189/95)  RR: 17 (-)  SpO2: 94% ()  GEN: comfortable appearing, lying in bed comfortably   HEENT: NCAT, MMM, thick neck   CV: Regular S1, S2, no m/r/g  RESP: Diminished breath sounds BL worse at bases, on RA laying flat   ABD: Soft, NTND, +BS  EXT: No LE edema, WWP, pulses palpable throughout  NEURO: No focal deficits, AOx3  SKIN:  No rashes    Carioovascular diagnostic testings: personally reviewed    Imaging diagnostic testings: personally reviewed    Labs: Personally reviewed                        14.5   9.52  )-----------( 182      ( 22 Aug 2024 23:46 )             44.0         142  |  107  |  18  ----------------------------<  96  3.7   |  26  |  1.09    Ca    9.9      22 Aug 2024 23:46    TPro  6.5  /  Alb  3.8  /  TBili  0.3  /  DBili  x   /  AST  15  /  ALT  15  /  AlkPhos  76      PT/INR - ( 22 Aug 2024 23:46 )   PT: 15.3 sec;   INR: 1.47 ratio         PTT - ( 22 Aug 2024 23:46 )  PTT:37.9 sec    CARDIAC MARKERS ( 23 Aug 2024 02:43 )  9 ng/L / x     / x     / x     / x     / x      CARDIAC MARKERS ( 22 Aug 2024 23:46 )  12 ng/L / x     / x     / x     / x     / x          Total Cholesterol: 137  LDL: --  HDL: 28  T        
Mohsin Khan, MD  Attending Physician, Division Of Hospital Medicine  Office: [phone]      Patient is a 69y old  Male who presents with a chief complaint of chest pain    SUBJECTIVE / OVERNIGHT EVENTS:  Seen, examined the patient this am  Resting in bed, no c/o chest pain, SOB  s/p LHC and PCI yesterday via RRA, no complication    MEDICATIONS  (STANDING):  aspirin enteric coated 81 milliGRAM(s) Oral daily  carvedilol 12.5 milliGRAM(s) Oral every 12 hours  clopidogrel Tablet 75 milliGRAM(s) Oral daily  ezetimibe 10 milliGRAM(s) Oral daily  hydrALAZINE 25 milliGRAM(s) Oral three times a day  isosorbide   mononitrate ER Tablet (IMDUR) 30 milliGRAM(s) Oral daily  levothyroxine 50 MICROGram(s) Oral daily  oxybutynin 5 milliGRAM(s) Oral two times a day  pancrelipase  (CREON 36,000 Lipase Units) 1 Capsule(s) Oral three times a day with meals  pantoprazole    Tablet 40 milliGRAM(s) Oral before breakfast  rOPINIRole 1 milliGRAM(s) Oral at bedtime  rosuvastatin 10 milliGRAM(s) Oral at bedtime  sodium chloride 0.9%. 1000 milliLiter(s) (75 mL/Hr) IV Continuous <Continuous>  sodium chloride 0.9%. 1000 milliLiter(s) (75 mL/Hr) IV Continuous <Continuous>  spironolactone 25 milliGRAM(s) Oral daily  valsartan 320 milliGRAM(s) Oral daily    MEDICATIONS  (PRN):  acetaminophen     Tablet .. 650 milliGRAM(s) Oral every 6 hours PRN Temp greater or equal to 38C (100.4F), Mild Pain (1 - 3)      Vital Signs Last 24 Hrs  T(C): 36.9 (24 Aug 2024 09:07), Max: 37 (24 Aug 2024 00:45)  T(F): 98.5 (24 Aug 2024 09:07), Max: 98.6 (24 Aug 2024 00:45)  HR: 67 (24 Aug 2024 09:07) (61 - 78)  BP: 153/81 (24 Aug 2024 09:07) (127/75 - 189/95)  BP(mean): 109 (24 Aug 2024 09:07) (94 - 119)  RR: 16 (24 Aug 2024 09:07) (15 - 19)  SpO2: 93% (24 Aug 2024 09:07) (92% - 98%)    Parameters below as of 24 Aug 2024 09:07  Patient On (Oxygen Delivery Method): room air      CAPILLARY BLOOD GLUCOSE        I&O's Summary    24 Aug 2024 07:01  -  24 Aug 2024 09:58  --------------------------------------------------------  IN: 240 mL / OUT: 0 mL / NET: 240 mL        PHYSICAL EXAM:-  GENERAL: NAD, well-developed  EYES: EOMI, PERRLA, conjunctiva and sclera clear  NECK: Supple, No JVD, no thyromegaly  CHEST/LUNG: Clear to auscultation bilaterally; No wheeze  HEART: Regular rate and rhythm; S1, S2 audible, No murmurs, rubs, or gallops  ABDOMEN: Soft, Nontender, Nondistended; Bowel sounds present  EXTREMITIES:  2+ Peripheral Pulses, RRA- no swelling, hematoma  NEURO: AAOx3, no focal deficit      LABS:                        14.5   9.52  )-----------( 182      ( 22 Aug 2024 23:46 )             44.0     08-22    142  |  107  |  18  ----------------------------<  96  3.7   |  26  |  1.09    Ca    9.9      22 Aug 2024 23:46    TPro  6.5  /  Alb  3.8  /  TBili  0.3  /  DBili  x   /  AST  15  /  ALT  15  /  AlkPhos  76  08-22    PT/INR - ( 22 Aug 2024 23:46 )   PT: 15.3 sec;   INR: 1.47 ratio         PTT - ( 22 Aug 2024 23:46 )  PTT:37.9 sec      Urinalysis Basic - ( 22 Aug 2024 23:46 )    Color: x / Appearance: x / SG: x / pH: x  Gluc: 96 mg/dL / Ketone: x  / Bili: x / Urobili: x   Blood: x / Protein: x / Nitrite: x   Leuk Esterase: x / RBC: x / WBC x   Sq Epi: x / Non Sq Epi: x / Bacteria: x        RADIOLOGY & ADDITIONAL TESTS:    Imaging Personally Reviewed: [facility]  Consultant(s) Notes Reviewed:  Card

## 2024-09-04 ENCOUNTER — INPATIENT (INPATIENT)
Facility: HOSPITAL | Age: 69
LOS: 1 days | Discharge: HOME | DRG: 948 | End: 2024-09-06
Attending: HOSPITALIST | Admitting: HOSPITALIST
Payer: MEDICARE

## 2024-09-04 VITALS
TEMPERATURE: 98 F | HEART RATE: 72 BPM | SYSTOLIC BLOOD PRESSURE: 134 MMHG | HEIGHT: 65 IN | WEIGHT: 218.48 LBS | DIASTOLIC BLOOD PRESSURE: 75 MMHG | OXYGEN SATURATION: 97 % | RESPIRATION RATE: 20 BRPM

## 2024-09-04 DIAGNOSIS — Z95.5 PRESENCE OF CORONARY ANGIOPLASTY IMPLANT AND GRAFT: Chronic | ICD-10-CM

## 2024-09-04 LAB
ALBUMIN SERPL ELPH-MCNC: 3.8 G/DL — SIGNIFICANT CHANGE UP (ref 3.3–5)
ALP SERPL-CCNC: 83 U/L — SIGNIFICANT CHANGE UP (ref 40–120)
ALT FLD-CCNC: 20 U/L — SIGNIFICANT CHANGE UP (ref 10–45)
ANION GAP SERPL CALC-SCNC: 13 MMOL/L — SIGNIFICANT CHANGE UP (ref 5–17)
APTT BLD: 27.6 SEC — SIGNIFICANT CHANGE UP (ref 24.5–35.6)
AST SERPL-CCNC: 29 U/L — SIGNIFICANT CHANGE UP (ref 10–40)
BASOPHILS # BLD AUTO: 0.07 K/UL — SIGNIFICANT CHANGE UP (ref 0–0.2)
BASOPHILS NFR BLD AUTO: 0.8 % — SIGNIFICANT CHANGE UP (ref 0–2)
BILIRUB SERPL-MCNC: 0.3 MG/DL — SIGNIFICANT CHANGE UP (ref 0.2–1.2)
BUN SERPL-MCNC: 17 MG/DL — SIGNIFICANT CHANGE UP (ref 7–23)
CALCIUM SERPL-MCNC: 10.2 MG/DL — SIGNIFICANT CHANGE UP (ref 8.4–10.5)
CHLORIDE SERPL-SCNC: 107 MMOL/L — SIGNIFICANT CHANGE UP (ref 96–108)
CK SERPL-CCNC: 78 U/L — SIGNIFICANT CHANGE UP (ref 30–200)
CO2 SERPL-SCNC: 21 MMOL/L — LOW (ref 22–31)
CREAT SERPL-MCNC: 1.03 MG/DL — SIGNIFICANT CHANGE UP (ref 0.5–1.3)
EGFR: 78 ML/MIN/1.73M2 — SIGNIFICANT CHANGE UP
EOSINOPHIL # BLD AUTO: 0.37 K/UL — SIGNIFICANT CHANGE UP (ref 0–0.5)
EOSINOPHIL NFR BLD AUTO: 4.4 % — SIGNIFICANT CHANGE UP (ref 0–6)
GLUCOSE SERPL-MCNC: 84 MG/DL — SIGNIFICANT CHANGE UP (ref 70–99)
HCT VFR BLD CALC: 47.7 % — SIGNIFICANT CHANGE UP (ref 39–50)
HGB BLD-MCNC: 16.2 G/DL — SIGNIFICANT CHANGE UP (ref 13–17)
IMM GRANULOCYTES NFR BLD AUTO: 0.4 % — SIGNIFICANT CHANGE UP (ref 0–0.9)
INR BLD: 1.98 RATIO — HIGH (ref 0.85–1.18)
LYMPHOCYTES # BLD AUTO: 1.9 K/UL — SIGNIFICANT CHANGE UP (ref 1–3.3)
LYMPHOCYTES # BLD AUTO: 22.8 % — SIGNIFICANT CHANGE UP (ref 13–44)
MCHC RBC-ENTMCNC: 30.5 PG — SIGNIFICANT CHANGE UP (ref 27–34)
MCHC RBC-ENTMCNC: 34 GM/DL — SIGNIFICANT CHANGE UP (ref 32–36)
MCV RBC AUTO: 89.8 FL — SIGNIFICANT CHANGE UP (ref 80–100)
MONOCYTES # BLD AUTO: 0.71 K/UL — SIGNIFICANT CHANGE UP (ref 0–0.9)
MONOCYTES NFR BLD AUTO: 8.5 % — SIGNIFICANT CHANGE UP (ref 2–14)
NEUTROPHILS # BLD AUTO: 5.26 K/UL — SIGNIFICANT CHANGE UP (ref 1.8–7.4)
NEUTROPHILS NFR BLD AUTO: 63.1 % — SIGNIFICANT CHANGE UP (ref 43–77)
NRBC # BLD: 0 /100 WBCS — SIGNIFICANT CHANGE UP (ref 0–0)
NT-PROBNP SERPL-SCNC: 247 PG/ML — SIGNIFICANT CHANGE UP (ref 0–300)
PLATELET # BLD AUTO: 210 K/UL — SIGNIFICANT CHANGE UP (ref 150–400)
POTASSIUM SERPL-MCNC: 4.4 MMOL/L — SIGNIFICANT CHANGE UP (ref 3.5–5.3)
POTASSIUM SERPL-SCNC: 4.4 MMOL/L — SIGNIFICANT CHANGE UP (ref 3.5–5.3)
PROT SERPL-MCNC: 6.8 G/DL — SIGNIFICANT CHANGE UP (ref 6–8.3)
PROTHROM AB SERPL-ACNC: 20.4 SEC — HIGH (ref 9.5–13)
RBC # BLD: 5.31 M/UL — SIGNIFICANT CHANGE UP (ref 4.2–5.8)
RBC # FLD: 13.2 % — SIGNIFICANT CHANGE UP (ref 10.3–14.5)
SODIUM SERPL-SCNC: 141 MMOL/L — SIGNIFICANT CHANGE UP (ref 135–145)
TROPONIN T, HIGH SENSITIVITY RESULT: 11 NG/L — SIGNIFICANT CHANGE UP (ref 0–51)
TROPONIN T, HIGH SENSITIVITY RESULT: 9 NG/L — SIGNIFICANT CHANGE UP (ref 0–51)
WBC # BLD: 8.34 K/UL — SIGNIFICANT CHANGE UP (ref 3.8–10.5)
WBC # FLD AUTO: 8.34 K/UL — SIGNIFICANT CHANGE UP (ref 3.8–10.5)

## 2024-09-04 PROCEDURE — 71045 X-RAY EXAM CHEST 1 VIEW: CPT | Mod: 26

## 2024-09-04 PROCEDURE — 76937 US GUIDE VASCULAR ACCESS: CPT | Mod: 26

## 2024-09-04 PROCEDURE — 93308 TTE F-UP OR LMTD: CPT | Mod: 26

## 2024-09-04 PROCEDURE — 71275 CT ANGIOGRAPHY CHEST: CPT | Mod: 26,MC

## 2024-09-04 PROCEDURE — 99285 EMERGENCY DEPT VISIT HI MDM: CPT

## 2024-09-04 RX ORDER — IPRATROPIUM BROMIDE AND ALBUTEROL SULFATE .5; 3 MG/3ML; MG/3ML
3 SOLUTION RESPIRATORY (INHALATION) ONCE
Refills: 0 | Status: COMPLETED | OUTPATIENT
Start: 2024-09-04 | End: 2024-09-04

## 2024-09-04 RX ORDER — ASPIRIN 81 MG
325 TABLET, DELAYED RELEASE (ENTERIC COATED) ORAL ONCE
Refills: 0 | Status: COMPLETED | OUTPATIENT
Start: 2024-09-04 | End: 2024-09-04

## 2024-09-04 RX ORDER — DEXAMETHASONE 0.75 MG
10 TABLET ORAL ONCE
Refills: 0 | Status: COMPLETED | OUTPATIENT
Start: 2024-09-04 | End: 2024-09-04

## 2024-09-04 RX ORDER — ACETAMINOPHEN 325 MG/1
650 TABLET ORAL ONCE
Refills: 0 | Status: COMPLETED | OUTPATIENT
Start: 2024-09-04 | End: 2024-09-04

## 2024-09-04 RX ADMIN — IPRATROPIUM BROMIDE AND ALBUTEROL SULFATE 3 MILLILITER(S): .5; 3 SOLUTION RESPIRATORY (INHALATION) at 14:44

## 2024-09-04 RX ADMIN — IPRATROPIUM BROMIDE AND ALBUTEROL SULFATE 3 MILLILITER(S): .5; 3 SOLUTION RESPIRATORY (INHALATION) at 14:41

## 2024-09-04 RX ADMIN — Medication 102 MILLIGRAM(S): at 15:04

## 2024-09-04 RX ADMIN — IPRATROPIUM BROMIDE AND ALBUTEROL SULFATE 3 MILLILITER(S): .5; 3 SOLUTION RESPIRATORY (INHALATION) at 14:40

## 2024-09-04 RX ADMIN — ACETAMINOPHEN 650 MILLIGRAM(S): 325 TABLET ORAL at 17:55

## 2024-09-04 NOTE — CONSULT NOTE ADULT - SUBJECTIVE AND OBJECTIVE BOX
Jacoby Oconnor MD  Cardiology Fellow  [phone]  All Cardiology service information can be found  on [email or web address], password: [email or web address]    Patient seen and evaluated at bedside    HPI: 69M w/ PMH of CAD (most recently s/p PCI to LAD 2 weeks ago), HTN, HLD, GERD, AF (s/p ablation ) presenting with dizziness and chest pain. Pain is sharp, reproducible, not like pain from prior to the stents. Also has been checking BPs at home, wild swings from 180s to 90s, feels dizzy and fatigued when his blood pressure is low. EKG here w/ nonspecific STD in anterolateral leads, improved from prior EKG. Labs w/ negative trops. Compliant with meds.      Cardiac Meds: plavix, amlo 2.5, coreg 12.5, imdur 30, rosuva 10, xarelto 20, hctz 25, fernando 25, valsartan 320, ezetimibe 10, hydral 50 TID    PMHx:   CAD (Coronary Atherosclerotic Disease)    HTN (Hypertension)    Asthma    GERD (Gastroesophageal Reflux Disease)    Dyslipidemia    Obstructive Sleep Apnea    History of prostate cancer    GI bleed    Insomnia    Prostate cancer    Impotence sexual    Stress incontinence, male    MI (myocardial infarction)    BPH (Benign Prostatic Hyperplasia)    Male urinary stress incontinence    Sleep apnea    Cardiac abnormality    Prostate cancer    Hyperlipidemia    Hypertension    Gastroesophageal reflux disease    COPD (chronic obstructive pulmonary disease)    IBS (irritable bowel syndrome)    Urinary incontinence        PSHx:   S/P UPPP (Uvulopalatopharyngoplasty)    s/p greenlight prostate  surgery 2010    S/P colonoscopy    S/P prostatectomy    H/O prostatectomy    S/P uvulopalatopharyngoplasty    Penile abnormality    Stented coronary artery        Allergies:  No Known Allergies      Current Medications:       FAMILY HISTORY:  Family history of hypertension (Sibling)  father  60y/o of htn, MI, mother hx htn, 1 brother htn    History of hypertension (Sibling)          REVIEW OF SYSTEMS:  CONSTITUTIONAL: No weakness, fevers or chills  EYES/ENT: No visual changes;  No dysphagia  NECK: No pain or stiffness  RESPIRATORY: No cough, wheezing, hemoptysis; No shortness of breath  CARDIOVASCULAR: No chest pain or palpitations; No lower extremity edema  GASTROINTESTINAL: No abdominal or epigastric pain. No nausea, vomiting, or hematemesis; No diarrhea or constipation. No melena or hematochezia.  BACK: No back pain  GENITOURINARY: No dysuria, frequency or hematuria  NEUROLOGICAL: No numbness or weakness  SKIN: No itching, burning, rashes, or lesions   All other review of systems is negative unless indicated above.    Physical Exam:  T(F): 97.5 (), Max: 98.3 ()  HR: 68 () (68 - 72)  BP: 124/76 () (120/80 - 134/75)  RR: 18 ()  SpO2: 97% ()  GEN: NAD  HEENT: EOMI, clear sclera  PULM: CTA b/l, no wheeze  CV: RRR S1 S2, no murmur, no JVD, reproducible pain on exam  ABD: S, NT, ND  EXT: WWP, no edema  PSYCH: normal affect  SKIN: No rash    CXR: Personally reviewed    Labs: Personally reviewed                        16.2   8.34  )-----------( 210      ( 04 Sep 2024 15:05 )             47.7         141  |  107  |  17  ----------------------------<  84  4.4   |  21<L>  |  1.03    Ca    10.2      04 Sep 2024 15:05    TPro  6.8  /  Alb  3.8  /  TBili  0.3  /  DBili  x   /  AST  29  /  ALT  20  /  AlkPhos  83  -04    PT/INR - ( 04 Sep 2024 15:05 )   PT: 20.4 sec;   INR: 1.98 ratio         PTT - ( 04 Sep 2024 15:05 )  PTT:27.6 sec    CARDIAC MARKERS ( 04 Sep 2024 15:05 )  11 ng/L / x     / x     / x     / x     / x

## 2024-09-04 NOTE — ED PROVIDER NOTE - PROGRESS NOTE DETAILS
Received signout Adult male pmh coronary artery disease sp recent pci, afib, asthma/copd, Now pw c/o dizzy, faigue shortness of breath and cp on exertion. Seen by cards and felf symptoms 2/2 meds.Dimer elevated plan cta  and possible cdu for obs. Cards final recs'  PE WDWN male awake alert normocephalic atraumatic neck supple chest clear anterior & posterior cv no rmg, Neuro non focal  Endorsed to MARISA Matta MD, Facep       . Mosotho-speaking male w/PMHx of CAD s/p PCI, AFib s/p ablation on Xarelto, HLD, asthma/COPD, prostatectomy, s/p LHC and PCI to LAD on 8/23 presenting with worsening chest pain and sobx 2-3 days. patient states he was feeling weak for the past two weeks since being home and now is having sob and chest pain. Patient states pain worse on exertion but happen as rest and while lying flat. Patient denies fever, chills, worsening leg edema and cough. Fredi Crowe DO (PGY-2) Received signout on patient is Ukrainian-speaking male history of A-fib status post ablation on Xarelto, hyperlipidemia, recent LHC and PCI to LAD on 8/23 with [place] presents c/o SOB and CP. Trops flat, chxr non actionable. PE study negative. Tba medicine for CP.

## 2024-09-04 NOTE — ED PROCEDURE NOTE - PROCEDURE ADDITIONAL DETAILS
Emergency Department Focused Ultrasound performed at patient's bedside for placement of ultrasound guided IV. The study was confirmed with blood return and ease of flushing saline.    Upper extremity laterality: LUE  IV Gauge: 18g

## 2024-09-04 NOTE — CONSULT NOTE ADULT - ASSESSMENT
69M w/ PMH of CAD (most recently s/p PCI to LAD 2 weeks ago), HTN, HLD, GERD, AF (s/p ablation 2022) presenting with dizziness and chest pain. In stent thrombosis unlikely given clinical stability and lack of ST elevations, pain is also reproducible and of different quality than patient's angina. Suspect patient is symptomatic from iatrogenic hypotension at home due to overmedication. Will pull back some antihypertensives. TTE w/ preserved EF.    Recs:  -c/w plavix, xarelto 20mg, rosuva 10, ezetimibe 10  -c/w coreg 12.5, HCTZ 25, valsartan 320, amlo 2.5  -continue fernando 25, imdur 30  -stop hydralazine  -monitor BPs overnight  -serial EKGs and troponins, if chest pain syndrome changes or worsens, please reach out

## 2024-09-04 NOTE — ED ADULT NURSE NOTE - OBJECTIVE STATEMENT
69 year old male pt presented to the ED co chest pain and shortness of breath, pt states recent stent approx 2 weeks ago, with recent chest pain and shortness of breath a few days, pt is ambulatory A&Ox3 states he saw is cardiologist and advised to come in for abnormal EKG, b/l lower extremity edema noted abd soft nontender non distended

## 2024-09-04 NOTE — ED PROVIDER NOTE - CLINICAL SUMMARY MEDICAL DECISION MAKING FREE TEXT BOX
attending sean - 69m hx cad, copd, chf pw sob and cp. Concern for acs vs pe vs copd. pt is on xarelto so pe less likely. plan for trops, nebs, xray. I read ekg as sinus rhythm with sinus arrythmia, with first degree av block, lad, st depression anterior laterally, qtc 420.

## 2024-09-04 NOTE — ED CLERICAL - NS ED CLERK NOTE PRE-ARRIVAL INFORMATION; ADDITIONAL PRE-ARRIVAL INFORMATION
CC/Reason For referral: re urrent chest pain elevated trops stent 2weeks ago  Preferred Consultant(if applicable):any  Who admits for you (if needed):any  Do you have documents you would like to fax over?no  Would you still like to speak to an ED attending?  no

## 2024-09-04 NOTE — ED PROVIDER NOTE - OBJECTIVE STATEMENT
[id number]  [id number]    69y.o. Indian-speaking male w/PMHx of CAD s/p PCI, AFib s/p ablation on Xarelto, HLD, asthma/COPD, prostatectomy, s/p LHC and PCI to LAD on 8/23 presenting with worsening chest pain and sobx 2-3 days. patient states he was feeling weak for the past two weeks since being home and now is having sob and chest pain. Patient states pain worse on exertion but happen as rest and while lying flat. Patient denies fever, chills, worsening leg edema and cough.

## 2024-09-04 NOTE — CONSULT NOTE ADULT - ATTENDING COMMENTS
Recent PCI LAD with marked variation in BP and some dizziness. For now, monitor and adjust BP meds. No indication for cardiac cath at this time.

## 2024-09-05 DIAGNOSIS — G31.84 MILD COGNITIVE IMPAIRMENT OF UNCERTAIN OR UNKNOWN ETIOLOGY: ICD-10-CM

## 2024-09-05 DIAGNOSIS — E78.5 HYPERLIPIDEMIA, UNSPECIFIED: ICD-10-CM

## 2024-09-05 DIAGNOSIS — E03.9 HYPOTHYROIDISM, UNSPECIFIED: ICD-10-CM

## 2024-09-05 DIAGNOSIS — R07.9 CHEST PAIN, UNSPECIFIED: ICD-10-CM

## 2024-09-05 DIAGNOSIS — E66.9 OBESITY, UNSPECIFIED: ICD-10-CM

## 2024-09-05 DIAGNOSIS — I10 ESSENTIAL (PRIMARY) HYPERTENSION: ICD-10-CM

## 2024-09-05 DIAGNOSIS — K21.9 GASTRO-ESOPHAGEAL REFLUX DISEASE WITHOUT ESOPHAGITIS: ICD-10-CM

## 2024-09-05 DIAGNOSIS — Z79.899 OTHER LONG TERM (CURRENT) DRUG THERAPY: ICD-10-CM

## 2024-09-05 DIAGNOSIS — R32 UNSPECIFIED URINARY INCONTINENCE: ICD-10-CM

## 2024-09-05 DIAGNOSIS — I48.91 UNSPECIFIED ATRIAL FIBRILLATION: ICD-10-CM

## 2024-09-05 DIAGNOSIS — R53.1 WEAKNESS: ICD-10-CM

## 2024-09-05 LAB
ALBUMIN SERPL ELPH-MCNC: 4.1 G/DL — SIGNIFICANT CHANGE UP (ref 3.3–5)
ALP SERPL-CCNC: 78 U/L — SIGNIFICANT CHANGE UP (ref 40–120)
ALT FLD-CCNC: 22 U/L — SIGNIFICANT CHANGE UP (ref 10–45)
ANION GAP SERPL CALC-SCNC: 14 MMOL/L — SIGNIFICANT CHANGE UP (ref 5–17)
AST SERPL-CCNC: 17 U/L — SIGNIFICANT CHANGE UP (ref 10–40)
BILIRUB SERPL-MCNC: 0.3 MG/DL — SIGNIFICANT CHANGE UP (ref 0.2–1.2)
BUN SERPL-MCNC: 20 MG/DL — SIGNIFICANT CHANGE UP (ref 7–23)
CALCIUM SERPL-MCNC: 11.1 MG/DL — HIGH (ref 8.4–10.5)
CHLORIDE SERPL-SCNC: 106 MMOL/L — SIGNIFICANT CHANGE UP (ref 96–108)
CO2 SERPL-SCNC: 19 MMOL/L — LOW (ref 22–31)
CREAT SERPL-MCNC: 0.95 MG/DL — SIGNIFICANT CHANGE UP (ref 0.5–1.3)
EGFR: 86 ML/MIN/1.73M2 — SIGNIFICANT CHANGE UP
GLUCOSE SERPL-MCNC: 122 MG/DL — HIGH (ref 70–99)
POTASSIUM SERPL-MCNC: 4.1 MMOL/L — SIGNIFICANT CHANGE UP (ref 3.5–5.3)
POTASSIUM SERPL-SCNC: 4.1 MMOL/L — SIGNIFICANT CHANGE UP (ref 3.5–5.3)
PROT SERPL-MCNC: 7.3 G/DL — SIGNIFICANT CHANGE UP (ref 6–8.3)
SODIUM SERPL-SCNC: 139 MMOL/L — SIGNIFICANT CHANGE UP (ref 135–145)

## 2024-09-05 PROCEDURE — 99221 1ST HOSP IP/OBS SF/LOW 40: CPT

## 2024-09-05 PROCEDURE — 99223 1ST HOSP IP/OBS HIGH 75: CPT

## 2024-09-05 RX ORDER — VALSARTAN 40 MG/1
320 TABLET ORAL DAILY
Refills: 0 | Status: DISCONTINUED | OUTPATIENT
Start: 2024-09-05 | End: 2024-09-06

## 2024-09-05 RX ORDER — TIOTROPIUM BROMIDE INHALATION SPRAY 3.12 UG/1
2 SPRAY, METERED RESPIRATORY (INHALATION) DAILY
Refills: 0 | Status: DISCONTINUED | OUTPATIENT
Start: 2024-09-05 | End: 2024-09-06

## 2024-09-05 RX ORDER — MAGNESIUM, ALUMINUM HYDROXIDE 200-225/5
30 SUSPENSION, ORAL (FINAL DOSE FORM) ORAL EVERY 4 HOURS
Refills: 0 | Status: DISCONTINUED | OUTPATIENT
Start: 2024-09-05 | End: 2024-09-06

## 2024-09-05 RX ORDER — CARVEDILOL 6.25 MG/1
12.5 TABLET ORAL EVERY 12 HOURS
Refills: 0 | Status: DISCONTINUED | OUTPATIENT
Start: 2024-09-05 | End: 2024-09-06

## 2024-09-05 RX ORDER — ROSUVASTATIN CALCIUM 10 MG/1
10 TABLET ORAL AT BEDTIME
Refills: 0 | Status: DISCONTINUED | OUTPATIENT
Start: 2024-09-05 | End: 2024-09-06

## 2024-09-05 RX ORDER — ACETAMINOPHEN 325 MG/1
650 TABLET ORAL EVERY 6 HOURS
Refills: 0 | Status: DISCONTINUED | OUTPATIENT
Start: 2024-09-05 | End: 2024-09-06

## 2024-09-05 RX ORDER — DULOXETINE HCL 30 MG
1 CAPSULE,DELAYED RELEASE (ENTERIC COATED) ORAL
Refills: 0 | DISCHARGE

## 2024-09-05 RX ORDER — PLECANATIDE 3 MG/1
1 TABLET ORAL
Refills: 0 | DISCHARGE

## 2024-09-05 RX ORDER — LEVOTHYROXINE SODIUM 100 MCG
50 TABLET ORAL DAILY
Refills: 0 | Status: DISCONTINUED | OUTPATIENT
Start: 2024-09-05 | End: 2024-09-06

## 2024-09-05 RX ORDER — OXYBUTYNIN CHLORIDE 5 MG/1
5 TABLET ORAL
Refills: 0 | Status: DISCONTINUED | OUTPATIENT
Start: 2024-09-05 | End: 2024-09-06

## 2024-09-05 RX ORDER — PANTOPRAZOLE SODIUM 40 MG
40 TABLET, DELAYED RELEASE (ENTERIC COATED) ORAL
Refills: 0 | Status: DISCONTINUED | OUTPATIENT
Start: 2024-09-05 | End: 2024-09-06

## 2024-09-05 RX ORDER — RIVAROXABAN 10 MG/1
20 TABLET, FILM COATED ORAL
Refills: 0 | Status: DISCONTINUED | OUTPATIENT
Start: 2024-09-05 | End: 2024-09-06

## 2024-09-05 RX ORDER — BUDESONIDE AND FORMOTEROL FUMARATE 80; 4.5 UG/1; UG/1
2 AEROSOL, METERED RESPIRATORY (INHALATION)
Refills: 0 | Status: DISCONTINUED | OUTPATIENT
Start: 2024-09-05 | End: 2024-09-06

## 2024-09-05 RX ORDER — AMLODIPINE BESYLATE 10 MG/1
2.5 TABLET ORAL DAILY
Refills: 0 | Status: DISCONTINUED | OUTPATIENT
Start: 2024-09-05 | End: 2024-09-06

## 2024-09-05 RX ORDER — ATOGEPANT 60 MG/1
1 TABLET ORAL
Refills: 0 | DISCHARGE

## 2024-09-05 RX ORDER — ISOSORBIDE MONONITRATE 30 MG/1
30 TABLET, EXTENDED RELEASE ORAL DAILY
Refills: 0 | Status: DISCONTINUED | OUTPATIENT
Start: 2024-09-05 | End: 2024-09-06

## 2024-09-05 RX ORDER — MEMANTINE HYDROCHLORIDE AND DONEPEZIL HYDROCHLORIDE 14; 10 MG/1; MG/1
1 CAPSULE ORAL
Qty: 0 | Refills: 0 | DISCHARGE

## 2024-09-05 RX ORDER — FLUTICASONE FUROATE, UMECLIDINIUM BROMIDE AND VILANTEROL TRIFENATATE 200; 62.5; 25 UG/1; UG/1; UG/1
1 POWDER RESPIRATORY (INHALATION)
Refills: 0 | DISCHARGE

## 2024-09-05 RX ORDER — EZETIMIBE 10 MG/1
10 TABLET ORAL DAILY
Refills: 0 | Status: DISCONTINUED | OUTPATIENT
Start: 2024-09-05 | End: 2024-09-06

## 2024-09-05 RX ORDER — ESCITALOPRAM OXALATE 10 MG/1
1 TABLET ORAL
Refills: 0 | DISCHARGE

## 2024-09-05 RX ORDER — RIVAROXABAN 10 MG/1
1 TABLET, FILM COATED ORAL
Qty: 0 | Refills: 0 | DISCHARGE

## 2024-09-05 RX ORDER — PANCRELIPASE 16000; 60500; 57500 [USP'U]/1; [USP'U]/1; [USP'U]/1
1 CAPSULE, DELAYED RELEASE ORAL
Refills: 0 | Status: DISCONTINUED | OUTPATIENT
Start: 2024-09-05 | End: 2024-09-06

## 2024-09-05 RX ORDER — SPIRONOLACTONE 25 MG/1
25 TABLET, FILM COATED ORAL DAILY
Refills: 0 | Status: DISCONTINUED | OUTPATIENT
Start: 2024-09-05 | End: 2024-09-06

## 2024-09-05 RX ORDER — UBROGEPANT 50 MG/1
1 TABLET ORAL
Refills: 0 | DISCHARGE

## 2024-09-05 RX ORDER — HYDROCHLOROTHIAZIDE 12.5 MG/1
1 CAPSULE ORAL
Refills: 0 | DISCHARGE

## 2024-09-05 RX ORDER — TENAPANOR 31.9 MG/1
1 TABLET, FILM COATED ORAL
Refills: 0 | DISCHARGE

## 2024-09-05 RX ORDER — DONEPEZIL HYDROCHLORIDE 5 MG/1
10 TABLET, FILM COATED ORAL AT BEDTIME
Refills: 0 | Status: DISCONTINUED | OUTPATIENT
Start: 2024-09-05 | End: 2024-09-06

## 2024-09-05 RX ORDER — ONDANSETRON 2 MG/ML
4 INJECTION, SOLUTION INTRAMUSCULAR; INTRAVENOUS EVERY 8 HOURS
Refills: 0 | Status: DISCONTINUED | OUTPATIENT
Start: 2024-09-05 | End: 2024-09-06

## 2024-09-05 RX ORDER — ROPINIROLE 2 MG/1
1 TABLET, FILM COATED, EXTENDED RELEASE ORAL AT BEDTIME
Refills: 0 | Status: DISCONTINUED | OUTPATIENT
Start: 2024-09-05 | End: 2024-09-06

## 2024-09-05 RX ORDER — TROSPIUM CHLORIDE 60 MG/1
1 CAPSULE, EXTENDED RELEASE ORAL
Qty: 0 | Refills: 0 | DISCHARGE

## 2024-09-05 RX ORDER — VORTIOXETINE 20 MG/1
1 TABLET, FILM COATED ORAL
Refills: 0 | DISCHARGE

## 2024-09-05 RX ADMIN — PANCRELIPASE 1 CAPSULE(S): 16000; 60500; 57500 CAPSULE, DELAYED RELEASE ORAL at 09:41

## 2024-09-05 RX ADMIN — PANCRELIPASE 1 CAPSULE(S): 16000; 60500; 57500 CAPSULE, DELAYED RELEASE ORAL at 17:49

## 2024-09-05 RX ADMIN — OXYBUTYNIN CHLORIDE 5 MILLIGRAM(S): 5 TABLET ORAL at 09:41

## 2024-09-05 RX ADMIN — CARVEDILOL 12.5 MILLIGRAM(S): 6.25 TABLET ORAL at 09:40

## 2024-09-05 RX ADMIN — DONEPEZIL HYDROCHLORIDE 10 MILLIGRAM(S): 5 TABLET, FILM COATED ORAL at 21:20

## 2024-09-05 RX ADMIN — Medication 3 MILLIGRAM(S): at 21:19

## 2024-09-05 RX ADMIN — ROPINIROLE 1 MILLIGRAM(S): 2 TABLET, FILM COATED, EXTENDED RELEASE ORAL at 21:19

## 2024-09-05 RX ADMIN — EZETIMIBE 10 MILLIGRAM(S): 10 TABLET ORAL at 12:28

## 2024-09-05 RX ADMIN — RIVAROXABAN 20 MILLIGRAM(S): 10 TABLET, FILM COATED ORAL at 17:51

## 2024-09-05 RX ADMIN — SPIRONOLACTONE 25 MILLIGRAM(S): 25 TABLET, FILM COATED ORAL at 09:40

## 2024-09-05 RX ADMIN — CARVEDILOL 12.5 MILLIGRAM(S): 6.25 TABLET ORAL at 21:18

## 2024-09-05 RX ADMIN — BUDESONIDE AND FORMOTEROL FUMARATE 2 PUFF(S): 80; 4.5 AEROSOL, METERED RESPIRATORY (INHALATION) at 21:21

## 2024-09-05 RX ADMIN — OXYBUTYNIN CHLORIDE 5 MILLIGRAM(S): 5 TABLET ORAL at 21:20

## 2024-09-05 RX ADMIN — PANCRELIPASE 1 CAPSULE(S): 16000; 60500; 57500 CAPSULE, DELAYED RELEASE ORAL at 11:39

## 2024-09-05 RX ADMIN — VALSARTAN 320 MILLIGRAM(S): 40 TABLET ORAL at 12:28

## 2024-09-05 RX ADMIN — ROSUVASTATIN CALCIUM 10 MILLIGRAM(S): 10 TABLET ORAL at 21:19

## 2024-09-05 RX ADMIN — Medication 50 MICROGRAM(S): at 05:31

## 2024-09-05 RX ADMIN — BUDESONIDE AND FORMOTEROL FUMARATE 2 PUFF(S): 80; 4.5 AEROSOL, METERED RESPIRATORY (INHALATION) at 09:41

## 2024-09-05 RX ADMIN — Medication 40 MILLIGRAM(S): at 05:32

## 2024-09-05 RX ADMIN — ISOSORBIDE MONONITRATE 30 MILLIGRAM(S): 30 TABLET, EXTENDED RELEASE ORAL at 12:16

## 2024-09-05 RX ADMIN — TIOTROPIUM BROMIDE INHALATION SPRAY 2 PUFF(S): 3.12 SPRAY, METERED RESPIRATORY (INHALATION) at 11:39

## 2024-09-05 RX ADMIN — Medication 75 MILLIGRAM(S): at 12:17

## 2024-09-05 NOTE — H&P ADULT - PROBLEM SELECTOR PLAN 8
h/o otilio s/p uppp, asthma  not on cpap  not on inhalers  currently in no respiratory distress w adequate spo2 at room air  ctm h/o otilio s/p uppp, asthma  not on cpap  trelegy => spiriva + symbicort

## 2024-09-05 NOTE — ED PROVIDER NOTE - NS ED MD DISPO ISOLATION TYPES
Medicare Annual Wellness Visit    Pee Antunez is here for Medicare AWV    Assessment & Plan   Medicare annual wellness visit, subsequent  Recommendations for Preventive Services Due: see orders and patient instructions/AVS.  Recommended screening schedule for the next 5-10 years is provided to the patient in written form: see Patient Instructions/AVS.     No follow-ups on file.     Subjective       Patient's complete Health Risk Assessment and screening values have been reviewed and are found in Flowsheets. The following problems were reviewed today and where indicated follow up appointments were made and/or referrals ordered.    Positive Risk Factor Screenings with Interventions:    Fall Risk:  Do you feel unsteady or are you worried about falling? : (!) yes (walking stick or cane prn)  2 or more falls in past year?: (!) yes  Fall with injury in past year?: no     Interventions:    Patient comments: patient states he has a cane and a walking stick he will use for stability.  Reviewed medications, home hazards, visual acuity, and co-morbidities that can increase risk for falls  See AVS for additional education material           Self-assessment of health:  In general, how would you say your health is?: (!) Poor    Interventions:  See AVS for additional education material-patient was seen by PCP today.     Inactivity:  On average, how many days per week do you engage in moderate to strenuous exercise (like a brisk walk)?: 0 days (!) Abnormal  On average, how many minutes do you engage in exercise at this level?: 0 min  Interventions:  See AVS for additional education material      Dentist Screen:  Have you seen the dentist within the past year?: (!) No (does not want to see a dentist)    Intervention:  Patient comments: states he does not want to see a dentist.  See AVS for additional education material    Hearing Screen:  Do you or your family notice any trouble with your hearing that hasn't been managed with  None

## 2024-09-05 NOTE — H&P ADULT - PROBLEM SELECTOR PLAN 9
h/o tremors  ropinirole  memantine - donepezil h/o tremors, migraines  ropinirole  memantine - donepezil

## 2024-09-05 NOTE — H&P ADULT - HISTORY OF PRESENT ILLNESS
70yo m w pmh obesity, otilio, asthma/copd, htn, hld, cad s/p pci (most recently ~2 weeks ago), afib s/p ablation, gerd, gib, prostate ca s/p prostatectomy c/b incontinence s/p penile prosthesis + urinary sphincter, hypothyroidism, p/w chest pain; in er, c/f acs; admit to medicine for further mgmt 68yo m w pmh obesity, otilio, asthma/copd, htn, hld, cad s/p pci (most recently ~2 weeks ago), afib s/p ablation, gerd, gib, prostate ca s/p prostatectomy c/b incontinence s/p penile prosthesis + urinary sphincter, hypothyroidism, ibs-c, migraines, p/w chest pain; in er, c/f acs; admit to medicine for further mgmt  68yo m w pmh obesity, otilio, asthma/copd, htn, hld, cad s/p pci (most recently ~2 weeks ago), afib s/p ablation, gerd, gib, prostate ca s/p prostatectomy c/b incontinence s/p penile prosthesis + urinary sphincter, hypothyroidism, ibs-c, migraines, p/w chest pain; of note, patient hospitalized 8/23-8/24 for chest pain; underwent lhc and pci w stents to lad and discharged on dapt + xarelto x1 week followed by plavix + xarelto. since then, patient has been having generalized weakness/fatigue + lightheadedness/dizziness, particularly w change in posture. he has noticed that his blood pressure has been on the low side. has also been having worsening chest pain, which he characterizes as different from the chest pain he was experiencing when he underwent lhc. patient grew concerned so presents to [facility] er for further evaluation. in er, c/f acs; admit to medicine for further mgmt

## 2024-09-05 NOTE — H&P ADULT - PROBLEM SELECTOR PLAN 2
h/o afib s/p ablation h/o afib s/p ablation  ekg showing nsr  chadsvasc ~3  monitor on telemetry  cont lopressor  cont xarelto

## 2024-09-05 NOTE — H&P ADULT - ASSESSMENT
Quality 431: Preventive Care And Screening: Unhealthy Alcohol Use - Screening: Patient screened for unhealthy alcohol use using a single question and scores less than 2 times per year Detail Level: Generalized Quality 130: Documentation Of Current Medications In The Medical Record: Current Medications Documented Quality 226: Preventive Care And Screening: Tobacco Use: Screening And Cessation Intervention: Patient screened for tobacco use and is an ex/non-smoker 70yo m w pmh obesity, otilio, asthma/copd, htn, hld, cad s/p pci (most recently ~2 weeks ago), afib s/p ablation, gerd, gib, prostate ca s/p prostatectomy c/b incontinence s/p penile prosthesis + urinary sphincter, hypothyroidism, p/w chest pain; in er, c/f acs; admit to medicine for further mgmt  70yo m w pmh obesity, otilio, asthma/copd, htn, hld, cad s/p pci (most recently ~2 weeks ago), afib s/p ablation, gerd, gib, prostate ca s/p prostatectomy c/b incontinence s/p penile prosthesis + urinary sphincter, hypothyroidism, ibs-c, migraines, p/w chest pain; in er, c/f acs; admit to medicine for further mgmt

## 2024-09-05 NOTE — H&P ADULT - PROBLEM SELECTOR PLAN 6
h/o prostate ca s/p prostatectomy c/b incontinence s/p penile prosthesis + urinary sphincter  trospium => oxybutynin

## 2024-09-05 NOTE — H&P ADULT - NSHPREVIEWOFSYSTEMS_GEN_ALL_CORE
CONSTITUTIONAL: No fever. +generalized weakness/fatigue  ENMT:  No sinus or throat pain  RESPIRATORY: No cough, wheezing, chills or hemoptysis; No shortness of breath  CARDIOVASCULAR: +chest pain, no palpitations, +dizziness, no leg swelling  GASTROINTESTINAL: No abdominal or epigastric pain. No nausea, vomiting, or hematemesis; No diarrhea or constipation. No melena or hematochezia.  GENITOURINARY: No dysuria or incontinence  NEUROLOGICAL: No headaches, memory loss, loss of strength, numbness, or tremors  SKIN: No rashes,  No hives or eczema  ENDOCRINE: No heat or cold intolerance; No hair loss  MUSCULOSKELETAL: No joint pain or swelling; No muscle, back, or extremity pain  PSYCHIATRIC: No depression, anxiety, mood swings, or difficulty sleeping  HEME/LYMPH: No easy bruising, or bleeding gums; no enlarged LN

## 2024-09-05 NOTE — PROVIDER CONTACT NOTE (OTHER) - BACKGROUND
69M w/ PMH of CAD (most recently s/p PCI to LAD 2 weeks ago), HTN, HLD, GERD, AF (s/p ablation 2022) presenting with dizziness and chest pain.

## 2024-09-05 NOTE — H&P ADULT - PROBLEM SELECTOR PLAN 3
hold home apresoline as recommended by cards  cont home coreg, aldactone, imdur, diovan, norvasc, microzide

## 2024-09-05 NOTE — H&P ADULT - NSHPPHYSICALEXAM_GEN_ALL_CORE
T(C): 36.6 (09-05-24 @ 02:00), Max: 36.8 (09-04-24 @ 13:15)  HR: 84 (09-05-24 @ 02:00) (66 - 84)  BP: 144/80 (09-05-24 @ 02:00) (120/80 - 144/80)  RR: 18 (09-05-24 @ 02:00) (16 - 20)  SpO2: 96% (09-05-24 @ 02:00) (95% - 98%)  GENERAL: NAD, lying in bed    EYES: EOMI, PERRLA; conjunctiva and sclera clear  ENMT: Moist oral mucosa, no pharyngeal injection or exudates   NECK: Supple, no palpable masses; no JVD  RESPIRATORY: Normal respiratory effort; lungs are clear to auscultation bilaterally  CARDIOVASCULAR: Regular rate and rhythm, normal S1 and S2, no murmur/rub/gallop; No lower extremity edema; Peripheral pulses are 2+ bilaterally  ABDOMEN: Nontender to palpation, normoactive bowel sounds, no rebound/guarding   MUSCULOSKELETAL: no joint swelling or tenderness to palpation  PSYCH: A+O to person, place, and time; affect appropriate  NEUROLOGY: CN 2-12 are intact and symmetric; no gross motor or sensory deficits   SKIN: No rashes; no palpable lesions

## 2024-09-05 NOTE — PROGRESS NOTE ADULT - NSPROGADDITIONALINFOA_GEN_ALL_CORE
time spent reviewing prior charts, meds, discussing plan with patient= 51 minutes    d/w ACP Cinthia

## 2024-09-05 NOTE — PROVIDER CONTACT NOTE (MEDICATION) - ASSESSMENT
patient is refusing all am medication except synthroid and protonix - states he want them with breakfast

## 2024-09-05 NOTE — PROVIDER CONTACT NOTE (OTHER) - RECOMMENDATIONS
Education provided on importance of labs and meds. Patient verbalized understanding and still refusing.

## 2024-09-05 NOTE — H&P ADULT - PROBLEM SELECTOR PLAN 1
currently wo clinft of acs  ekg shows nsr @ 65/min w 1st degree avb; qtc 420ms; twi in leads I, avl, v4-v6; similar to prior ekg   trop wnl x2  cards consulted, "In stent thrombosis unlikely given clinical stability and lack of ST elevations, pain is also reproducible and of different quality than patient's angina. Suspect patient is symptomatic from iatrogenic hypotension at home due to overmedication.... pull back some antihypertensives."  follow up orthostatic vitals  monitor for chest pain, ekg/telemetry changes  cont plavix, crestor, coreg  cards follow up in am

## 2024-09-06 ENCOUNTER — TRANSCRIPTION ENCOUNTER (OUTPATIENT)
Age: 69
End: 2024-09-06

## 2024-09-06 VITALS
RESPIRATION RATE: 19 BRPM | TEMPERATURE: 98 F | SYSTOLIC BLOOD PRESSURE: 148 MMHG | HEART RATE: 65 BPM | OXYGEN SATURATION: 96 % | DIASTOLIC BLOOD PRESSURE: 96 MMHG

## 2024-09-06 PROCEDURE — 83880 ASSAY OF NATRIURETIC PEPTIDE: CPT

## 2024-09-06 PROCEDURE — 96374 THER/PROPH/DIAG INJ IV PUSH: CPT

## 2024-09-06 PROCEDURE — 85730 THROMBOPLASTIN TIME PARTIAL: CPT

## 2024-09-06 PROCEDURE — 84484 ASSAY OF TROPONIN QUANT: CPT

## 2024-09-06 PROCEDURE — 71275 CT ANGIOGRAPHY CHEST: CPT | Mod: MC

## 2024-09-06 PROCEDURE — 99239 HOSP IP/OBS DSCHRG MGMT >30: CPT

## 2024-09-06 PROCEDURE — 94640 AIRWAY INHALATION TREATMENT: CPT

## 2024-09-06 PROCEDURE — 76937 US GUIDE VASCULAR ACCESS: CPT

## 2024-09-06 PROCEDURE — 85025 COMPLETE CBC W/AUTO DIFF WBC: CPT

## 2024-09-06 PROCEDURE — 82550 ASSAY OF CK (CPK): CPT

## 2024-09-06 PROCEDURE — 85610 PROTHROMBIN TIME: CPT

## 2024-09-06 PROCEDURE — 99285 EMERGENCY DEPT VISIT HI MDM: CPT | Mod: 25

## 2024-09-06 PROCEDURE — 71045 X-RAY EXAM CHEST 1 VIEW: CPT

## 2024-09-06 PROCEDURE — 93308 TTE F-UP OR LMTD: CPT

## 2024-09-06 PROCEDURE — 99231 SBSQ HOSP IP/OBS SF/LOW 25: CPT

## 2024-09-06 PROCEDURE — 80053 COMPREHEN METABOLIC PANEL: CPT

## 2024-09-06 PROCEDURE — 93005 ELECTROCARDIOGRAM TRACING: CPT

## 2024-09-06 PROCEDURE — 85379 FIBRIN DEGRADATION QUANT: CPT

## 2024-09-06 RX ORDER — EZETIMIBE 10 MG/1
1 TABLET ORAL
Qty: 30 | Refills: 0
Start: 2024-09-06 | End: 2024-10-05

## 2024-09-06 RX ORDER — VALSARTAN 40 MG/1
1 TABLET ORAL
Qty: 30 | Refills: 0
Start: 2024-09-06 | End: 2024-10-05

## 2024-09-06 RX ORDER — AMLODIPINE BESYLATE 10 MG/1
1 TABLET ORAL
Qty: 0 | Refills: 0 | DISCHARGE

## 2024-09-06 RX ADMIN — Medication 50 MICROGRAM(S): at 05:22

## 2024-09-06 RX ADMIN — PANCRELIPASE 1 CAPSULE(S): 16000; 60500; 57500 CAPSULE, DELAYED RELEASE ORAL at 08:34

## 2024-09-06 RX ADMIN — EZETIMIBE 10 MILLIGRAM(S): 10 TABLET ORAL at 11:20

## 2024-09-06 RX ADMIN — ISOSORBIDE MONONITRATE 30 MILLIGRAM(S): 30 TABLET, EXTENDED RELEASE ORAL at 11:21

## 2024-09-06 RX ADMIN — OXYBUTYNIN CHLORIDE 5 MILLIGRAM(S): 5 TABLET ORAL at 08:33

## 2024-09-06 RX ADMIN — BUDESONIDE AND FORMOTEROL FUMARATE 2 PUFF(S): 80; 4.5 AEROSOL, METERED RESPIRATORY (INHALATION) at 08:30

## 2024-09-06 RX ADMIN — Medication 75 MILLIGRAM(S): at 11:21

## 2024-09-06 RX ADMIN — CARVEDILOL 12.5 MILLIGRAM(S): 6.25 TABLET ORAL at 08:34

## 2024-09-06 RX ADMIN — TIOTROPIUM BROMIDE INHALATION SPRAY 2 PUFF(S): 3.12 SPRAY, METERED RESPIRATORY (INHALATION) at 11:22

## 2024-09-06 RX ADMIN — Medication 40 MILLIGRAM(S): at 05:23

## 2024-09-06 RX ADMIN — SPIRONOLACTONE 25 MILLIGRAM(S): 25 TABLET, FILM COATED ORAL at 08:34

## 2024-09-06 RX ADMIN — PANCRELIPASE 1 CAPSULE(S): 16000; 60500; 57500 CAPSULE, DELAYED RELEASE ORAL at 11:54

## 2024-09-06 RX ADMIN — VALSARTAN 320 MILLIGRAM(S): 40 TABLET ORAL at 11:20

## 2024-09-06 NOTE — DISCHARGE NOTE NURSING/CASE MANAGEMENT/SOCIAL WORK - NSDCPEFALRISK_GEN_ALL_CORE
For information on Fall & Injury Prevention, visit: [email or web address] OR  [email or web address] OR  https://www.cdc.gov/steadi/patient.html

## 2024-09-06 NOTE — PROGRESS NOTE ADULT - ATTENDING COMMENTS
BP has stabilized; medications streamlined. F/u with Dr. Liz.
69M with CAD, recent PCI LAD, HTN, HLD, AF (s/p ablation 2022) presenting with dizziness with variable BP's. No evidence for AMI. Monitor BP and adjust medications.

## 2024-09-06 NOTE — PROGRESS NOTE ADULT - PROBLEM SELECTOR PLAN 2
h/o afib s/p ablation  ekg showing nsr  chadsvasc ~3  monitor on telemetry  cont lopressor  cont xarelto
h/o afib s/p ablation  ekg showing nsr  chadsvasc ~3  monitor on telemetry  cont lopressor  cont xarelto

## 2024-09-06 NOTE — DISCHARGE NOTE PROVIDER - HOSPITAL COURSE
HPI  70yo m w pmh obesity, otilio, asthma/copd, htn, hld, cad s/p pci (most recently ~2 weeks ago), afib s/p ablation, gerd, gib, prostate ca s/p prostatectomy c/b incontinence s/p penile prosthesis + urinary sphincter, hypothyroidism, ibs-c, migraines, p/w chest pain; of note, patient hospitalized 8/23-8/24 for chest pain; underwent lhc and pci w stents to lad and discharged on dapt + xarelto x1 week followed by plavix + xarelto. since then, patient has been having generalized weakness/fatigue + lightheadedness/dizziness, particularly w change in posture. he has noticed that his blood pressure has been on the low side. has also been having worsening chest pain, which he characterizes as different from the chest pain he was experiencing when he underwent lhc. patient grew concerned so presents to [facility] er for further evaluation. in er, c/f acs; admit to medicine for further mgmt     Hospital Course  Admitted to medicine service for further work-up and monitoring of chest pain w/ concerns for ACS. Cardiology consulted, believe chest pain 2/2 to stent thrombosis unlikely given clinical stability and lack of ST elevations, pain is also reproducible and of different quality than patient's angina. Suspect patient is symptomatic from iatrogenic hypotension at home due to overmedication. Work-up unremarkable, trops wnl and no ischemic changes seen on imaging. No medication changes made in patient. Since chest pain resolved and work-up unremarkable, discharge with outpatient follow-up with cardiology.     On day of discharge, patient is clinically stable with no new exam findings or acute symptoms compared to prior. The patient was seen by the attending physician on the date of discharge and deemed stable and acceptable for discharge. The patient's chronic medical conditions were treated accordingly per the patient's home medication regimen. The patient's medication reconciliation (with changes made to chronic medications), follow up appointments, discharge orders, instructions, and significant lab and diagnostic studies are as noted.    Important Medication Changes and Reasons  -Medications stopped:   -Medications started:     Active or Pending Issues Requiring Follow-up  [ ] F/u w/ cardiology for further management     Advanced Directives  Full Code    Discharge Diagnosis   Chest pain HPI  70yo m w pmh obesity, otilio, asthma/copd, htn, hld, cad s/p pci (most recently ~2 weeks ago), afib s/p ablation, gerd, gib, prostate ca s/p prostatectomy c/b incontinence s/p penile prosthesis + urinary sphincter, hypothyroidism, ibs-c, migraines, p/w chest pain; of note, patient hospitalized 8/23-8/24 for chest pain; underwent lhc and pci w stents to lad and discharged on dapt + xarelto x1 week followed by plavix + xarelto. since then, patient has been having generalized weakness/fatigue + lightheadedness/dizziness, particularly w change in posture. he has noticed that his blood pressure has been on the low side. has also been having worsening chest pain, which he characterizes as different from the chest pain he was experiencing when he underwent lhc. patient grew concerned so presents to [facility] er for further evaluation. in er, c/f acs; admit to medicine for further mgmt     Hospital Course  Admitted to medicine service for further work-up and monitoring of chest pain w/ concerns for ACS. Cardiology consulted, believe chest pain 2/2 to stent thrombosis unlikely given clinical stability and lack of ST elevations, pain is also reproducible and of different quality than patient's angina. Suspect patient is symptomatic from iatrogenic hypotension at home due to overmedication. Work-up unremarkable, trops wnl and no ischemic changes seen on imaging. Home hydralazine and amlodipine dc'd and Valsartan 320mg started. No medication changes made in patient. Since chest pain resolved and work-up unremarkable, discharge with outpatient follow-up with cardiology.     On day of discharge, patient is clinically stable with no new exam findings or acute symptoms compared to prior. The patient was seen by the attending physician on the date of discharge and deemed stable and acceptable for discharge. The patient's chronic medical conditions were treated accordingly per the patient's home medication regimen. The patient's medication reconciliation (with changes made to chronic medications), follow up appointments, discharge orders, instructions, and significant lab and diagnostic studies are as noted.    Important Medication Changes and Reasons  -Medications stopped: Hydralazine and Amlodipine   -Medications started: Valsartan 320mg     Active or Pending Issues Requiring Follow-up  [ ] F/u w/ cardiology for further management     Advanced Directives  Full Code    Discharge Diagnosis   Chest pain

## 2024-09-06 NOTE — PROGRESS NOTE ADULT - PROBLEM SELECTOR PLAN 3
BP noted to be elevated  holding home hydralazine as recommended by Cards  cont home coreg, aldactone  however pt is refusing Norvasc ; d/lois  started on Valsartan and Imdur per Cardiology reccs
BP noted to be elevated  holding home hydralazine as recommended by Cards  cont home coreg, aldactone, imdur, diovan, norvasc  however pt is refusing Norvasc  Will start Imdur 30mg po qd per Cardiology reccs

## 2024-09-06 NOTE — DISCHARGE NOTE PROVIDER - CARE PROVIDER_API CALL
Marilee Avelar  Internal Medicine  [address] [facility] [address] [address]  Phone: [phone]  Fax: [phone]  Established Patient  Follow Up Time: 1 week

## 2024-09-06 NOTE — DISCHARGE NOTE PROVIDER - NSDCCPCAREPLAN_GEN_ALL_CORE_FT
PRINCIPAL DISCHARGE DIAGNOSIS  Diagnosis: Acute chest pain  Assessment and Plan of Treatment: You were admitted to the hospital for further workup of chest pain you experienced. Cardiology was consulted to rule out concerns for Myocardial Infarction, otherwise known as Heart Attack. Low concerns and believe chest pain may have been due to low blood pressure. Work-up in the hospital including heart imaging and lab work has been unremarkable. Please follow-up with your Cardiologist for further management.     PRINCIPAL DISCHARGE DIAGNOSIS  Diagnosis: Acute chest pain  Assessment and Plan of Treatment: You were admitted to the hospital for further workup of chest pain you experienced. Cardiology was consulted to rule out concerns for Myocardial Infarction, otherwise known as Heart Attack. Low concerns and believe chest pain may have been due to low blood pressure from your blood pressure medications. It was determined to stop your home hydralazine and amlodipine and start Valsartan 320mg daily.  Work-up in the hospital including heart imaging and lab work has been unremarkable. Please follow-up with your Cardiologist for further management.

## 2024-09-06 NOTE — PROGRESS NOTE ADULT - PROBLEM SELECTOR PLAN 6
h/o prostate ca s/p prostatectomy c/b incontinence s/p penile prosthesis + urinary sphincter  trospium => oxybutynin
h/o prostate ca s/p prostatectomy c/b incontinence s/p penile prosthesis + urinary sphincter  trospium => oxybutynin

## 2024-09-06 NOTE — PROGRESS NOTE ADULT - PROBLEM SELECTOR PLAN 8
h/o otilio s/p uppp, asthma  not on cpap  trelegy => spiriva + symbicort
h/o otilio s/p uppp, asthma  not on cpap  trelegy => spiriva + symbicort

## 2024-09-06 NOTE — DISCHARGE NOTE NURSING/CASE MANAGEMENT/SOCIAL WORK - PATIENT PORTAL LINK FT
You can access the FollowMyHealth Patient Portal offered by [facility] by registering at the following website: [email or web address]. By joining [facility]’s FollowMyHealth portal, you will also be able to view your health information using other applications (apps) compatible with our system.

## 2024-09-06 NOTE — DISCHARGE NOTE NURSING/CASE MANAGEMENT/SOCIAL WORK - NSDCFUADDAPPT_GEN_ALL_CORE_FT
APPTS ARE READY TO BE MADE: [x] YES    Best Family or Patient Contact (if needed): Self     Additional Information about above appointments (if needed):    1: F/u with Rosio in 1 week post-discharge    Other comments or requests:

## 2024-09-06 NOTE — PROGRESS NOTE ADULT - PROBLEM SELECTOR PLAN 9
h/o tremors, migraines  c/w ropinirole  c/w memantine - donepezil
h/o tremors, migraines  ropinirole  memantine - donepezil

## 2024-09-06 NOTE — DISCHARGE NOTE PROVIDER - NSDCFUADDAPPT_GEN_ALL_CORE_FT
APPTS ARE READY TO BE MADE: [x] YES    Best Family or Patient Contact (if needed): Self     Additional Information about above appointments (if needed):    1: F/u with Rosio in 1 week post-discharge    Other comments or requests:    APPTS ARE READY TO BE MADE: [x] YES    Best Family or Patient Contact (if needed): Self     Additional Information about above appointments (if needed):    1: F/u with Rosio in 1 week post-discharge    Other comments or requests:   Patient informed us they already have secured a follow up appointment which is not visible on [facility]. Marilee De La Rosa on 9/16

## 2024-09-06 NOTE — DISCHARGE NOTE PROVIDER - NSDCMRMEDTOKEN_GEN_ALL_CORE_FT
amLODIPine 2.5 mg oral tablet: 1 tab(s) orally once a day  carvedilol 12.5 mg oral tablet: 1 tab(s) orally every 12 hours  clopidogrel 75 mg oral tablet: 1 tab(s) orally once a day  DULoxetine 60 mg oral delayed release capsule: 1 cap(s) orally once a day  ezetimibe 10 mg oral tablet: 1 tab(s) orally once a day  hydrALAZINE 25 mg oral tablet: 1 tab(s) orally 3 times a day  hydroCHLOROthiazide 25 mg oral tablet: 1 tab(s) orally once a day  Ibsrela 50 mg oral tablet: 1 tab(s) orally once a day  isosorbide mononitrate 30 mg oral tablet, extended release: 1 tab(s) orally once a day  levothyroxine 50 mcg (0.05 mg) oral tablet: 1 tab(s) orally once a day  Namzaric 28 mg-10 mg oral capsule, extended release: 1 cap(s) orally once a day  pancrelipase 36,000 units-114,000 units-180,000 units oral delayed release capsule: 1 cap(s) orally 3 times a day (with meals)  pantoprazole 40 mg oral delayed release tablet: 1 tab(s) orally once a day (before a meal)  PRALUENT 2PK 150MG/ML PEN:   Qulipta 60 mg oral tablet: 1 tab(s) orally once a day  rOPINIRole 1 mg oral tablet: 1 tab(s) orally once a day (at bedtime)  rosuvastatin 10 mg oral tablet: 1 tab(s) orally once a day (at bedtime)  spironolactone 25 mg oral tablet: 1 tab(s) orally once a day  Trelegy Ellipta 200 mcg-62.5 mcg-25 mcg/inh inhalation powder: 1 puff(s) inhaled once a day  trospium 20 mg oral tablet: 1 tab(s) orally once a day  Trulance 3 mg oral tablet: 1 tab(s) orally once a day  Ubrelvy 100 mg oral tablet: 1 tab(s) orally once a day  valsartan 320 mg oral tablet: 1 tab(s) orally once a day  Xarelto 20 mg oral tablet: 1 tab(s) orally once a day (at bedtime) can resume 8/24   carvedilol 12.5 mg oral tablet: 1 tab(s) orally every 12 hours  clopidogrel 75 mg oral tablet: 1 tab(s) orally once a day  DULoxetine 60 mg oral delayed release capsule: 1 cap(s) orally once a day  ezetimibe 10 mg oral tablet: 1 tab(s) orally once a day  ezetimibe 10 mg oral tablet: 1 tab(s) orally once a day  hydroCHLOROthiazide 25 mg oral tablet: 1 tab(s) orally once a day  Ibsrela 50 mg oral tablet: 1 tab(s) orally once a day  isosorbide mononitrate 30 mg oral tablet, extended release: 1 tab(s) orally once a day  levothyroxine 50 mcg (0.05 mg) oral tablet: 1 tab(s) orally once a day  Namzaric 28 mg-10 mg oral capsule, extended release: 1 cap(s) orally once a day  pancrelipase 36,000 units-114,000 units-180,000 units oral delayed release capsule: 1 cap(s) orally 3 times a day (with meals)  pantoprazole 40 mg oral delayed release tablet: 1 tab(s) orally once a day (before a meal)  PRALUENT 2PK 150MG/ML PEN:   Qulipta 60 mg oral tablet: 1 tab(s) orally once a day  rOPINIRole 1 mg oral tablet: 1 tab(s) orally once a day (at bedtime)  rosuvastatin 10 mg oral tablet: 1 tab(s) orally once a day (at bedtime)  spironolactone 25 mg oral tablet: 1 tab(s) orally once a day  Trelegy Ellipta 200 mcg-62.5 mcg-25 mcg/inh inhalation powder: 1 puff(s) inhaled once a day  trospium 20 mg oral tablet: 1 tab(s) orally once a day  Trulance 3 mg oral tablet: 1 tab(s) orally once a day  Ubrelvy 100 mg oral tablet: 1 tab(s) orally once a day  valsartan 320 mg oral tablet: 1 tab(s) orally once a day  Xarelto 20 mg oral tablet: 1 tab(s) orally once a day (at bedtime) can resume 8/24

## 2024-09-06 NOTE — PROGRESS NOTE ADULT - SUBJECTIVE AND OBJECTIVE BOX
Patient seen and examined at bedside.    Overnight Events: NAEON    REVIEW OF SYSTEMS:  CONSTITUTIONAL: No weakness, fevers or chills  EYES/ENT: No visual changes;  No dysphagia  NECK: No pain or stiffness  RESPIRATORY: No cough, wheezing, hemoptysis; No shortness of breath  CARDIOVASCULAR: No chest pain or palpitations; No lower extremity edema  GASTROINTESTINAL: No abdominal or epigastric pain. No nausea, vomiting, or hematemesis; No diarrhea or constipation. No melena or hematochezia.  BACK: No back pain  GENITOURINARY: No dysuria, frequency or hematuria  NEUROLOGICAL: No numbness or weakness  SKIN: No itching, burning, rashes, or lesions   All other review of systems is negative unless indicated above.            Current Meds:  acetaminophen     Tablet .. 650 milliGRAM(s) Oral every 6 hours PRN  aluminum hydroxide/magnesium hydroxide/simethicone Suspension 30 milliLiter(s) Oral every 4 hours PRN  amLODIPine   Tablet 2.5 milliGRAM(s) Oral daily  budesonide 160 MICROgram(s)/formoterol 4.5 MICROgram(s) Inhaler 2 Puff(s) Inhalation two times a day  carvedilol 12.5 milliGRAM(s) Oral every 12 hours  clopidogrel Tablet 75 milliGRAM(s) Oral daily  donepezil 10 milliGRAM(s) Oral at bedtime  ezetimibe 10 milliGRAM(s) Oral daily  hydrochlorothiazide 25 milliGRAM(s) Oral daily  isosorbide   mononitrate ER Tablet (IMDUR) 30 milliGRAM(s) Oral daily  levothyroxine 50 MICROGram(s) Oral daily  melatonin 3 milliGRAM(s) Oral at bedtime PRN  ondansetron Injectable 4 milliGRAM(s) IV Push every 8 hours PRN  oxybutynin 5 milliGRAM(s) Oral two times a day  pancrelipase  (CREON 36,000 Lipase Units) 1 Capsule(s) Oral three times a day with meals  pantoprazole    Tablet 40 milliGRAM(s) Oral before breakfast  rivaroxaban 20 milliGRAM(s) Oral with dinner  rOPINIRole 1 milliGRAM(s) Oral at bedtime  rosuvastatin 10 milliGRAM(s) Oral at bedtime  spironolactone 25 milliGRAM(s) Oral daily  tiotropium 2.5 MICROgram(s) Inhaler 2 Puff(s) Inhalation daily  valsartan 320 milliGRAM(s) Oral daily      Vitals:  T(F): 98.1 (09-06), Max: 98.1 (09-06)  HR: 59 (09-06) (59 - 92)  BP: 120/64 (09-06) (112/65 - 165/110)  RR: 18 (09-06)  SpO2: 96% (09-06)  I&O's Summary    05 Sep 2024 07:01  -  06 Sep 2024 06:40  --------------------------------------------------------  IN: 240 mL / OUT: 0 mL / NET: 240 mL        Physical Exam:  GEN: NAD  HEENT: EOMI, clear sclera  PULM: CTA b/l, no wheeze  CV: RRR S1 S2, no murmur, no JVD  ABD: S, NT, ND  EXT: WWP, no edema  PSYCH: normal affect  SKIN: No rash                          16.2   8.34  )-----------( 210      ( 04 Sep 2024 15:05 )             47.7     09-05    139  |  106  |  20  ----------------------------<  122<H>  4.1   |  19<L>  |  0.95    Ca    11.1<H>      05 Sep 2024 06:29    TPro  7.3  /  Alb  4.1  /  TBili  0.3  /  DBili  x   /  AST  17  /  ALT  22  /  AlkPhos  78  09-05    PT/INR - ( 04 Sep 2024 15:05 )   PT: 20.4 sec;   INR: 1.98 ratio         PTT - ( 04 Sep 2024 15:05 )  PTT:27.6 sec  
Patient seen and examined at bedside.    Overnight Events: NAEON    REVIEW OF SYSTEMS:  CONSTITUTIONAL: No weakness, fevers or chills  EYES/ENT: No visual changes;  No dysphagia  NECK: No pain or stiffness  RESPIRATORY: No cough, wheezing, hemoptysis; No shortness of breath  CARDIOVASCULAR: No chest pain or palpitations; No lower extremity edema  GASTROINTESTINAL: No abdominal or epigastric pain. No nausea, vomiting, or hematemesis; No diarrhea or constipation. No melena or hematochezia.  BACK: No back pain  GENITOURINARY: No dysuria, frequency or hematuria  NEUROLOGICAL: No numbness or weakness  SKIN: No itching, burning, rashes, or lesions   All other review of systems is negative unless indicated above.            Current Meds:  acetaminophen     Tablet .. 650 milliGRAM(s) Oral every 6 hours PRN  aluminum hydroxide/magnesium hydroxide/simethicone Suspension 30 milliLiter(s) Oral every 4 hours PRN  amLODIPine   Tablet 2.5 milliGRAM(s) Oral daily  budesonide 160 MICROgram(s)/formoterol 4.5 MICROgram(s) Inhaler 2 Puff(s) Inhalation two times a day  carvedilol 12.5 milliGRAM(s) Oral every 12 hours  clopidogrel Tablet 75 milliGRAM(s) Oral daily  donepezil 10 milliGRAM(s) Oral at bedtime  ezetimibe 10 milliGRAM(s) Oral daily  hydrochlorothiazide 25 milliGRAM(s) Oral daily  isosorbide   mononitrate ER Tablet (IMDUR) 30 milliGRAM(s) Oral daily  levothyroxine 50 MICROGram(s) Oral daily  melatonin 3 milliGRAM(s) Oral at bedtime PRN  ondansetron Injectable 4 milliGRAM(s) IV Push every 8 hours PRN  oxybutynin 5 milliGRAM(s) Oral two times a day  pancrelipase  (CREON 36,000 Lipase Units) 1 Capsule(s) Oral three times a day with meals  pantoprazole    Tablet 40 milliGRAM(s) Oral before breakfast  rivaroxaban 20 milliGRAM(s) Oral with dinner  rOPINIRole 1 milliGRAM(s) Oral at bedtime  rosuvastatin 10 milliGRAM(s) Oral at bedtime  spironolactone 25 milliGRAM(s) Oral daily  tiotropium 2.5 MICROgram(s) Inhaler 2 Puff(s) Inhalation daily  valsartan 320 milliGRAM(s) Oral daily      Vitals:  T(F): 97.8 (09-05), Max: 98.3 (09-04)  HR: 80 (09-05) (66 - 84)  BP: 171/98 (09-05) (120/80 - 171/98)  RR: 18 (09-05)  SpO2: 95% (09-05)  I&O's Summary      Physical Exam:  GEN: NAD  HEENT: EOMI, clear sclera  PULM: CTA b/l, no wheeze  CV: RRR S1 S2, no murmur, no JVD  ABD: S, NT, ND  EXT: WWP, no edema  PSYCH: normal affect  SKIN: No rash                          16.2   8.34  )-----------( 210      ( 04 Sep 2024 15:05 )             47.7     09-04    141  |  107  |  17  ----------------------------<  84  4.4   |  21<L>  |  1.03    Ca    10.2      04 Sep 2024 15:05    TPro  6.8  /  Alb  3.8  /  TBili  0.3  /  DBili  x   /  AST  29  /  ALT  20  /  AlkPhos  83  09-04    PT/INR - ( 04 Sep 2024 15:05 )   PT: 20.4 sec;   INR: 1.98 ratio         PTT - ( 04 Sep 2024 15:05 )  PTT:27.6 sec    
Patient is a 69y old  Male who presents with a chief complaint of chest pain (06 Sep 2024 12:56)      SUBJECTIVE / OVERNIGHT EVENTS:  Pt seen and examined. No acute events overnight. He denies CP, SOB.    MEDICATIONS  (STANDING):  amLODIPine   Tablet 2.5 milliGRAM(s) Oral daily  budesonide 160 MICROgram(s)/formoterol 4.5 MICROgram(s) Inhaler 2 Puff(s) Inhalation two times a day  carvedilol 12.5 milliGRAM(s) Oral every 12 hours  clopidogrel Tablet 75 milliGRAM(s) Oral daily  donepezil 10 milliGRAM(s) Oral at bedtime  ezetimibe 10 milliGRAM(s) Oral daily  hydrochlorothiazide 25 milliGRAM(s) Oral daily  isosorbide   mononitrate ER Tablet (IMDUR) 30 milliGRAM(s) Oral daily  levothyroxine 50 MICROGram(s) Oral daily  oxybutynin 5 milliGRAM(s) Oral two times a day  pancrelipase  (CREON 36,000 Lipase Units) 1 Capsule(s) Oral three times a day with meals  pantoprazole    Tablet 40 milliGRAM(s) Oral before breakfast  rivaroxaban 20 milliGRAM(s) Oral with dinner  rOPINIRole 1 milliGRAM(s) Oral at bedtime  rosuvastatin 10 milliGRAM(s) Oral at bedtime  spironolactone 25 milliGRAM(s) Oral daily  tiotropium 2.5 MICROgram(s) Inhaler 2 Puff(s) Inhalation daily  valsartan 320 milliGRAM(s) Oral daily    MEDICATIONS  (PRN):  acetaminophen     Tablet .. 650 milliGRAM(s) Oral every 6 hours PRN Temp greater or equal to 38C (100.4F), Mild Pain (1 - 3)  aluminum hydroxide/magnesium hydroxide/simethicone Suspension 30 milliLiter(s) Oral every 4 hours PRN Dyspepsia  melatonin 3 milliGRAM(s) Oral at bedtime PRN Insomnia  ondansetron Injectable 4 milliGRAM(s) IV Push every 8 hours PRN Nausea and/or Vomiting      Vital Signs Last 24 Hrs  T(C): 36.4 (06 Sep 2024 11:16), Max: 36.7 (06 Sep 2024 04:20)  T(F): 97.6 (06 Sep 2024 11:16), Max: 98.1 (06 Sep 2024 04:20)  HR: 65 (06 Sep 2024 11:16) (59 - 72)  BP: 148/96 (06 Sep 2024 11:16) (112/65 - 148/96)  BP(mean): --  RR: 19 (06 Sep 2024 11:16) (18 - 19)  SpO2: 96% (06 Sep 2024 11:16) (94% - 96%)    Parameters below as of 06 Sep 2024 11:16  Patient On (Oxygen Delivery Method): room air      CAPILLARY BLOOD GLUCOSE        I&O's Summary    05 Sep 2024 07:01  -  06 Sep 2024 07:00  --------------------------------------------------------  IN: 240 mL / OUT: 0 mL / NET: 240 mL    06 Sep 2024 07:01  -  06 Sep 2024 15:01  --------------------------------------------------------  IN: 300 mL / OUT: 600 mL / NET: -300 mL        PHYSICAL EXAM:  GENERAL: NAD, well-groomed  HEAD:  Atraumatic, Normocephalic  EYES:  conjunctiva and sclera clear  NECK: Supple, No JVD  CHEST/LUNG: Clear to auscultation bilaterally; No wheeze  HEART: Regular rate and rhythm; No murmurs, rubs, or gallops  ABDOMEN: Soft, Nontender, Nondistended; Bowel sounds present  EXTREMITIES:  2+ Peripheral Pulses, No clubbing, cyanosis, or edema  PSYCH: AAOx3  NEUROLOGY: non-focal  SKIN: No rashes or lesions    LABS:                        16.2   8.34  )-----------( 210      ( 04 Sep 2024 15:05 )             47.7     09-05    139  |  106  |  20  ----------------------------<  122<H>  4.1   |  19<L>  |  0.95    Ca    11.1<H>      05 Sep 2024 06:29    TPro  7.3  /  Alb  4.1  /  TBili  0.3  /  DBili  x   /  AST  17  /  ALT  22  /  AlkPhos  78  09-05    PT/INR - ( 04 Sep 2024 15:05 )   PT: 20.4 sec;   INR: 1.98 ratio         PTT - ( 04 Sep 2024 15:05 )  PTT:27.6 sec      Urinalysis Basic - ( 05 Sep 2024 06:29 )    Color: x / Appearance: x / SG: x / pH: x  Gluc: 122 mg/dL / Ketone: x  / Bili: x / Urobili: x   Blood: x / Protein: x / Nitrite: x   Leuk Esterase: x / RBC: x / WBC x   Sq Epi: x / Non Sq Epi: x / Bacteria: x          Consultant(s) Notes Reviewed:  Cardiology    
Patient is a 69y old  Male who presents with a chief complaint of chest pain (05 Sep 2024 13:26)      SUBJECTIVE / OVERNIGHT EVENTS:  Pt seen and examined. No acute events overnight. Pt c/o CP, denies SOB.    MEDICATIONS  (STANDING):  amLODIPine   Tablet 2.5 milliGRAM(s) Oral daily  budesonide 160 MICROgram(s)/formoterol 4.5 MICROgram(s) Inhaler 2 Puff(s) Inhalation two times a day  carvedilol 12.5 milliGRAM(s) Oral every 12 hours  clopidogrel Tablet 75 milliGRAM(s) Oral daily  donepezil 10 milliGRAM(s) Oral at bedtime  ezetimibe 10 milliGRAM(s) Oral daily  hydrochlorothiazide 25 milliGRAM(s) Oral daily  isosorbide   mononitrate ER Tablet (IMDUR) 30 milliGRAM(s) Oral daily  levothyroxine 50 MICROGram(s) Oral daily  oxybutynin 5 milliGRAM(s) Oral two times a day  pancrelipase  (CREON 36,000 Lipase Units) 1 Capsule(s) Oral three times a day with meals  pantoprazole    Tablet 40 milliGRAM(s) Oral before breakfast  rivaroxaban 20 milliGRAM(s) Oral with dinner  rOPINIRole 1 milliGRAM(s) Oral at bedtime  rosuvastatin 10 milliGRAM(s) Oral at bedtime  spironolactone 25 milliGRAM(s) Oral daily  tiotropium 2.5 MICROgram(s) Inhaler 2 Puff(s) Inhalation daily  valsartan 320 milliGRAM(s) Oral daily    MEDICATIONS  (PRN):  acetaminophen     Tablet .. 650 milliGRAM(s) Oral every 6 hours PRN Temp greater or equal to 38C (100.4F), Mild Pain (1 - 3)  aluminum hydroxide/magnesium hydroxide/simethicone Suspension 30 milliLiter(s) Oral every 4 hours PRN Dyspepsia  melatonin 3 milliGRAM(s) Oral at bedtime PRN Insomnia  ondansetron Injectable 4 milliGRAM(s) IV Push every 8 hours PRN Nausea and/or Vomiting      Vital Signs Last 24 Hrs  T(C): 36.6 (05 Sep 2024 21:08), Max: 36.7 (05 Sep 2024 00:44)  T(F): 97.9 (05 Sep 2024 21:08), Max: 98 (05 Sep 2024 00:44)  HR: 59 (05 Sep 2024 21:08) (59 - 92)  BP: 112/65 (05 Sep 2024 21:08) (112/65 - 171/98)  BP(mean): --  RR: 18 (05 Sep 2024 21:08) (17 - 19)  SpO2: 94% (05 Sep 2024 21:08) (94% - 98%)    Parameters below as of 05 Sep 2024 21:08  Patient On (Oxygen Delivery Method): room air      CAPILLARY BLOOD GLUCOSE        I&O's Summary    05 Sep 2024 07:01  -  05 Sep 2024 21:38  --------------------------------------------------------  IN: 240 mL / OUT: 0 mL / NET: 240 mL        PHYSICAL EXAM:  GENERAL: NAD, well-groomed  HEAD:  Atraumatic, Normocephalic  EYES: EOMI, PERRLA, conjunctiva and sclera clear  NECK: Supple, No JVD  CHEST/LUNG: Clear to auscultation bilaterally; No wheeze  HEART: Regular rate and rhythm; No murmurs, rubs, or gallops  ABDOMEN: Soft, Nontender, Nondistended; Bowel sounds present  EXTREMITIES:  2+ Peripheral Pulses, No clubbing, cyanosis, or edema  PSYCH: AAOx3  NEUROLOGY: non-focal  SKIN: No rashes or lesions    LABS:                        16.2   8.34  )-----------( 210      ( 04 Sep 2024 15:05 )             47.7     09-05    139  |  106  |  20  ----------------------------<  122<H>  4.1   |  19<L>  |  0.95    Ca    11.1<H>      05 Sep 2024 06:29    TPro  7.3  /  Alb  4.1  /  TBili  0.3  /  DBili  x   /  AST  17  /  ALT  22  /  AlkPhos  78  09-05    PT/INR - ( 04 Sep 2024 15:05 )   PT: 20.4 sec;   INR: 1.98 ratio         PTT - ( 04 Sep 2024 15:05 )  PTT:27.6 sec      Urinalysis Basic - ( 05 Sep 2024 06:29 )    Color: x / Appearance: x / SG: x / pH: x  Gluc: 122 mg/dL / Ketone: x  / Bili: x / Urobili: x   Blood: x / Protein: x / Nitrite: x   Leuk Esterase: x / RBC: x / WBC x   Sq Epi: x / Non Sq Epi: x / Bacteria: x          Consultant(s) Notes Reviewed:  Cardiology

## 2024-09-06 NOTE — PROGRESS NOTE ADULT - ASSESSMENT
69M w/ PMH of CAD (most recently s/p PCI to LAD 2 weeks ago), HTN, HLD, GERD, AF (s/p ablation 2022) presenting with dizziness and chest pain. In stent thrombosis unlikely given clinical stability and lack of ST elevations, pain is also reproducible and of different quality than patient's angina. Suspect patient is symptomatic from iatrogenic hypotension at home due to overmedication. Will pull back some antihypertensives. TTE w/ preserved EF.    Recs:  -c/w plavix, xarelto 20mg, rosuva 10, ezetimibe 10  -c/w coreg 12.5, HCTZ 25, valsartan 320, amlo 2.5  -continue fernando 25, imdur 30  -keep holding hydralazine  -monitor BPs overnight  -serial EKGs and troponins, if chest pain syndrome changes or worsens, please reach out
Teaching and instructions regarding NPO. Nothing to eat or drink after MN. Patient verbalized understanding. No further questions, request or complaints when asked. NPO sign posted on door frame, next to room number.
 69M w/ PMH of CAD (most recently s/p PCI to LAD 2 weeks ago), HTN, HLD, GERD, AF (s/p ablation 2022) presenting with dizziness and chest pain. In stent thrombosis unlikely given clinical stability and lack of ST elevations, pain is also reproducible and of different quality than patient's angina. Suspect patient is symptomatic from iatrogenic hypotension at home due to overmedication. BP well controlled after stopping hydralazine.    Recs:  -c/w plavix, xarelto 20mg, rosuva 10, ezetimibe 10  -c/w coreg 12.5, HCTZ 25, valsartan 320  -continue fernando 25, imdur 30  -stop amlodipine as patient not taking here  -no hydralazine on discharge  -Cardiology will sign off, please reach out with questions
68yo m w pmh obesity, otilio, asthma/copd, htn, hld, cad s/p pci (most recently ~2 weeks ago), afib s/p ablation, gerd, gib, prostate ca s/p prostatectomy c/b incontinence s/p penile prosthesis + urinary sphincter, hypothyroidism, ibs-c, migraines, p/w chest pain; in er, c/f acs; admit to medicine for further mgmt 
68yo m w pmh obesity, otilio, asthma/copd, htn, hld, cad s/p pci (most recently ~2 weeks ago), afib s/p ablation, gerd, gib, prostate ca s/p prostatectomy c/b incontinence s/p penile prosthesis + urinary sphincter, hypothyroidism, ibs-c, migraines, p/w chest pain; in er, c/f acs; admit to medicine for further mgmt

## 2024-09-06 NOTE — PROGRESS NOTE ADULT - PROBLEM SELECTOR PLAN 1
currently without clinical features of ACS  ekg shows nsr @ 65/min w 1st degree avb; qtc 420ms; twi in leads I, avl, v4-v6; similar to prior ekg   trop wnl x2  cards consulted, "In stent thrombosis unlikely given clinical stability and lack of ST elevations, pain is also reproducible and of different quality than patient's angina. Suspect patient is symptomatic from iatrogenic hypotension at home due to overmedication.... pull back some antihypertensives."  follow up orthostatic vitals  monitor for chest pain, ekg/telemetry changes  cont plavix, crestor, coreg
currently without clinical features of ACS  ekg shows nsr @ 65/min w 1st degree avb; qtc 420ms; twi in leads I, avl, v4-v6; similar to prior ekg   trop wnl x2  Cards reccd appreciated ; in stent thrombosis unlikely given clinical stability and lack of ST elevations. Suspect patient is symptomatic from iatrogenic hypotension at home due to overmedication.  hydralazine and amlodipine d/lois  orthostatic negative  monitor for chest pain, ekg/telemetry changes  cont plavix, crestor, coreg

## 2024-09-06 NOTE — PROGRESS NOTE ADULT - TIME BILLING
case complexity and discharge planning. Pt is clinically stable for discharge home. He is to f/up with Cardiology in 1-2 weeks.    d/w Dr Alejandro

## 2024-10-02 ENCOUNTER — RX RENEWAL (OUTPATIENT)
Age: 69
End: 2024-10-02

## 2024-11-01 ENCOUNTER — EMERGENCY (EMERGENCY)
Facility: HOSPITAL | Age: 69
LOS: 1 days | Discharge: HOME | End: 2024-11-01
Attending: EMERGENCY MEDICINE
Payer: MEDICARE

## 2024-11-01 VITALS
HEART RATE: 60 BPM | HEIGHT: 65 IN | WEIGHT: 220.02 LBS | RESPIRATION RATE: 17 BRPM | SYSTOLIC BLOOD PRESSURE: 105 MMHG | TEMPERATURE: 98 F | OXYGEN SATURATION: 96 % | DIASTOLIC BLOOD PRESSURE: 70 MMHG

## 2024-11-01 DIAGNOSIS — Z95.5 PRESENCE OF CORONARY ANGIOPLASTY IMPLANT AND GRAFT: Chronic | ICD-10-CM

## 2024-11-01 LAB
BASOPHILS # BLD AUTO: 0.06 K/UL — SIGNIFICANT CHANGE UP (ref 0–0.2)
BASOPHILS NFR BLD AUTO: 0.5 % — SIGNIFICANT CHANGE UP (ref 0–2)
EOSINOPHIL # BLD AUTO: 0.1 K/UL — SIGNIFICANT CHANGE UP (ref 0–0.5)
EOSINOPHIL NFR BLD AUTO: 0.8 % — SIGNIFICANT CHANGE UP (ref 0–6)
HCT VFR BLD CALC: 47.9 % — SIGNIFICANT CHANGE UP (ref 39–50)
HGB BLD-MCNC: 16.4 G/DL — SIGNIFICANT CHANGE UP (ref 13–17)
IMM GRANULOCYTES NFR BLD AUTO: 0.5 % — SIGNIFICANT CHANGE UP (ref 0–0.9)
LYMPHOCYTES # BLD AUTO: 1.2 K/UL — SIGNIFICANT CHANGE UP (ref 1–3.3)
LYMPHOCYTES # BLD AUTO: 9.4 % — LOW (ref 13–44)
MCHC RBC-ENTMCNC: 31.6 PG — SIGNIFICANT CHANGE UP (ref 27–34)
MCHC RBC-ENTMCNC: 34.2 G/DL — SIGNIFICANT CHANGE UP (ref 32–36)
MCV RBC AUTO: 92.3 FL — SIGNIFICANT CHANGE UP (ref 80–100)
MONOCYTES # BLD AUTO: 0.75 K/UL — SIGNIFICANT CHANGE UP (ref 0–0.9)
MONOCYTES NFR BLD AUTO: 5.9 % — SIGNIFICANT CHANGE UP (ref 2–14)
NEUTROPHILS # BLD AUTO: 10.63 K/UL — HIGH (ref 1.8–7.4)
NEUTROPHILS NFR BLD AUTO: 82.9 % — HIGH (ref 43–77)
NRBC # BLD: 0 /100 WBCS — SIGNIFICANT CHANGE UP (ref 0–0)
PLATELET # BLD AUTO: 228 K/UL — SIGNIFICANT CHANGE UP (ref 150–400)
RBC # BLD: 5.19 M/UL — SIGNIFICANT CHANGE UP (ref 4.2–5.8)
RBC # FLD: 13.6 % — SIGNIFICANT CHANGE UP (ref 10.3–14.5)
WBC # BLD: 12.81 K/UL — HIGH (ref 3.8–10.5)
WBC # FLD AUTO: 12.81 K/UL — HIGH (ref 3.8–10.5)

## 2024-11-01 PROCEDURE — 99285 EMERGENCY DEPT VISIT HI MDM: CPT

## 2024-11-01 PROCEDURE — 71045 X-RAY EXAM CHEST 1 VIEW: CPT | Mod: 26

## 2024-11-01 NOTE — ED ADULT TRIAGE NOTE - CHIEF COMPLAINT QUOTE
pt with cardiac stent placed 1 month ago c/o chest discomfort, SOB and syncope 30 minutes ago still c/o chest discomfort and SOB

## 2024-11-02 ENCOUNTER — RESULT REVIEW (OUTPATIENT)
Age: 69
End: 2024-11-02

## 2024-11-02 LAB
A1C WITH ESTIMATED AVERAGE GLUCOSE RESULT: 5.2 % — SIGNIFICANT CHANGE UP (ref 4–5.6)
ALBUMIN SERPL ELPH-MCNC: 3.9 G/DL — SIGNIFICANT CHANGE UP (ref 3.3–5)
ALP SERPL-CCNC: 74 U/L — SIGNIFICANT CHANGE UP (ref 40–120)
ALT FLD-CCNC: 28 U/L — SIGNIFICANT CHANGE UP (ref 10–45)
ANION GAP SERPL CALC-SCNC: 12 MMOL/L — SIGNIFICANT CHANGE UP (ref 5–17)
ANION GAP SERPL CALC-SCNC: 9 MMOL/L — SIGNIFICANT CHANGE UP (ref 5–17)
AST SERPL-CCNC: 34 U/L — SIGNIFICANT CHANGE UP (ref 10–40)
BILIRUB SERPL-MCNC: 0.3 MG/DL — SIGNIFICANT CHANGE UP (ref 0.2–1.2)
BUN SERPL-MCNC: 20 MG/DL — SIGNIFICANT CHANGE UP (ref 7–23)
BUN SERPL-MCNC: 21 MG/DL — SIGNIFICANT CHANGE UP (ref 7–23)
CALCIUM SERPL-MCNC: 10.2 MG/DL — SIGNIFICANT CHANGE UP (ref 8.4–10.5)
CALCIUM SERPL-MCNC: 9.4 MG/DL — SIGNIFICANT CHANGE UP (ref 8.4–10.5)
CHLORIDE SERPL-SCNC: 102 MMOL/L — SIGNIFICANT CHANGE UP (ref 96–108)
CHLORIDE SERPL-SCNC: 104 MMOL/L — SIGNIFICANT CHANGE UP (ref 96–108)
CHOLEST SERPL-MCNC: 121 MG/DL — SIGNIFICANT CHANGE UP
CO2 SERPL-SCNC: 26 MMOL/L — SIGNIFICANT CHANGE UP (ref 22–31)
CO2 SERPL-SCNC: 27 MMOL/L — SIGNIFICANT CHANGE UP (ref 22–31)
CREAT SERPL-MCNC: 1.19 MG/DL — SIGNIFICANT CHANGE UP (ref 0.5–1.3)
CREAT SERPL-MCNC: 1.31 MG/DL — HIGH (ref 0.5–1.3)
D DIMER BLD IA.RAPID-MCNC: <150 NG/ML DDU — SIGNIFICANT CHANGE UP
EGFR: 58 ML/MIN/1.73M2 — LOW
EGFR: 65 ML/MIN/1.73M2 — SIGNIFICANT CHANGE UP
ESTIMATED AVERAGE GLUCOSE: 103 MG/DL — SIGNIFICANT CHANGE UP (ref 68–114)
GLUCOSE SERPL-MCNC: 116 MG/DL — HIGH (ref 70–99)
GLUCOSE SERPL-MCNC: 97 MG/DL — SIGNIFICANT CHANGE UP (ref 70–99)
HDLC SERPL-MCNC: 27 MG/DL — LOW
LIPID PNL WITH DIRECT LDL SERPL: 68 MG/DL — SIGNIFICANT CHANGE UP
MAGNESIUM SERPL-MCNC: 1.7 MG/DL — SIGNIFICANT CHANGE UP (ref 1.6–2.6)
NON HDL CHOLESTEROL: 94 MG/DL — SIGNIFICANT CHANGE UP
POTASSIUM SERPL-MCNC: 3.3 MMOL/L — LOW (ref 3.5–5.3)
POTASSIUM SERPL-MCNC: 3.9 MMOL/L — SIGNIFICANT CHANGE UP (ref 3.5–5.3)
POTASSIUM SERPL-SCNC: 3.3 MMOL/L — LOW (ref 3.5–5.3)
POTASSIUM SERPL-SCNC: 3.9 MMOL/L — SIGNIFICANT CHANGE UP (ref 3.5–5.3)
PROT SERPL-MCNC: 6.9 G/DL — SIGNIFICANT CHANGE UP (ref 6–8.3)
SODIUM SERPL-SCNC: 139 MMOL/L — SIGNIFICANT CHANGE UP (ref 135–145)
SODIUM SERPL-SCNC: 141 MMOL/L — SIGNIFICANT CHANGE UP (ref 135–145)
TRIGL SERPL-MCNC: 143 MG/DL — SIGNIFICANT CHANGE UP
TROPONIN T, HIGH SENSITIVITY RESULT: 14 NG/L — SIGNIFICANT CHANGE UP (ref 0–51)
TROPONIN T, HIGH SENSITIVITY RESULT: 14 NG/L — SIGNIFICANT CHANGE UP (ref 0–51)
TSH SERPL-MCNC: 0.64 UIU/ML — SIGNIFICANT CHANGE UP (ref 0.27–4.2)

## 2024-11-02 PROCEDURE — 93306 TTE W/DOPPLER COMPLETE: CPT | Mod: 26

## 2024-11-02 PROCEDURE — 76376 3D RENDER W/INTRP POSTPROCES: CPT | Mod: 26

## 2024-11-02 PROCEDURE — 99223 1ST HOSP IP/OBS HIGH 75: CPT

## 2024-11-02 RX ORDER — VALACYCLOVIR HYDROCHLORIDE 500 MG/1
500 TABLET ORAL DAILY
Refills: 0 | Status: DISCONTINUED | OUTPATIENT
Start: 2024-11-02 | End: 2024-11-05

## 2024-11-02 RX ORDER — VALSARTAN 160 MG/1
320 TABLET ORAL AT BEDTIME
Refills: 0 | Status: DISCONTINUED | OUTPATIENT
Start: 2024-11-02 | End: 2024-11-05

## 2024-11-02 RX ORDER — LEVOTHYROXINE SODIUM 88 MCG
50 TABLET ORAL DAILY
Refills: 0 | Status: DISCONTINUED | OUTPATIENT
Start: 2024-11-02 | End: 2024-11-05

## 2024-11-02 RX ORDER — POTASSIUM CHLORIDE 10 MEQ
40 TABLET, EXTENDED RELEASE ORAL ONCE
Refills: 0 | Status: COMPLETED | OUTPATIENT
Start: 2024-11-02 | End: 2024-11-02

## 2024-11-02 RX ORDER — SODIUM CHLORIDE 9 MG/ML
1000 INJECTION, SOLUTION INTRAMUSCULAR; INTRAVENOUS; SUBCUTANEOUS ONCE
Refills: 0 | Status: COMPLETED | OUTPATIENT
Start: 2024-11-02 | End: 2024-11-02

## 2024-11-02 RX ORDER — MAGNESIUM SULFATE IN 0.9% NACL 2 G/50 ML
2 INTRAVENOUS SOLUTION, PIGGYBACK (ML) INTRAVENOUS ONCE
Refills: 0 | Status: COMPLETED | OUTPATIENT
Start: 2024-11-02 | End: 2024-11-02

## 2024-11-02 RX ORDER — SPIRONOLACTONE 100 MG
25 TABLET ORAL DAILY
Refills: 0 | Status: DISCONTINUED | OUTPATIENT
Start: 2024-11-02 | End: 2024-11-05

## 2024-11-02 RX ORDER — RIVAROXABAN 20 MG/1
20 TABLET, FILM COATED ORAL
Refills: 0 | Status: DISCONTINUED | OUTPATIENT
Start: 2024-11-02 | End: 2024-11-05

## 2024-11-02 RX ORDER — CLOPIDOGREL 75 MG/1
75 TABLET ORAL AT BEDTIME
Refills: 0 | Status: DISCONTINUED | OUTPATIENT
Start: 2024-11-02 | End: 2024-11-05

## 2024-11-02 RX ADMIN — RIVAROXABAN 20 MILLIGRAM(S): 20 TABLET, FILM COATED ORAL at 18:13

## 2024-11-02 RX ADMIN — Medication 50 MICROGRAM(S): at 06:11

## 2024-11-02 RX ADMIN — VALACYCLOVIR HYDROCHLORIDE 500 MILLIGRAM(S): 500 TABLET ORAL at 18:13

## 2024-11-02 RX ADMIN — SODIUM CHLORIDE 1000 MILLILITER(S): 9 INJECTION, SOLUTION INTRAMUSCULAR; INTRAVENOUS; SUBCUTANEOUS at 06:24

## 2024-11-02 RX ADMIN — SODIUM CHLORIDE 1000 MILLILITER(S): 9 INJECTION, SOLUTION INTRAMUSCULAR; INTRAVENOUS; SUBCUTANEOUS at 03:53

## 2024-11-02 RX ADMIN — CLOPIDOGREL 75 MILLIGRAM(S): 75 TABLET ORAL at 21:23

## 2024-11-02 RX ADMIN — Medication 40 MILLIEQUIVALENT(S): at 10:56

## 2024-11-02 RX ADMIN — Medication 50 GRAM(S): at 10:56

## 2024-11-02 NOTE — CONSULT NOTE ADULT - SUBJECTIVE AND OBJECTIVE BOX
Cardiology Consult Note   [Please check [email or web address] password: "[email or web address]" for cardiology service schedule and contact information]    HPI:      PAST MEDICAL & SURGICAL HISTORY:  CAD (Coronary Atherosclerotic Disease)  myocardial infarction- , s/p PCI-RCA stent- 07      HTN (Hypertension)      Asthma  controlled- no RX @ present      GERD (Gastroesophageal Reflux Disease)      Dyslipidemia      Obstructive Sleep Apnea  s/p UPP- sleep studies - refuses to use cpap.      History of prostate cancer      GI bleed  2013 pt stopped ASA      Insomnia      Impotence sexual      Stress incontinence, male      MI (myocardial infarction)   s/p insert stent      BPH (Benign Prostatic Hyperplasia)      Male urinary stress incontinence      Sleep apnea      Cardiac abnormality  cardiac stent X1       Prostate cancer  denies chemotherapy and radiation      Hyperlipidemia      Hypertension      Gastroesophageal reflux disease      COPD (chronic obstructive pulmonary disease)      IBS (irritable bowel syndrome)      Urinary incontinence      S/P UPPP (Uvulopalatopharyngoplasty)        S/P colonoscopy  and endoscopy 2013      S/P prostatectomy  2011      S/P uvulopalatopharyngoplasty        Penile abnormality  insertion of penile prothesis 2013      Stented coronary artery        FAMILY HISTORY:  Family history of hypertension (Sibling)  father  60y/o of htn, MI, mother hx htn, 1 brother htn    History of hypertension (Sibling)      SOCIAL HISTORY:  unchanged    MEDICATIONS:  clopidogrel Tablet 75 milliGRAM(s) Oral at bedtime  hydrochlorothiazide 25 milliGRAM(s) Oral daily  rivaroxaban 20 milliGRAM(s) Oral with dinner  spironolactone 25 milliGRAM(s) Oral daily  valsartan 320 milliGRAM(s) Oral at bedtime    valACYclovir 500 milliGRAM(s) Oral daily          levothyroxine 50 MICROGram(s) Oral daily          -------------------------------------------------------------------------------------------  PHYSICAL EXAM:  T(C): 36.8 (24 @ 15:15), Max: 37.1 (24 @ 11:35)  HR: 72 (24 @ 15:15) (55 - 78)  BP: 150/75 (24 @ 15:15) (105/70 - 151/88)  RR: 14 (24 @ 15:15) (14 - 17)  SpO2: 100% (24 @ 15:15) (96% - 100%)  Wt(kg): --  I&O's Summary      GENERAL: NAD  HEAD: Atraumatic, Normocephalic.  ENT: Moist mucous membranes.  NECK: Supple, No JVD.  CHEST/LUNG: Clear to auscultation bilaterally; No rales, rhonchi, wheezing, or rubs. Unlabored respirations.  HEART: Regular rate and rhythm; No murmurs, rubs, or gallops.  ABDOMEN: Bowel sounds present; Soft, Nontender, Nondistended.   EXTREMITIES:  2+ Peripheral Pulses, brisk capillary refill. No clubbing, cyanosis, or edema.    -------------------------------------------------------------------------------------------  LABS:                          16.4   12.81 )-----------( 228      ( 2024 23:21 )             47.9         139  |  104  |  20  ----------------------------<  97  3.3[L]   |  26  |  1.19    Ca    9.4      2024 06:16  Mg     1.7         TPro  6.9  /  Alb  3.9  /  TBili  0.3  /  DBili  x   /  AST  34  /  ALT  28  /  AlkPhos  74  11      CARDIAC MARKERS ( 2024 02:27 )  14 ng/L / x     / x     / x     / x     / x      CARDIAC MARKERS ( 2024 23:21 )  14 ng/L / x     / x     / x     / x     / x          valACYclovir 500 milliGRAM(s) Oral daily      \ Cardiology Consult Note   [Please check [email or web address] password: "[email or web address]" for cardiology service schedule and contact information]    HPI: 68yo m w pmh obesity, otilio, asthma/copd, htn, hld, CAD (s/p PCI to LAD ) afib s/p ablation, gerd, gib, prostate ca s/p prostatectomy c/b incontinence s/p penile prosthesis + urinary sphincter, hypothyroidism, ibs-c, migraines, s/p LAD stent in august on plavix and xarlato, presents for a syncopal episode prior to arrival. The patient notes he has had several of these episodes before and they always occur when he is sitting at the table and eating,. He notes on this occasion, he was eating dinner and began SOB, dizzy and then lost consciousness. He notes he felt a sudden chest pain right before he passed out. The last time he had this episode he was hospitalized in a hospital in [place]. He has never had a clear answer to why this occurs. After LOC he notes slightly be dizzy and then returns to his baseline. He does not have tongue biting, vision impairment or incontinence on awakening. The patient at this time has no current shortness of breath, chest pain, n/v/d, or headache..  Denies recent fever, cough, abdominal pain, nausea, vomiting, dizziness, headache.    PAST MEDICAL & SURGICAL HISTORY:  CAD (Coronary Atherosclerotic Disease)  myocardial infarction- , s/p PCI-RCA stent- 07      HTN (Hypertension)      Asthma  controlled- no RX @ present      GERD (Gastroesophageal Reflux Disease)      Dyslipidemia      Obstructive Sleep Apnea  s/p UPP- sleep studies - refuses to use cpap.      History of prostate cancer      GI bleed  2013 pt stopped ASA      Insomnia      Impotence sexual      Stress incontinence, male      MI (myocardial infarction)   s/p insert stent      BPH (Benign Prostatic Hyperplasia)      Male urinary stress incontinence      Sleep apnea      Cardiac abnormality  cardiac stent X1       Prostate cancer  denies chemotherapy and radiation      Hyperlipidemia      Hypertension      Gastroesophageal reflux disease      COPD (chronic obstructive pulmonary disease)      IBS (irritable bowel syndrome)      Urinary incontinence      S/P UPPP (Uvulopalatopharyngoplasty)        S/P colonoscopy  and endoscopy 2013      S/P prostatectomy  2011      S/P uvulopalatopharyngoplasty        Penile abnormality  insertion of penile prothesis 2013      Stented coronary artery        FAMILY HISTORY:  Family history of hypertension (Sibling)  father  60y/o of htn, MI, mother hx htn, 1 brother htn    History of hypertension (Sibling)      SOCIAL HISTORY:  unchanged    MEDICATIONS:  clopidogrel Tablet 75 milliGRAM(s) Oral at bedtime  hydrochlorothiazide 25 milliGRAM(s) Oral daily  rivaroxaban 20 milliGRAM(s) Oral with dinner  spironolactone 25 milliGRAM(s) Oral daily  valsartan 320 milliGRAM(s) Oral at bedtime    valACYclovir 500 milliGRAM(s) Oral daily          levothyroxine 50 MICROGram(s) Oral daily          -------------------------------------------------------------------------------------------  PHYSICAL EXAM:  T(C): 36.8 (24 @ 15:15), Max: 37.1 (24 @ 11:35)  HR: 72 (24 @ 15:15) (55 - 78)  BP: 150/75 (24 @ 15:15) (105/70 - 151/88)  RR: 14 (24 @ 15:15) (14 - 17)  SpO2: 100% (24 @ 15:15) (96% - 100%)  Wt(kg): --  I&O's Summary      GENERAL: NAD  HEAD: Atraumatic, Normocephalic.  ENT: Moist mucous membranes.  NECK: Supple, No JVD.  CHEST/LUNG: Clear to auscultation bilaterally; No rales, rhonchi, wheezing, or rubs. Unlabored respirations.  HEART: Regular rate and rhythm; No murmurs, rubs, or gallops.  ABDOMEN: Bowel sounds present; Soft, Nontender, Nondistended.   EXTREMITIES:  2+ Peripheral Pulses, brisk capillary refill. No clubbing, cyanosis, or edema.    -------------------------------------------------------------------------------------------  LABS:                          16.4   12.81 )-----------( 228      ( 2024 23:21 )             47.9     11    139  |  104  |  20  ----------------------------<  97  3.3[L]   |  26  |  1.19    Ca    9.4      2024 06:16  Mg     1.7         TPro  6.9  /  Alb  3.9  /  TBili  0.3  /  DBili  x   /  AST  34  /  ALT  28  /  AlkPhos  74  11      CARDIAC MARKERS ( 2024 02:27 )  14 ng/L / x     / x     / x     / x     / x      CARDIAC MARKERS ( 2024 23:21 )  14 ng/L / x     / x     / x     / x     / x          valACYclovir 500 milliGRAM(s) Oral daily      \ Cardiology Consult Note [Please check [email or web address] password: "[email or web address]" for cardiology service schedule and contact information]    HPI: 70yo m w pmh obesity, otilio, asthma/copd, htn, hld, CAD (s/p PCI to LAD ) afib s/p ablation, gerd, gib, prostate ca s/p prostatectomy c/b incontinence s/p penile prosthesis + urinary sphincter, hypothyroidism, ibs-c, migraines, s/p LAD stent in august on plavix and xarlato, presents for a syncopal episode prior to arrival. The patient notes he has had several of these episodes before and they always occur when he is sitting at the table and eating,. He notes on this occasion, he was eating dinner and began SOB, dizzy and then lost consciousness. He notes he felt a sudden chest pain right before he passed out. The last time he had this episode he was hospitalized in a hospital in [place]. He has never had a clear answer to why this occurs. After LOC he notes slightly be dizzy and then returns to his baseline. He does not have tongue biting, vision impairment or incontinence on awakening. The patient at this time has no current shortness of breath, chest pain, n/v/d, or headache..  Denies recent fever, cough, abdominal pain, nausea, vomiting, dizziness, headache.    PAST MEDICAL & SURGICAL HISTORY:  CAD (Coronary Atherosclerotic Disease)  myocardial infarction- , s/p PCI-RCA stent- 07      HTN (Hypertension)      Asthma  controlled- no RX @ present      GERD (Gastroesophageal Reflux Disease)      Dyslipidemia      Obstructive Sleep Apnea  s/p UPP- sleep studies - refuses to use cpap.      History of prostate cancer      GI bleed  2013 pt stopped ASA      Insomnia      Impotence sexual      Stress incontinence, male      MI (myocardial infarction)   s/p insert stent      BPH (Benign Prostatic Hyperplasia)      Male urinary stress incontinence      Sleep apnea      Cardiac abnormality  cardiac stent X1       Prostate cancer  denies chemotherapy and radiation      Hyperlipidemia      Hypertension      Gastroesophageal reflux disease      COPD (chronic obstructive pulmonary disease)      IBS (irritable bowel syndrome)      Urinary incontinence      S/P UPPP (Uvulopalatopharyngoplasty)        S/P colonoscopy  and endoscopy 2013      S/P prostatectomy  2011      S/P uvulopalatopharyngoplasty        Penile abnormality  insertion of penile prothesis 2013      Stented coronary artery        FAMILY HISTORY:  Family history of hypertension (Sibling)  father  62y/o of htn, MI, mother hx htn, 1 brother htn    History of hypertension (Sibling)      SOCIAL HISTORY:  unchanged    MEDICATIONS:  clopidogrel Tablet 75 milliGRAM(s) Oral at bedtime  hydrochlorothiazide 25 milliGRAM(s) Oral daily  rivaroxaban 20 milliGRAM(s) Oral with dinner  spironolactone 25 milliGRAM(s) Oral daily  valsartan 320 milliGRAM(s) Oral at bedtime    valACYclovir 500 milliGRAM(s) Oral daily          levothyroxine 50 MICROGram(s) Oral daily          -------------------------------------------------------------------------------------------  PHYSICAL EXAM:  T(C): 36.8 (24 @ 15:15), Max: 37.1 (24 @ 11:35)  HR: 72 (24 @ 15:15) (55 - 78)  BP: 150/75 (24 @ 15:15) (105/70 - 151/88)  RR: 14 (24 @ 15:15) (14 - 17)  SpO2: 100% (24 @ 15:15) (96% - 100%)  Wt(kg): --  I&O's Summary      GENERAL: NAD  HEAD: Atraumatic, Normocephalic.  ENT: Moist mucous membranes.  NECK: Supple, No JVD.  CHEST/LUNG: Clear to auscultation bilaterally; No rales, rhonchi, wheezing, or rubs. Unlabored respirations.  HEART: Regular rate and rhythm; No murmurs, rubs, or gallops.  ABDOMEN: Bowel sounds present; Soft, Nontender, Nondistended.   EXTREMITIES:  2+ Peripheral Pulses, brisk capillary refill. No clubbing, cyanosis, or edema.    -------------------------------------------------------------------------------------------  LABS:                          16.4   12.81 )-----------( 228      ( 2024 23:21 )             47.9     11    139  |  104  |  20  ----------------------------<  97  3.3[L]   |  26  |  1.19    Ca    9.4      2024 06:16  Mg     1.7         TPro  6.9  /  Alb  3.9  /  TBili  0.3  /  DBili  x   /  AST  34  /  ALT  28  /  AlkPhos  74  11      CARDIAC MARKERS ( 2024 02:27 )  14 ng/L / x     / x     / x     / x     / x      CARDIAC MARKERS ( 2024 23:21 )  14 ng/L / x     / x     / x     / x     / x          valACYclovir 500 milliGRAM(s) Oral daily

## 2024-11-02 NOTE — ED CDU PROVIDER INITIAL DAY NOTE - CLINICAL SUMMARY MEDICAL DECISION MAKING FREE TEXT BOX
I was the supervising attending. I have independently seen face-to-face and examined the patient in conjunction with the FARRAH. I have reviewed the history and physical and discussed the MDM with the FARRAH. I agree with the assessment and plan as presented above and in the ED Provider Note. -Oneida Eng MD (Attending)

## 2024-11-02 NOTE — ED PROVIDER NOTE - CLINICAL SUMMARY MEDICAL DECISION MAKING FREE TEXT BOX
69-year-old male with extensive cardiac history presents for evaluation of a syncopal episode associate with lightheadedness and chest pain difficulty breathing.  Symptoms resolved at this time however patient is high risk given recent LAD stent in August.  Differential diagnosis includes not limited to ACS versus arrhythmia versus ventricular dysfunction versus electrolyte derangement versus less likely vasovagal.  Plan for labs chest x-ray.  EKG is nonischemic without evidence of arrhythmia at this time.  Patient is in sinus rhythm with first-degree block.

## 2024-11-02 NOTE — ED CDU PROVIDER DISPOSITION NOTE - CLINICAL COURSE
68 y/o Male with PMH obesity, otilio, asthma/copd, htn, hld, cad s/p pci, afib s/p ablation, gerd, gib, prostate ca s/p prostatectomy c/b incontinence s/p penile prosthesis + urinary sphincter, hypothyroidism, ibs-c, migraines, s/p LAD stent in august on Plavix and Xarelto, presents for evaluation after having a syncopal episode prior to arrival.  Patient states he was eating dinner when he suddenly became short of breath, lightheaded, diaphoretic, with chest pain and passed out. Patient states that he "felt like he ." Reports blood pressure at the time was 100/50. States that his blood pressure fluctuates and can be very high or low. Reports similar episodes in the past. Patient currently denies chest pain. Denies persistent shortness of breath.  Denies recent fever, cough, abdominal pain, vomiting.  At baseline he states that he sometimes ambulates with a cane or a walker. Of note, he states that he has been taking Valacyclovir for a herpes infection for the last 4 months. Has a trip planned for next week. [place]  Nick [phone].  ED course: EKG with sinus rhythm with first-degree AV block. Troponin 14-14. D-dimer <150. Creatine 1.31. CXR clear. Plan for tele monitoring, echo, and cardiology consult in CDU. 70 y/o Male with PMH obesity, otilio, asthma/copd, htn, hld, cad s/p pci, afib s/p ablation, gerd, gib, prostate ca s/p prostatectomy c/b incontinence s/p penile prosthesis + urinary sphincter, hypothyroidism, ibs-c, migraines, s/p LAD stent in august on Plavix and Xarelto, presents for evaluation after having a syncopal episode prior to arrival.  Patient states he was eating dinner when he suddenly became short of breath, lightheaded, diaphoretic, with chest pain and passed out. Patient states that he "felt like he ." Reports blood pressure at the time was 100/50. States that his blood pressure fluctuates and can be very high or low. Reports similar episodes in the past. Patient currently denies chest pain. Denies persistent shortness of breath.  Denies recent fever, cough, abdominal pain, vomiting.  At baseline he states that he sometimes ambulates with a cane or a walker. Of note, he states that he has been taking Valacyclovir for a herpes infection for the last 4 months. Has a trip planned for next week. [place]  Nick [phone].  ED course: EKG with sinus rhythm with first-degree AV block. Troponin 14-14. D-dimer <150. Creatine 1.31. CXR clear. Plan for tele monitoring, echo, and cardiology consult in CDU.  In the CDU, Patient currently feeling well.  No cardiac events noted on telemetry overnight.  Loop recorder interrogated without events.  TTE unremarkable.  Patient evaluated by cardiology attending who cleared patient from cardiac perspective.  Will DC home with close outpatient follow-up and strict return precautions.

## 2024-11-02 NOTE — ED PROVIDER NOTE - PHYSICAL EXAMINATION
GENERAL: well appearing in no acute distress  HEAD: normocephalic, atraumatic  HEENT: normal conjunctiva, oral mucosa moist   CARDIAC: regular rate and rhythm, no appreciable murmurs  PULM: normal breath sounds, clear to ascultation bilaterally, no rales, rhonchi, wheezing  GI: abdomen nondistended, soft, nontender, no guarding, rebound tenderness  NEURO:  AAOx3  MSK: no peripheral edema, no calf tenderness b/l  SKIN: well-perfused, extremities warm, no visible rashes  PSYCH: appropriate mood and affect

## 2024-11-02 NOTE — ED ADULT NURSE NOTE - OBJECTIVE STATEMENT
69y M BIBEMS from home p/w status post syncope episode at home. Pt is axo4, ambulates independently at baseline. Primarily [place]yanese speaking, provided telephone translation with pt's son. PMH of CAD. Mentions he was having 15 minutes of chest discomfort and syncopized while sitting at the table in his home. Currently denies any discomfort at this time in the ED. Denies headache, dizziness, vision changes, chest pain, abdominal pain, nausea, vomiting, diarrhea, fevers, chills, dysuria, hematuria, recent illness travel or fall. Patient undressed and placed into gown, call bell in hand and side rails up with bed in lowest position for safety. blanket provided. Comfort and safety provided. 26y.o.  @ 40.1wks in labor, GBS negative  Admit to L&D  IVF, labs  Continuous efm and toco  Pain management PRN  Anticipate   Dr. Foley/ Dr. Rosario aware

## 2024-11-02 NOTE — ED PROVIDER NOTE - ATTENDING CONTRIBUTION TO CARE
This is a 69-year-old gentleman who has a known history of coronary artery disease and has an implantable cardiac monitor in place at this time.  He was having 15 minutes of chest discomfort and syncopized while sitting at the table.  He was not exertional at the time.  There is no associate shortness of breath radiation diaphoresis or vomiting.  It is nonpleuritic.  There is no leg swelling.  No active chest pain at this time  History is from the patient and his son.  Patient is awake alert talking no acute distress.  Regular rate and rhythm clear lungs nontender abdomen.  Normal radial pulses.  There is no pitting edema though the legs are swollen.  The timeline of the patient's syncope is concerning particularly that it happened after chest discomfort as well as the fact that it occurred without any vasovagal or other mitigating factors.  Concerning that this may be ventricular arrhythmia.  For now we will do CBC CMP cardiac enzymes EKG cardiac monitor.  Patient will require cardiology consult as the patient is at high risk for myocardial infarction/coronary disease.  He recently underwent a catheterization of the proximal LAD with stenting.  EKG my independent interpretation of the tracing shows no STEMI and no arrhythmia.  Hopefully will be possible to get the patient's monitor read.  Plan to either admit the patient or place him in observation depending on recommendation from cardiology if the patient requires catheterization or further coronary testing. This is a 69-year-old gentleman who has a known history of coronary artery disease and has an implantable cardiac monitor in place at this time.  He was having 15 minutes of chest discomfort and syncopized while sitting at the table.  He was not exertional at the time.  There is no associate shortness of breath radiation diaphoresis or vomiting.  It is nonpleuritic.  There is no leg swelling.  No active chest pain at this time  History is from the patient and his son.  Patient is awake alert talking no acute distress.  Regular rate and rhythm clear lungs nontender abdomen.  Normal radial pulses.  There is no pitting edema though the legs are swollen.  The timeline of the patient's syncope is concerning particularly that it happened after chest discomfort as well as the fact that it occurred without any vasovagal or other mitigating factors.  Concerning that this may be ventricular arrhythmia.  For now we will do CBC CMP cardiac enzymes EKG cardiac monitor.  Patient will require cardiology consult as the patient is at high risk for myocardial infarction/coronary disease.  He recently underwent a catheterization of the proximal LAD with stenting.  EKG my independent interpretation of the tracing shows no STEMI and no arrhythmia.  Hopefully will be possible to get the patient's monitor read.  Plan to either admit the patient or place him in observation depending on recommendation from cardiology if the patient requires catheterization or further coronary testing. I endorsed this patient at the change of shift to the overnight doctor

## 2024-11-02 NOTE — ED CDU PROVIDER DISPOSITION NOTE - PATIENT PORTAL LINK FT
You can access the FollowMyHealth Patient Portal offered by [facility] by registering at the following website: [email or web address]. By joining [employer]’s FollowMyHealth portal, you will also be able to view your health information using other applications (apps) compatible with our system.

## 2024-11-02 NOTE — ED CDU PROVIDER INITIAL DAY NOTE - PROGRESS NOTE DETAILS
CDU PROGRESS NOTE CORINA EDGE: daytime team spoke with cardiology for evaluation, pt requires loop recorder interrogation. dayteam CORINA Beckford spoke with pts unaffiliated EP - confirmed pt has medronic device. I spoke w/cardiology, pt to have device interrogated tonight, to be staffed by attending tomorrow

## 2024-11-02 NOTE — ED CDU PROVIDER DISPOSITION NOTE - NSFOLLOWUPINSTRUCTIONS_ED_ALL_ED_FT
He continues with triamterene-hydrochlorothiazide 37.5-25 mg daily.  He notices after 3 days the edema is worse.  Given his recent slight decrease in GFR and kidney function he will start taking every other day.   Hydrate.     We recommend you follow up with your primary care provider within the next 2-3 days, please bring all of your results with you. You can also follow up with your cardiologist next week.     Please return to the Emergency Department with new, worsening, or concerning symptoms, such as:  -Shortness of breath or trouble breathing  -Pressure, pain, tightness in chest  -Facial drooping, arm weakness, or speech difficulty   -Head injury or loss of consciousness   -Nonstop bleeding or an open wound     *More detailed information regarding your visit and discharge can be found by reviewing this packet Stay hydrated.  Continue current home medications.  Follow-up with your PCP in 2 to 3 days.  Bring copy of your results with you to your appointment.  Follow-up with your cardiologist within 1 week.  Call to schedule appointment.  Follow-up with neurology within 1 week.  Some will contact you in the next few days to help you schedule appointment.  Return to the ER for recurrent episodes of loss of consciousness, chest pain, shortness of breath, palpitations or any other concerning symptoms.

## 2024-11-02 NOTE — CONSULT NOTE ADULT - ATTENDING COMMENTS
Mr. Hogan is a 69 year old male with PMH as above presenting after syncopal episodes. He describes having similar episodes in the past without specific cause. He is usually sitting and eating or also standing, when suddenly feels flushed, lightheaded, diaphoretic, knee weakness and passes out. He denies head trauma, since usually sits or lays down to protect himself. The patient endorse occasional MAYBERRY and wheezing, unchanged from baseline. Seeing EP and outpatient cardiologist s/p ILR implant. Recent PCI to LAD and admits being adherent with his meds including Plavix and Xarelto. He is on multiple medications including for HTN and anxiety including duloxetine and escitalopram.    ECG unchanged. TTE normal biventricular function w/o significant valvular disease. Cardiac enzymes are negative. ILR revealed no events, no arrhthymias. No orthostasis on examination. He is frustrated and becomes upset since has no answers to this. He is hemodynamically stable and comfortable on examination. No neurological deficit.    Cardiac arrythmia or ischemic is less likely given above. Syncope, suspected neurocardiogenic, postprandial hypotension? vs polypharmacy.     Plan:  Would discontinue HCTZ. Continue valsartan and spironolactone for now.   Unclear if taking Imdur, Hydralazine or chlorthalidone.   Advised rest of medication reconciliation with PCP.  Recommended compression stocking and adequate hydration.   Continue Xarelto and Plavix along with high intensity statins  Outpatient cardiology and EP follow up.    Jericho Somers MD  Attending Cardiologist     Non-invasive Cardiology/Advanced Cardiac Imaging  [facility]   Tel: [phone] .

## 2024-11-02 NOTE — ED CDU PROVIDER INITIAL DAY NOTE - OBJECTIVE STATEMENT
68 y/o Male with PMH obesity, otilio, asthma/copd, htn, hld, cad s/p pci, afib s/p ablation, gerd, gib, prostate ca s/p prostatectomy c/b incontinence s/p penile prosthesis + urinary sphincter, hypothyroidism, ibs-c, migraines, s/p LAD stent in august on Plavix and Xarelto, presents for evaluation after having a syncopal episode prior to arrival.  Patient states he was eating dinner when he suddenly became short of breath, lightheaded, diaphoretic, with chest pain and passed out. Patient states that he "felt like he ." Reports blood pressure at the time was 100/50. States that his blood pressure fluctuates and can be very high or low. Reports similar episodes in the past. Patient currently denies chest pain. Denies persistent shortness of breath.  Denies recent fever, cough, abdominal pain, vomiting.  At baseline he states that he sometimes ambulates with a cane or a walker. Of note, he states that he has been taking Valacyclovir for a herpes infection for the last 4 months. Has a trip planned for next week. [place]  Nick [id number].  ED course: EKG with sinus rhythm with first-degree AV block. Troponin 14-14. D-dimer <150. Creatine 1.31. CXR clear. Plan for tele monitoring, echo, and cardiology consult in CDU.

## 2024-11-02 NOTE — ED PROVIDER NOTE - PROGRESS NOTE DETAILS
Macrina Butler MD, PGY3:  labs nonactionable, troponins flat, discussed  with cards will see pt in AM, recommends fluids for CODY and TTE limited,. pt to go to CDU.

## 2024-11-02 NOTE — ED PROVIDER NOTE - OBJECTIVE STATEMENT
70yo m w pmh obesity, otilio, asthma/copd, htn, hld, cad s/p pci, afib s/p ablation, gerd, gib, prostate ca s/p prostatectomy c/b incontinence s/p penile prosthesis + urinary sphincter, hypothyroidism, ibs-c, migraines, s/p LAD stent in august on plavix and xarlato, presents for evaluation after having a syncopal episode prior to arrival.  Patient states he was eating dinner when he suddenly became short of breath lightheaded had chest pain and passed out.  Patient states he had a episode of this a year ago and was hospitalized in [place].  Patient denies chest pain at this current time, feels weak but otherwise denies any persistent shortness of breath.  Denies recent fever, cough, abdominal pain, nausea, vomiting, dizziness, headache.

## 2024-11-03 VITALS
HEART RATE: 66 BPM | TEMPERATURE: 98 F | RESPIRATION RATE: 16 BRPM | SYSTOLIC BLOOD PRESSURE: 147 MMHG | DIASTOLIC BLOOD PRESSURE: 84 MMHG | OXYGEN SATURATION: 95 %

## 2024-11-03 PROCEDURE — 80061 LIPID PANEL: CPT

## 2024-11-03 PROCEDURE — 85025 COMPLETE CBC W/AUTO DIFF WBC: CPT

## 2024-11-03 PROCEDURE — 84443 ASSAY THYROID STIM HORMONE: CPT

## 2024-11-03 PROCEDURE — 99222 1ST HOSP IP/OBS MODERATE 55: CPT | Mod: GC

## 2024-11-03 PROCEDURE — 83036 HEMOGLOBIN GLYCOSYLATED A1C: CPT

## 2024-11-03 PROCEDURE — 76376 3D RENDER W/INTRP POSTPROCES: CPT

## 2024-11-03 PROCEDURE — 71045 X-RAY EXAM CHEST 1 VIEW: CPT

## 2024-11-03 PROCEDURE — 85379 FIBRIN DEGRADATION QUANT: CPT

## 2024-11-03 PROCEDURE — 80048 BASIC METABOLIC PNL TOTAL CA: CPT

## 2024-11-03 PROCEDURE — 83735 ASSAY OF MAGNESIUM: CPT

## 2024-11-03 PROCEDURE — 99285 EMERGENCY DEPT VISIT HI MDM: CPT | Mod: 25

## 2024-11-03 PROCEDURE — 99238 HOSP IP/OBS DSCHRG MGMT 30/<: CPT

## 2024-11-03 PROCEDURE — G0378: CPT

## 2024-11-03 PROCEDURE — 84484 ASSAY OF TROPONIN QUANT: CPT

## 2024-11-03 PROCEDURE — 96374 THER/PROPH/DIAG INJ IV PUSH: CPT | Mod: XU

## 2024-11-03 PROCEDURE — 80053 COMPREHEN METABOLIC PANEL: CPT

## 2024-11-03 PROCEDURE — 96361 HYDRATE IV INFUSION ADD-ON: CPT

## 2024-11-03 PROCEDURE — 82962 GLUCOSE BLOOD TEST: CPT

## 2024-11-03 PROCEDURE — 93306 TTE W/DOPPLER COMPLETE: CPT

## 2024-11-03 PROCEDURE — 93005 ELECTROCARDIOGRAM TRACING: CPT

## 2024-11-03 RX ADMIN — VALACYCLOVIR HYDROCHLORIDE 500 MILLIGRAM(S): 500 TABLET ORAL at 12:00

## 2024-11-03 RX ADMIN — VALSARTAN 320 MILLIGRAM(S): 160 TABLET ORAL at 00:50

## 2024-11-03 RX ADMIN — Medication 50 MICROGRAM(S): at 05:42

## 2024-11-03 RX ADMIN — Medication 25 MILLIGRAM(S): at 05:42

## 2024-11-03 NOTE — ED ADULT NURSE REASSESSMENT NOTE - NSFALLHARMRISKINTERV_ED_ALL_ED

## 2024-11-03 NOTE — ED CDU PROVIDER SUBSEQUENT DAY NOTE - HISTORY
CDU PROGRESS NOTE CORINA EDGE: No interval change. resting comfortably no acute complaint, denies CP,SOB, palpitations. NAD. VSS. no events on tele. pending cardiology eval for device interrogation

## 2024-11-03 NOTE — ED CDU PROVIDER SUBSEQUENT DAY NOTE - PROGRESS NOTE DETAILS
Patient currently feeling well.  No cardiac events noted on telemetry overnight.  Loop recorder interrogated without events.  TTE unremarkable.  Patient evaluated by cardiology attending who cleared patient from cardiac perspective.  Will DC home with close outpatient follow-up and strict return precautions. -Shakeel Shaffer PA-C

## 2024-11-03 NOTE — ED CDU PROVIDER SUBSEQUENT DAY NOTE - PSYCHIATRIC, MLM
Patient/Caregiver provided printed discharge information. Alert and oriented to person, place, time/situation. normal mood and affect. no apparent risk to self or others.

## 2024-11-03 NOTE — ED ADULT NURSE REASSESSMENT NOTE - NS ED NURSE REASSESS COMMENT FT1
Pt received from CHANG Valverde. Pt oriented to CDU & plan of care was discussed. Pt A&O x 4. Pt in CDU for tele, echo, cardiology consult. Pt denies any chest pain, SOB, dizziness or palpitations as of now. Pt on a cardiac monitor in NSR, HR in 60's. V/S stable, pt afebrile,  IV in place, patent and free of signs of infiltration. Pt resting in bed. Safety & comfort measures maintained. Call bell in reach. Will continue to monitor.
pt ambulated to restroom independently w/out any difficulty. Steady gait noted.
repeat EKG done, handed to ED attending
Pt resting in stretcher, denies chest pain or SOB at this time. NSR, rate 77 on the monitor.
Pt received from CHANG Delacruz at 1900. Pt oriented to CDU and plan of care was discussed. Pt is observed for syncope, here for tele, cardiology recs. A&Ox4, obeys commands, ambulatory, independent. Denies lightheadedness, HA, dizziness, blurry vision at this time. Respirations spontaneous and unlabored. Denies SOB, dyspnea, cough, CP, palpitations. On CM, NSR. IV site patent, no signs of infiltration noted. Denies N/V/D/C. Pt afebrile, denies chills. Pt resting in bed. Safety & comfort measures maintained. Call bell within reach.

## 2024-11-03 NOTE — PROCEDURE NOTE - ADDITIONAL PROCEDURE DETAILS
ILR Interrogated for syncope  No events identified on ILR    Current Lifetime Parameters  Symptom 0 0 Three 10 min Episodes  Tachy 0 0 = 158 bpm, = 16 beats  Pause 0 0 > 3 sec  Mathew 0 0 = 30 bpm, = 4 beats  AT 0 0 Off  AF 0 0 Episodes = 10 min  Time in AT/AF 0% -  PVCs (% beats) <1% - On ILR Interrogated for syncope    No events identified on ILR    Current Lifetime Parameters  Symptom 0 0 Three 10 min Episodes  Tachy 0 0 = 158 bpm, = 16 beats  Pause 0 0 > 3 sec  Mathew 0 0 = 30 bpm, = 4 beats  AT 0 0 Off  AF 0 0 Episodes = 10 min  Time in AT/AF 0% -  PVCs (% beats) <1% - On

## 2025-02-10 ENCOUNTER — RX RENEWAL (OUTPATIENT)
Age: 70
End: 2025-02-10

## 2025-04-07 NOTE — ED PROVIDER NOTE - NSTIMEPROVIDERCAREINITIATE_GEN_ER
01-Nov-2024 21:43 Render Risk Assessment In Note?: no Detail Level: Simple Comment: Discussed etiology of condition \\nDiscussed triggers or Rosacea \\nDiscussed green base foundation to fade redness of face

## 2025-06-27 NOTE — ED PROVIDER NOTE - OBJECTIVE STATEMENT
Neck , no lymphadenopathy
68M hx COPD/asthma, CAD/stent, HTN, HLD, prior A-fib S/P ablation, prostate CA S/P prostatectomy, GERD, presenting with CP/SOB. Patient endorses symptoms over last few days, intermittent, burning sensation, radiating to back and L arm. Endorses similar symptoms in the past that self-resolved, so did not present to the hospital. Otherwise denies fever/chills, cough, abdominal pain, nausea/vomiting, urinary symptoms, lightheadedness/syncope. Reports he last saw his cardiologist a few months ago. [place] OPI #[id number]

## 2025-07-03 ENCOUNTER — EMERGENCY (EMERGENCY)
Facility: HOSPITAL | Age: 70
LOS: 1 days | End: 2025-07-03
Attending: EMERGENCY MEDICINE
Payer: MEDICARE

## 2025-07-03 VITALS
HEART RATE: 70 BPM | DIASTOLIC BLOOD PRESSURE: 95 MMHG | OXYGEN SATURATION: 96 % | SYSTOLIC BLOOD PRESSURE: 152 MMHG | RESPIRATION RATE: 17 BRPM | TEMPERATURE: 98 F

## 2025-07-03 VITALS
WEIGHT: 210.1 LBS | DIASTOLIC BLOOD PRESSURE: 100 MMHG | HEIGHT: 65 IN | TEMPERATURE: 98 F | RESPIRATION RATE: 18 BRPM | SYSTOLIC BLOOD PRESSURE: 180 MMHG | HEART RATE: 72 BPM | OXYGEN SATURATION: 95 %

## 2025-07-03 DIAGNOSIS — Z95.5 PRESENCE OF CORONARY ANGIOPLASTY IMPLANT AND GRAFT: Chronic | ICD-10-CM

## 2025-07-03 LAB
ALBUMIN SERPL ELPH-MCNC: 4.1 G/DL — SIGNIFICANT CHANGE UP (ref 3.3–5)
ALP SERPL-CCNC: 73 U/L — SIGNIFICANT CHANGE UP (ref 40–120)
ALT FLD-CCNC: 28 U/L — SIGNIFICANT CHANGE UP (ref 10–45)
ANION GAP SERPL CALC-SCNC: 14 MMOL/L — SIGNIFICANT CHANGE UP (ref 5–17)
AST SERPL-CCNC: 30 U/L — SIGNIFICANT CHANGE UP (ref 10–40)
BASOPHILS # BLD AUTO: 0.06 K/UL — SIGNIFICANT CHANGE UP (ref 0–0.2)
BASOPHILS NFR BLD AUTO: 0.5 % — SIGNIFICANT CHANGE UP (ref 0–2)
BILIRUB SERPL-MCNC: 0.5 MG/DL — SIGNIFICANT CHANGE UP (ref 0.2–1.2)
BUN SERPL-MCNC: 12 MG/DL — SIGNIFICANT CHANGE UP (ref 7–23)
CALCIUM SERPL-MCNC: 10.5 MG/DL — SIGNIFICANT CHANGE UP (ref 8.4–10.5)
CHLORIDE SERPL-SCNC: 104 MMOL/L — SIGNIFICANT CHANGE UP (ref 96–108)
CO2 SERPL-SCNC: 21 MMOL/L — LOW (ref 22–31)
CREAT SERPL-MCNC: 1.08 MG/DL — SIGNIFICANT CHANGE UP (ref 0.5–1.3)
EGFR: 73 ML/MIN/1.73M2 — SIGNIFICANT CHANGE UP
EGFR: 73 ML/MIN/1.73M2 — SIGNIFICANT CHANGE UP
EOSINOPHIL # BLD AUTO: 0.3 K/UL — SIGNIFICANT CHANGE UP (ref 0–0.5)
EOSINOPHIL NFR BLD AUTO: 2.6 % — SIGNIFICANT CHANGE UP (ref 0–6)
GLUCOSE SERPL-MCNC: 75 MG/DL — SIGNIFICANT CHANGE UP (ref 70–99)
HCT VFR BLD CALC: 51.8 % — HIGH (ref 39–50)
HGB BLD-MCNC: 17 G/DL — SIGNIFICANT CHANGE UP (ref 13–17)
IMM GRANULOCYTES # BLD AUTO: 0.03 K/UL — SIGNIFICANT CHANGE UP (ref 0–0.07)
IMM GRANULOCYTES NFR BLD AUTO: 0.3 % — SIGNIFICANT CHANGE UP (ref 0–0.9)
LYMPHOCYTES # BLD AUTO: 2.05 K/UL — SIGNIFICANT CHANGE UP (ref 1–3.3)
LYMPHOCYTES NFR BLD AUTO: 17.5 % — SIGNIFICANT CHANGE UP (ref 13–44)
MCHC RBC-ENTMCNC: 30.1 PG — SIGNIFICANT CHANGE UP (ref 27–34)
MCHC RBC-ENTMCNC: 32.8 G/DL — SIGNIFICANT CHANGE UP (ref 32–36)
MCV RBC AUTO: 91.8 FL — SIGNIFICANT CHANGE UP (ref 80–100)
MONOCYTES # BLD AUTO: 1 K/UL — HIGH (ref 0–0.9)
MONOCYTES NFR BLD AUTO: 8.5 % — SIGNIFICANT CHANGE UP (ref 2–14)
NEUTROPHILS # BLD AUTO: 8.27 K/UL — HIGH (ref 1.8–7.4)
NEUTROPHILS NFR BLD AUTO: 70.6 % — SIGNIFICANT CHANGE UP (ref 43–77)
NRBC # BLD AUTO: 0 K/UL — SIGNIFICANT CHANGE UP (ref 0–0)
NRBC # FLD: 0 K/UL — SIGNIFICANT CHANGE UP (ref 0–0)
NRBC BLD AUTO-RTO: 0 /100 WBCS — SIGNIFICANT CHANGE UP (ref 0–0)
PLATELET # BLD AUTO: 184 K/UL — SIGNIFICANT CHANGE UP (ref 150–400)
PMV BLD: 11.4 FL — SIGNIFICANT CHANGE UP (ref 7–13)
POTASSIUM SERPL-MCNC: 4.3 MMOL/L — SIGNIFICANT CHANGE UP (ref 3.5–5.3)
POTASSIUM SERPL-SCNC: 4.3 MMOL/L — SIGNIFICANT CHANGE UP (ref 3.5–5.3)
PROT SERPL-MCNC: 7.1 G/DL — SIGNIFICANT CHANGE UP (ref 6–8.3)
RBC # BLD: 5.64 M/UL — SIGNIFICANT CHANGE UP (ref 4.2–5.8)
RBC # FLD: 13.5 % — SIGNIFICANT CHANGE UP (ref 10.3–14.5)
SODIUM SERPL-SCNC: 139 MMOL/L — SIGNIFICANT CHANGE UP (ref 135–145)
TROPONIN T, HIGH SENSITIVITY RESULT: 10 NG/L — SIGNIFICANT CHANGE UP (ref 0–51)
TROPONIN T, HIGH SENSITIVITY RESULT: 13 NG/L — SIGNIFICANT CHANGE UP (ref 0–51)
WBC # BLD: 11.71 K/UL — HIGH (ref 3.8–10.5)
WBC # FLD AUTO: 11.71 K/UL — HIGH (ref 3.8–10.5)

## 2025-07-03 PROCEDURE — 85025 COMPLETE CBC W/AUTO DIFF WBC: CPT

## 2025-07-03 PROCEDURE — 93005 ELECTROCARDIOGRAM TRACING: CPT | Mod: 76

## 2025-07-03 PROCEDURE — 36000 PLACE NEEDLE IN VEIN: CPT

## 2025-07-03 PROCEDURE — 99284 EMERGENCY DEPT VISIT MOD MDM: CPT

## 2025-07-03 PROCEDURE — 80053 COMPREHEN METABOLIC PANEL: CPT

## 2025-07-03 PROCEDURE — 93010 ELECTROCARDIOGRAM REPORT: CPT

## 2025-07-03 PROCEDURE — 84484 ASSAY OF TROPONIN QUANT: CPT

## 2025-07-03 PROCEDURE — 71045 X-RAY EXAM CHEST 1 VIEW: CPT

## 2025-07-03 PROCEDURE — 99285 EMERGENCY DEPT VISIT HI MDM: CPT | Mod: 25

## 2025-07-03 PROCEDURE — 71045 X-RAY EXAM CHEST 1 VIEW: CPT | Mod: 26

## 2025-07-03 RX ORDER — ACETAMINOPHEN 500 MG/5ML
650 LIQUID (ML) ORAL ONCE
Refills: 0 | Status: COMPLETED | OUTPATIENT
Start: 2025-07-03 | End: 2025-07-03

## 2025-07-03 RX ADMIN — Medication 650 MILLIGRAM(S): at 15:24

## 2025-07-03 RX ADMIN — Medication 1000 MILLILITER(S): at 15:24

## 2025-07-03 NOTE — ED PROVIDER NOTE - PROGRESS NOTE DETAILS
20:08  Wilmer PGY1: Second troponin was hemolyzed, patient notified that third troponin would be drawn but refused. Says he would like to leave as he has been waiting and did not like the food provided. Patient says he will follow up with his cardiologist outpatient Dr. Feliciano Ibrahim.

## 2025-07-03 NOTE — ED PROVIDER NOTE - NSFOLLOWUPINSTRUCTIONS_ED_ALL_ED_FT
Patient left hospital AMA, was advised of risks of leaving AMA.   Patient advised to return to ED if chest pain or headache returned or worsened. Advised to follow up with their cardiologist Dr. Feliciano Ibrahim within the next week for evaluation.

## 2025-07-03 NOTE — ED PROVIDER NOTE - CARE PLAN
Assessment and plan of treatment:	labs (cardiac enzymes, CBC, CMP)  CXR  pain meds (Tylenol)  IV fluids   Principal Discharge DX:	Chest pain  Assessment and plan of treatment:	labs (cardiac enzymes, CBC, CMP)  CXR  pain meds (Tylenol)  IV fluids  Secondary Diagnosis:	Abdominal pain with vomiting   1

## 2025-07-03 NOTE — ED PROVIDER NOTE - CLINICAL SUMMARY MEDICAL DECISION MAKING FREE TEXT BOX
69 y/o M PMHx CAD (stent placement x2), HTN, HLD, GERD presenting from PCP with headache and chest pain for 3 days. Chest pain resolved yesterday, PCP concerned for abnormal EKG done in the office and high blood pressure reading. 69 y/o M PMHx CAD (stent placement x2), HTN, HLD, GERD presenting from PCP with headache and chest pain for 3 days. Chest pain resolved yesterday, PCP concerned for abnormal EKG done in the office and high blood pressure reading.    Ino: Patient is 70-year-old with coronary artery history hypertension hyperlipidemia GERD who presents from his PCPs office after having 3 days of chest pain with headache and vomiting.  Patient had an EKG in the doctor's office that showed lateral flipped T's.  Patient not having any pain now.  Patient is mildly hypertensive.  Will check chest pain labs chest x-ray will get patient so they can eat and drink.  Unfortunately patient's cardiac risk factors are exceptionally high would think that he will need at least CDU in terms of management. Demarcomike PGY1: 69 y/o M PMHx CAD (stent placement x2), HTN, HLD, GERD presenting from PCP with headache and chest pain for 3 days. Chest pain resolved yesterday, PCP concerned for abnormal EKG done in the office and high blood pressure reading.   Patient reports resolution of headache after Tylenol and IV fluids given, troponin 13. Will repeat troponin and if patient reports no symptoms, will discuss if they would like to be admitted to CDU for cardiology followup or if they would like to be discharged home with outpatient cards appointment.      Ino: Patient is 70-year-old with coronary artery history hypertension hyperlipidemia GERD who presents from his PCPs office after having 3 days of chest pain with headache and vomiting.  Patient had an EKG in the doctor's office that showed lateral flipped T's.  Patient not having any pain now.  Patient is mildly hypertensive.  Will check chest pain labs chest x-ray will get patient so they can eat and drink.  Unfortunately patient's cardiac risk factors are exceptionally high would think that he will need at least CDU in terms of management. Wilmer PGY1: 71 y/o M PMHx CAD (stent placement x2), HTN, HLD, GERD presenting from PCP with headache and chest pain for 3 days. Chest pain resolved yesterday, PCP concerned for abnormal EKG done in the office and high blood pressure reading.   Patient reports resolution of headache after Tylenol and IV fluids given, troponin 13. Will repeat troponin and if patient reports no symptoms, will discuss if they would like to be admitted to CDU for cardiology followup or if they would like to be discharged home with outpatient cards appointment.    Second troponin was hemolyzed, patient refused third troponin draw, left AMA after being advised of risks     Ino: Patient is 70-year-old with coronary artery history hypertension hyperlipidemia GERD who presents from his PCPs office after having 3 days of chest pain with headache and vomiting.  Patient had an EKG in the doctor's office that showed lateral flipped T's.  Patient not having any pain now.  Patient is mildly hypertensive.  Will check chest pain labs chest x-ray will get patient so they can eat and drink.  Unfortunately patient's cardiac risk factors are exceptionally high would think that he will need at least CDU in terms of management.

## 2025-07-03 NOTE — ED ADULT NURSE NOTE - NS ED NURSE LEVEL OF CONSCIOUSNESS SPEECH
Speaking Coherently Caprini Score 4, moderate risk, surgical team to order appropriate VTE prophylaxis

## 2025-07-03 NOTE — ED ADULT TRIAGE NOTE - CHIEF COMPLAINT QUOTE
Chest pain HTN Denies sob  Abnorm EKG from urgent care Last year MI x 2 stents On Xarelto and Plavix

## 2025-07-03 NOTE — ED PROVIDER NOTE - PATIENT PORTAL LINK FT
You can access the FollowMyHealth Patient Portal offered by [facility] by registering at the following website: [email or web address]. By joining [employer]’s FollowMyHealth portal, you will also be able to view your health information using other applications (apps) compatible with our system.

## 2025-07-03 NOTE — ED PROVIDER NOTE - CPE EDP GASTRO NORM
"COPD EDUCATION by COPD CLINICAL EDUCATOR  (Phone: [phone])  10/12/2018 at 10:18 AM by Susan Ivory    Patient seen by Respiratory Education team to complete the final block of education.  This session discussed signs and symptoms of an exacerbation (flare-up), triggers that can create flare-ups, reiteration of the \"Action Plan\" to refer to daily which will help categorize their symptoms in order to utilize the appropriate therapy, breathing techniques used to treat acute symptoms, and oxygen safety. Smoking Cessation was discussed as appropriate to this patient. Question and answer session followed. Review and practice of Purse-lip and diaphragmatic breathing when \"panic attack\" and shortness of breath occurs. She demonstrated proper use of MDI with spacer and we discussed again, the importance of keeping her respiratory equipment clean  " normal...

## 2025-07-03 NOTE — ED PROVIDER NOTE - CPE EDP EYES NORM
normal... Terbinafine Counseling: Patient counseling regarding adverse effects of terbinafine including but not limited to headache, diarrhea, rash, upset stomach, liver function test abnormalities, itching, taste/smell disturbance, nausea, abdominal pain, and flatulence.  There is a rare possibility of liver failure that can occur when taking terbinafine.  The patient understands that a baseline LFT and kidney function test may be required. The patient verbalized understanding of the proper use and possible adverse effects of terbinafine.  All of the patient's questions and concerns were addressed.

## 2025-07-03 NOTE — ED ADULT NURSE NOTE - OBJECTIVE STATEMENT
71 y/o M PMHx HTN, CAD (stent placement x2), asthma, GERD presents from PCP with headache and chest pain. He says he went to his PCP this morning for routine annual and was told to come to ED because BP was 170/110 in the office and PCP was concerned about abnormal EKG.  Pt says he felt pressure and burning in his chest for the past 3 days with nausea and vomiting but these symptoms have resolved since yesterday,  He is currently complaining of throbbing headache. Patient says he took home meds Valsartan and Hydralazine at 1 am this morning. Denies SOB, cough, vision changes, hearing changes,  numbness or tingling, fevers, chills, abdominal pain. Pt placed on CM.

## 2025-07-03 NOTE — ED PROVIDER NOTE - OBJECTIVE STATEMENT
71 y/o M PMHx HTN, CAD (stent placement x2), asthma, GERD presents from PCP with headache and chest pain. He says he went to his PCP this morning for routine annual and was told to come to ED because of high blood pressure reading and abnormal EKG.  Pt says he felt pressure and burning in his chest for the past 3 days with nausea and vomiting but these symptoms have resolved since yesterday,  He is currently complaining of throbbing headache. Denies vision changes, numbness or tingling, fevers, chills, abdominal pain. 69 y/o M PMHx HTN, CAD (stent placement x2), asthma, GERD presents from PCP with headache and chest pain. He says he went to his PCP this morning for routine annual and was told to come to ED because BP was 170/110 in the office and PCP was concerned about abnormal EKG.  Pt says he felt pressure and burning in his chest for the past 3 days with nausea and vomiting but these symptoms have resolved since yesterday,  He is currently complaining of throbbing headache. Patient says he took home meds Valsartan and Hydralazine at 1 am this morning. Denies vision changes, hearing changes,  numbness or tingling, fevers, chills, abdominal pain. 69 y/o M PMHx HTN, CAD (stent placement x2), asthma, GERD presents from PCP with headache and chest pain. He says he went to his PCP this morning for routine annual and was told to come to ED because BP was 170/110 in the office and PCP was concerned about abnormal EKG.  Pt says he felt pressure and burning in his chest for the past 3 days with nausea and vomiting but these symptoms have resolved since yesterday,  He is currently complaining of throbbing headache. Patient says he took home meds Valsartan and Hydralazine at 1 am this morning. Denies SOB, cough, vision changes, hearing changes,  numbness or tingling, fevers, chills, abdominal pain.